# Patient Record
Sex: MALE | Race: WHITE
[De-identification: names, ages, dates, MRNs, and addresses within clinical notes are randomized per-mention and may not be internally consistent; named-entity substitution may affect disease eponyms.]

---

## 2017-04-11 ENCOUNTER — HOSPITAL ENCOUNTER (OUTPATIENT)
Dept: HOSPITAL 58 - LAB | Age: 52
Discharge: HOME | End: 2017-04-11
Attending: GENERAL PRACTICE

## 2017-04-11 DIAGNOSIS — R04.2: Primary | ICD-10-CM

## 2017-04-11 DIAGNOSIS — R05: ICD-10-CM

## 2017-04-11 LAB
ADD URINE MICROSCOPIC: NO
ALBUMIN SERPL-MCNC: 3.8 G/DL (ref 3.4–5)
ALBUMIN/GLOB SERPL: 1.03 {RATIO}
ALP SERPL-CCNC: 81 U/L (ref 50–136)
ALT SERPL-CCNC: 27 U/L (ref 12–78)
ANION GAP SERPL CALC-SCNC: 12.8 MMOL/L
AST SERPL-CCNC: 25 U/L (ref 15–37)
BASOPHILS # BLD AUTO: 0 K/UL (ref 0–0.2)
BASOPHILS NFR BLD AUTO: 0.1 % (ref 0–3)
BILIRUB SERPL-MCNC: 0.53 MG/DL (ref 0–1.2)
BUN SERPL-MCNC: 19 MG/DL (ref 7–18)
BUN/CREAT SERPL: 15.2
CALCIUM SERPL-MCNC: 9.5 MG/DL (ref 8.2–10.2)
CHLORIDE SERPL-SCNC: 105 MMOL/L (ref 98–107)
CHOLEST SERPL-MCNC: 165 MG/DL (ref 0–200)
CHOLEST/HDLC SERPL: 4.2 {RATIO} (ref 4.5–6.4)
CO2 BLD-SCNC: 25 MMOL/L (ref 21–32)
CREAT SERPL-MCNC: 1.25 MG/DL (ref 0.6–1.1)
EOSINOPHIL # BLD AUTO: 0.1 K/UL (ref 0–0.7)
EOSINOPHIL NFR BLD AUTO: 1.1 % (ref 0–7)
GFR SERPLBLD BASED ON 1.73 SQ M-ARVRAT: 60 ML/MIN
GLOBULIN SER CALC-MCNC: 3.7 G/L
GLUCOSE SERPL-MCNC: 90 MG/DL (ref 70–100)
HCT VFR BLD AUTO: 42.4 % (ref 42–52)
HDLC SERPL-MCNC: 39 MG/DL (ref 35–60)
HGB BLD-MCNC: 15.5 G/DL (ref 14–18)
IMM GRANULOCYTES NFR BLD AUTO: 0.4 % (ref 0–5)
LYMPHOCYTES # SPEC AUTO: 2.2 K/UL (ref 0.6–3.4)
LYMPHOCYTES NFR BLD AUTO: 22.6 % (ref 10–50)
MCH RBC QN: 31.5 PG (ref 27–31)
MCHC RBC AUTO-ENTMCNC: 36.6 G/DL (ref 31.8–35.4)
MCV RBC: 86.2 FL (ref 80–94)
MONOCYTES # BLD AUTO: 0.7 K/UL (ref 0.4–2)
MONOCYTES NFR BLD AUTO: 7.5 % (ref 0–10)
NEUTROPHILS # BLD AUTO: 6.7 K/UL (ref 2–6.9)
NEUTROPHILS NFR BLD AUTO: 68.3 %
PH UR: 5.5 [PH] (ref 5–9)
PLATELET # BLD AUTO: 207 10^3/UL (ref 140–440)
POTASSIUM SERPL-SCNC: 3.8 MMOL/L (ref 3.5–5.1)
PROT SERPL-MCNC: 7.5 G/DL (ref 6.4–8.2)
RBC # BLD AUTO: 4.92 10^6/UL (ref 4.7–6.1)
SODIUM SERPL-SCNC: 139 MMOL/L (ref 136–145)
SP GR UR: 1.02 (ref 1–1.03)
TRIGL SERPL-MCNC: 199 MG/DL (ref 30–150)
VLDLC SERPL CALC-MCNC: 40 MG/DL (ref 2–30)
WBC # BLD AUTO: 9.86 K/UL (ref 4.2–10.2)

## 2017-04-11 PROCEDURE — 85025 COMPLETE CBC W/AUTO DIFF WBC: CPT

## 2017-04-11 PROCEDURE — 80061 LIPID PANEL: CPT

## 2017-04-11 PROCEDURE — 81001 URINALYSIS AUTO W/SCOPE: CPT

## 2017-04-11 PROCEDURE — 36415 COLL VENOUS BLD VENIPUNCTURE: CPT

## 2017-04-11 PROCEDURE — 80053 COMPREHEN METABOLIC PANEL: CPT

## 2017-04-12 ENCOUNTER — HOSPITAL ENCOUNTER (OUTPATIENT)
Dept: HOSPITAL 58 - RAD | Age: 52
Discharge: HOME | End: 2017-04-12
Attending: GENERAL PRACTICE

## 2017-04-12 DIAGNOSIS — R04.2: Primary | ICD-10-CM

## 2017-04-12 NOTE — CT
EXAM:  CT THORAX 

  

HISTORY:  Hemoptysis. 

  

TECHNIQUE:  CT thorax with intravenous contrast.  5-mm axial sections. Coronal and sagittal re-forma
tions.  75 ml Omnipaque 

COMPARISON:  None 

  

FINDINGS: 

  

Heart size is upper limit normal. Calcifications of the heart.  There is mild aortic atherosclerotic
 disease.  A few nonspecific mediastinal and hilar lymph nodes are seen. 

  

Lungs are hyperinflated.  There is at least one calcified granuloma left lower lobe.  No suspicious 
pulmonary nodules or masses.  No acute infiltrates or vascular congestion.  There is no pneumothorax
 or pleural fluid. 

  

No acute bony abnormality. 

  

  

IMPRESSION: 

  

1.  Hyperinflation which can be consistent with a component chronic obstructive pulmonary disease, c
orrelate clinically.  No acute infiltrates or suspicious pulmonary nodules. 

2.  A few mildly prominent mediastinal lymph nodes may be reactive or postinflammatory in nature.  T
hese are nonspecific. 

3.  Given patient history, consider pulmonary consultation for further management planning.

## 2017-06-19 ENCOUNTER — RESULTS ENCOUNTER (OUTPATIENT)
Dept: UROLOGY | Facility: CLINIC | Age: 52
End: 2017-06-19

## 2017-06-19 DIAGNOSIS — R97.20 ELEVATED PSA: ICD-10-CM

## 2017-06-24 LAB
PSA FREE MFR SERPL: 16.1 %
PSA FREE SERPL-MCNC: 1.22 NG/ML
PSA SERPL-MCNC: 7.6 NG/ML (ref 0–4)

## 2017-06-26 ENCOUNTER — OFFICE VISIT (OUTPATIENT)
Dept: UROLOGY | Facility: CLINIC | Age: 52
End: 2017-06-26

## 2017-06-26 VITALS
WEIGHT: 248.8 LBS | BODY MASS INDEX: 29.38 KG/M2 | SYSTOLIC BLOOD PRESSURE: 130 MMHG | HEIGHT: 77 IN | TEMPERATURE: 98.1 F | DIASTOLIC BLOOD PRESSURE: 72 MMHG

## 2017-06-26 DIAGNOSIS — N40.1 BPH (BENIGN PROSTATIC HYPERTROPHY) WITH URINARY OBSTRUCTION: ICD-10-CM

## 2017-06-26 DIAGNOSIS — N52.9 IMPOTENCE OF ORGANIC ORIGIN: ICD-10-CM

## 2017-06-26 DIAGNOSIS — R97.20 ELEVATED PSA: Primary | ICD-10-CM

## 2017-06-26 DIAGNOSIS — N13.8 BPH (BENIGN PROSTATIC HYPERTROPHY) WITH URINARY OBSTRUCTION: ICD-10-CM

## 2017-06-26 LAB
BILIRUB BLD-MCNC: NEGATIVE MG/DL
CLARITY, POC: CLEAR
COLOR UR: YELLOW
GLUCOSE UR STRIP-MCNC: NEGATIVE MG/DL
KETONES UR QL: NEGATIVE
LEUKOCYTE EST, POC: NEGATIVE
NITRITE UR-MCNC: NEGATIVE MG/ML
PH UR: 5 [PH] (ref 5–8)
PROT UR STRIP-MCNC: NEGATIVE MG/DL
RBC # UR STRIP: NEGATIVE /UL
SP GR UR: 1.02 (ref 1–1.03)
UROBILINOGEN UR QL: NORMAL

## 2017-06-26 PROCEDURE — 81003 URINALYSIS AUTO W/O SCOPE: CPT | Performed by: UROLOGY

## 2017-06-26 PROCEDURE — 99213 OFFICE O/P EST LOW 20 MIN: CPT | Performed by: UROLOGY

## 2017-06-26 RX ORDER — ATORVASTATIN CALCIUM 20 MG/1
TABLET, FILM COATED ORAL
Refills: 3 | Status: ON HOLD | COMMUNITY
Start: 2017-05-06 | End: 2021-08-13

## 2017-06-26 NOTE — PROGRESS NOTES
Subjective    Mr. Piedra is 52 y.o. male    Chief Complaint: Elevated PSA    History of Present Illness     Benign Prostatic Hypertrophy  Patient complains of lower urinary tract symptoms. He reports frequency, incomplete emptying, intermittency, nocturia three times a night, urgency and weak stream. He denies straining. Patient states symptoms are of mild severity. Onset of symptoms was 12 months ago and was gradual in onset. His AUA Symptom Score is, 11/35.He reports a history of no complicating symptoms. He denies flank pain, gross hematuria, kidney stones and recurrent UTI. Patient has tried Alpha blockers with improvement. Last PSA was pending .           Elevated PSA  Patient is here with an elevated PSA. The PSA was drawn 12 month(s). He does have a family history of prostate cancer. His AUA Symptom Score is 11 /35. Voiding symptoms include Incomplete emptying, Frequency, Intermittency, Urgency, Weakened stream and Nocturia. Denies Straining, Dysuria and Hematuria. Voiding symptoms began several years . These have been gradual in onset. negative--7/16 PSA at time 6.3  Previous PSA values are :   No results found for: PSA        The following portions of the patient's history were reviewed and updated as appropriate: allergies, current medications, past family history, past medical history, past social history, past surgical history and problem list.    Review of Systems   Constitutional: Negative for chills and fever.   Gastrointestinal: Negative for abdominal pain, anal bleeding and blood in stool.   Genitourinary: Negative for flank pain and hematuria.         Current Outpatient Prescriptions:   •  atorvastatin (LIPITOR) 20 MG tablet, TAKE 1 TABLET BY MOUTH EVERY DAY, Disp: , Rfl: 3  •  Coenzyme Q10 (CO Q 10) 100 MG capsule, Take 300 mg by mouth., Disp: , Rfl:   •  metoprolol tartrate (LOPRESSOR) 25 MG tablet, TAKE 1 TABLET BY MOUTH TWICE A DAY, Disp: , Rfl: 3  •  tamsulosin (FLOMAX) 0.4 MG capsule 24 hr  "capsule, Take 1 capsule by mouth Daily., Disp: 90 capsule, Rfl: 3    Past Medical History:   Diagnosis Date   • Benign hypertension    • Cardiac dysrhythmia    • Chest pain    • Generalized anxiety disorder    • GERD (gastroesophageal reflux disease)    • Hyperlipidemia        Past Surgical History:   Procedure Laterality Date   • APPENDECTOMY         Social History     Social History   • Marital status:      Spouse name: N/A   • Number of children: N/A   • Years of education: N/A     Social History Main Topics   • Smoking status: Never Smoker   • Smokeless tobacco: None   • Alcohol use Yes      Comment: Occasional   • Drug use: None   • Sexual activity: Not Asked     Other Topics Concern   • None     Social History Narrative       Family History   Problem Relation Age of Onset   • Prostate cancer Father    • Coronary artery disease Mother    • Coronary artery disease Brother        Objective    /72  Temp 98.1 °F (36.7 °C)  Ht 77\" (195.6 cm)  Wt 248 lb 12.8 oz (113 kg)  BMI 29.5 kg/m2    Physical Exam   Constitutional: He is oriented to person, place, and time. He appears well-developed and well-nourished.   Pulmonary/Chest: Effort normal.   Abdominal: Soft. He exhibits no distension and no mass. There is no tenderness. There is no rebound and no guarding. No hernia.   Genitourinary: Penis normal. Rectal exam shows no mass, no tenderness and anal tone normal. Enlarged: for the age of the patient. Right testis shows no mass, no swelling and no tenderness. Left testis shows no mass, no swelling and no tenderness. No hypospadias. No discharge found.   Genitourinary Comments:  The urethral meatus normal in position without evidence of stricture. Epididymis without mass or tenderness. Vas Deferens is palpably normal.Anus and perineum without mass or tenderness. The prostate is approximately 50 ml. It is Symmetric, with a Soft consistency. There are no nodules present. . The seminal vesicles are Not " palpable due to the size of the prostate.     Neurological: He is alert and oriented to person, place, and time.           Results for orders placed or performed in visit on 06/26/17   POC Urinalysis Dipstick, Automated   Result Value Ref Range    Color Yellow Yellow, Straw, Dark Yellow, Lisa    Clarity, UA Clear Clear    Glucose, UA Negative Negative, 1000 mg/dL (3+) mg/dL    Bilirubin Negative Negative    Ketones, UA Negative Negative    Specific Gravity  1.025 1.005 - 1.030    Blood, UA Negative Negative    pH, Urine 5.0 5.0 - 8.0    Protein, POC Negative Negative mg/dL    Urobilinogen, UA Normal Normal    Leukocytes Negative Negative    Nitrite, UA Negative Negative     Assessment and Plan    Robert was seen today for elevated psa.    Diagnoses and all orders for this visit:    Elevated PSA  -     POC Urinalysis Dipstick, Automated  -     PSA, Total & Free; Future    BPH (benign prostatic hypertrophy) with urinary obstruction  -     PSA, Total & Free; Future    Impotence of organic origin          Patient underwent a biopsy in July 2016 for a PSA of 6.3.  He had a negative result.  There was no atypical glands or inflammation. GELY unchanged.  Follow up in 6 months. If PSA continues to rise will proceed with 4K score.

## 2017-07-10 ENCOUNTER — OFFICE VISIT (OUTPATIENT)
Dept: CARDIOLOGY | Facility: CLINIC | Age: 52
End: 2017-07-10

## 2017-07-10 VITALS
RESPIRATION RATE: 18 BRPM | HEART RATE: 76 BPM | HEIGHT: 77 IN | DIASTOLIC BLOOD PRESSURE: 80 MMHG | SYSTOLIC BLOOD PRESSURE: 130 MMHG | WEIGHT: 244 LBS | BODY MASS INDEX: 28.81 KG/M2

## 2017-07-10 DIAGNOSIS — E78.00 HYPERCHOLESTEROLEMIA: ICD-10-CM

## 2017-07-10 DIAGNOSIS — I49.3 PVC (PREMATURE VENTRICULAR CONTRACTION): Primary | ICD-10-CM

## 2017-07-10 DIAGNOSIS — R00.2 PALPITATIONS: ICD-10-CM

## 2017-07-10 DIAGNOSIS — I10 ESSENTIAL HYPERTENSION: ICD-10-CM

## 2017-07-10 PROCEDURE — 99214 OFFICE O/P EST MOD 30 MIN: CPT | Performed by: NURSE PRACTITIONER

## 2017-07-10 PROCEDURE — 93000 ELECTROCARDIOGRAM COMPLETE: CPT | Performed by: NURSE PRACTITIONER

## 2017-07-10 NOTE — PROGRESS NOTES
Subjective:     Encounter Date:07/10/2017      Patient ID: Robert Piedra is a 52 y.o. male.    Chief Complaint:  History of Present Illness  Patient is here for routine follow up. Has a history of palpitations found to be caused by benign PVC. Initially presented with chest pain that was found to be GERD and PVC. Patient has worn holter monitor and multiple stress test in past. Patient has occasional palpitations, brought on by stress and palpitations. Patient denies chest pain, shortness of breath or swelling. Patient is followed by Dr. Gupta. Blood pressure good control. Cholesterol good control.   The following portions of the patient's history were reviewed and updated as appropriate: allergies, current medications, past family history, past medical history, past social history, past surgical history and problem list.   Prior to Admission medications    Medication Sig Start Date End Date Taking? Authorizing Provider   atorvastatin (LIPITOR) 20 MG tablet TAKE 1 TABLET BY MOUTH EVERY DAY 5/6/17  Yes Historical Provider, MD   Coenzyme Q10 (CO Q 10) 100 MG capsule Take 300 mg by mouth.   Yes Historical Provider, MD   metoprolol tartrate (LOPRESSOR) 25 MG tablet TAKE 1 TABLET BY MOUTH TWICE A DAY 5/6/17  Yes Historical Provider, MD   tamsulosin (FLOMAX) 0.4 MG capsule 24 hr capsule Take 1 capsule by mouth Daily. 10/18/16  Yes Michele Cota MD     Past Medical History:   Diagnosis Date   • Benign hypertension    • Cardiac dysrhythmia    • Generalized anxiety disorder    • GERD (gastroesophageal reflux disease)    • Hyperlipidemia      Past Surgical History:   Procedure Laterality Date   • APPENDECTOMY         Review of Systems   Constitution: Negative for chills, decreased appetite, fever, malaise/fatigue, weight gain and weight loss.   HENT: Negative for headaches and nosebleeds.    Eyes: Negative for visual disturbance.   Cardiovascular: Positive for palpitations. Negative for chest pain, dyspnea on  "exertion, leg swelling, near-syncope, orthopnea (occasional), paroxysmal nocturnal dyspnea and syncope.   Respiratory: Negative for cough, hemoptysis, shortness of breath and snoring.    Endocrine: Negative for cold intolerance and heat intolerance.   Hematologic/Lymphatic: Negative for bleeding problem. Does not bruise/bleed easily.   Skin: Negative for rash.   Musculoskeletal: Negative for back pain and falls.   Gastrointestinal: Negative for abdominal pain, constipation, diarrhea, heartburn, melena, nausea and vomiting.   Genitourinary: Negative for hematuria.   Neurological: Negative for dizziness and light-headedness.   Psychiatric/Behavioral: Negative for altered mental status.   Allergic/Immunologic: Negative for persistent infections.         ECG 12 Lead  Date/Time: 7/10/2017 8:28 AM  Performed by: GANESH TRENT  Authorized by: GANESH TRENT   Comparison: compared with previous ECG from 8/31/2015  Similar to previous ECG  Rhythm: sinus rhythm  Ectopy: PVCs               Objective:     Physical Exam   Constitutional: He is oriented to person, place, and time. He appears well-developed and well-nourished.   HENT:   Head: Normocephalic and atraumatic.   Eyes: Pupils are equal, round, and reactive to light.   Neck: Normal range of motion. Neck supple. No JVD present. Carotid bruit is not present.   Cardiovascular: Normal rate, regular rhythm, normal heart sounds and intact distal pulses.    Pulmonary/Chest: Effort normal and breath sounds normal.   Abdominal: Soft. Bowel sounds are normal.   Musculoskeletal: Normal range of motion.   Neurological: He is alert and oriented to person, place, and time. He has normal reflexes.   Skin: Skin is warm and dry.   Psychiatric: He has a normal mood and affect. His behavior is normal. Judgment and thought content normal.     Blood pressure 130/80, pulse 76, resp. rate 18, height 77\" (195.6 cm), weight 244 lb (111 kg).      Lab Review:       Assessment:          " Diagnosis Plan   1. PVC (premature ventricular contraction)     2. Essential hypertension     3. Hypercholesterolemia     4. Palpitations            Plan:       1. Benign PVCs and palpitations. Controlled on beta blocker. No significant arrhythmia identified. No problems with operating as .   2. Blood Pressure controlled  3. Followed by PCP for cholesterol  4. Avoid triggers (caffeine, stress)

## 2017-10-29 DIAGNOSIS — N13.8 BENIGN PROSTATIC HYPERPLASIA WITH URINARY OBSTRUCTION: ICD-10-CM

## 2017-10-29 DIAGNOSIS — N40.1 BENIGN PROSTATIC HYPERPLASIA WITH URINARY OBSTRUCTION: ICD-10-CM

## 2017-10-30 RX ORDER — TAMSULOSIN HYDROCHLORIDE 0.4 MG/1
CAPSULE ORAL
Qty: 90 CAPSULE | Refills: 3 | Status: SHIPPED | OUTPATIENT
Start: 2017-10-30 | End: 2017-11-02 | Stop reason: SDUPTHER

## 2017-11-02 DIAGNOSIS — N13.8 BENIGN PROSTATIC HYPERPLASIA WITH URINARY OBSTRUCTION: ICD-10-CM

## 2017-11-02 DIAGNOSIS — N40.1 BENIGN PROSTATIC HYPERPLASIA WITH URINARY OBSTRUCTION: ICD-10-CM

## 2017-11-02 RX ORDER — TAMSULOSIN HYDROCHLORIDE 0.4 MG/1
CAPSULE ORAL
Qty: 90 CAPSULE | Refills: 0 | Status: SHIPPED | OUTPATIENT
Start: 2017-11-02 | End: 2018-06-13 | Stop reason: SDUPTHER

## 2017-12-07 DIAGNOSIS — N13.8 BENIGN PROSTATIC HYPERPLASIA WITH URINARY OBSTRUCTION: ICD-10-CM

## 2017-12-07 DIAGNOSIS — R97.20 ELEVATED PSA: ICD-10-CM

## 2017-12-07 DIAGNOSIS — N40.1 BENIGN PROSTATIC HYPERPLASIA WITH URINARY OBSTRUCTION: ICD-10-CM

## 2017-12-12 LAB
PSA FREE MFR SERPL: 21.1 %
PSA FREE SERPL-MCNC: 1.88 NG/ML
PSA SERPL-MCNC: 8.9 NG/ML (ref 0–4)

## 2017-12-13 ENCOUNTER — OFFICE VISIT (OUTPATIENT)
Dept: UROLOGY | Facility: CLINIC | Age: 52
End: 2017-12-13

## 2017-12-13 VITALS
TEMPERATURE: 97.5 F | DIASTOLIC BLOOD PRESSURE: 68 MMHG | WEIGHT: 245 LBS | HEIGHT: 77 IN | BODY MASS INDEX: 28.93 KG/M2 | SYSTOLIC BLOOD PRESSURE: 136 MMHG

## 2017-12-13 DIAGNOSIS — N40.1 BPH WITH URINARY OBSTRUCTION: ICD-10-CM

## 2017-12-13 DIAGNOSIS — N13.8 BPH WITH URINARY OBSTRUCTION: ICD-10-CM

## 2017-12-13 DIAGNOSIS — R97.20 ELEVATED PSA: Primary | ICD-10-CM

## 2017-12-13 PROCEDURE — 99213 OFFICE O/P EST LOW 20 MIN: CPT | Performed by: UROLOGY

## 2017-12-13 PROCEDURE — 81003 URINALYSIS AUTO W/O SCOPE: CPT | Performed by: UROLOGY

## 2017-12-13 NOTE — PROGRESS NOTES
Subjective    Mr. Piedra is 52 y.o. male    Chief Complaint: BPH    History of Present Illness     Benign Prostatic Hypertrophy  Patient complains of lower urinary tract symptoms. He reports frequency, incomplete emptying, intermittency, nocturia three times a night, urgency and weak stream. He denies straining. Patient states symptoms are of mild severity. Onset of symptoms was 24 months ago and was gradual in onset. His AUA Symptom Score is, 11/35.He reports a history of no complicating symptoms. He denies flank pain, gross hematuria, kidney stones and recurrent UTI. Patient has tried Alpha blockers with improvement. Last PSA was pending .              Elevated PSA  Patient is here with an elevated PSA. The PSA was drawn 12 month(s). He does have a family history of prostate cancer. His AUA Symptom Score is 11 /35. Voiding symptoms include Incomplete emptying, Frequency, Intermittency, Urgency, Weakened stream and Nocturia. Denies Straining, Dysuria and Hematuria. Voiding symptoms began several years . These have been gradual in onset. negative--7/16 PSA at time 6.3  Previous PSA values are : 8.9 % free 21.1  No results found for: PSA    The following portions of the patient's history were reviewed and updated as appropriate: allergies, current medications, past family history, past medical history, past social history, past surgical history and problem list.    Review of Systems   Constitutional: Negative for chills and fever.   Gastrointestinal: Negative for abdominal pain, anal bleeding and blood in stool.   Genitourinary: Negative for flank pain and hematuria.         Current Outpatient Prescriptions:   •  atorvastatin (LIPITOR) 20 MG tablet, TAKE 1 TABLET BY MOUTH EVERY DAY, Disp: , Rfl: 3  •  Coenzyme Q10 (CO Q 10) 100 MG capsule, Take 300 mg by mouth., Disp: , Rfl:   •  metoprolol tartrate (LOPRESSOR) 25 MG tablet, TAKE 1 TABLET BY MOUTH TWICE A DAY, Disp: , Rfl: 3  •  tamsulosin (FLOMAX) 0.4 MG capsule 24 hr  "capsule, TAKE 1 CAPSULE BY MOUTH DAILY, Disp: 90 capsule, Rfl: 0    Past Medical History:   Diagnosis Date   • Benign hypertension    • Cardiac dysrhythmia    • Generalized anxiety disorder    • GERD (gastroesophageal reflux disease)    • Hyperlipidemia        Past Surgical History:   Procedure Laterality Date   • APPENDECTOMY         Social History     Social History   • Marital status:      Spouse name: N/A   • Number of children: N/A   • Years of education: N/A     Social History Main Topics   • Smoking status: Former Smoker     Years: 20.00     Types: Cigarettes     Quit date: 2007   • Smokeless tobacco: Former User     Types: Chew   • Alcohol use Yes      Comment: Occasional   • Drug use: No   • Sexual activity: Defer     Other Topics Concern   • None     Social History Narrative       Family History   Problem Relation Age of Onset   • Prostate cancer Father    • Coronary artery disease Mother    • Coronary artery disease Brother    • No Known Problems Sister    • No Known Problems Sister    • No Known Problems Sister        Objective    /68  Temp 97.5 °F (36.4 °C)  Ht 195.6 cm (77\")  Wt 111 kg (245 lb)  BMI 29.05 kg/m2    Physical Exam   Constitutional: He is oriented to person, place, and time. He appears well-developed and well-nourished.   Pulmonary/Chest: Effort normal.   Abdominal: Soft. He exhibits no distension and no mass. There is no tenderness. There is no rebound and no guarding. No hernia.   Genitourinary: Penis normal. Rectal exam shows no mass, no tenderness and anal tone normal. Enlarged: for the age of the patient. Right testis shows no mass, no swelling and no tenderness. Left testis shows no mass, no swelling and no tenderness. No hypospadias. No discharge found.   Genitourinary Comments:  The urethral meatus normal in position without evidence of stricture. Epididymis without mass or tenderness. Vas Deferens is palpably normal.Anus and perineum without mass or tenderness. " The prostate is approximately 50 ml. It is Symmetric, with a Soft consistency. There are no nodules present. . The seminal vesicles are Not palpable due to the size of the prostate.     Neurological: He is alert and oriented to person, place, and time.           Results for orders placed or performed in visit on 12/13/17   POC Urinalysis Dipstick, Automated   Result Value Ref Range    Color Yellow Yellow, Straw, Dark Yellow, Lisa    Clarity, UA Clear Clear    Glucose, UA Negative Negative, 1000 mg/dL (3+) mg/dL    Bilirubin Negative Negative    Ketones, UA Negative Negative    Specific Gravity  1.025 1.005 - 1.030    Blood, UA Negative Negative    pH, Urine 5.0 5.0 - 8.0    Protein, POC Negative Negative mg/dL    Urobilinogen, UA Normal Normal    Leukocytes Negative Negative    Nitrite, UA Negative Negative     Assessment and Plan    Diagnoses and all orders for this visit:    Elevated PSA  -     POC Urinalysis Dipstick, Automated  -     PSA, Total & Free; Future    BPH with urinary obstruction          Patient had a negative biopsy for a PSA of 6.3.  His PSA now is 8.9 the percent free is 21%.  This Is an approximately a 10% chance of having prostate cancer on biopsy.  Continue follow up with 6 months.  If PSA > 10 will proceed with fusion MRI bx.

## 2017-12-18 ENCOUNTER — HOSPITAL ENCOUNTER (OUTPATIENT)
Dept: HOSPITAL 58 - LAB | Age: 52
Discharge: HOME | End: 2017-12-18
Attending: GENERAL PRACTICE

## 2017-12-18 DIAGNOSIS — E78.5: Primary | ICD-10-CM

## 2017-12-18 DIAGNOSIS — Z79.899: ICD-10-CM

## 2017-12-18 DIAGNOSIS — I10: ICD-10-CM

## 2017-12-18 LAB
ALBUMIN SERPL-MCNC: 3.6 G/DL (ref 3.4–5)
ALBUMIN/GLOB SERPL: 1.09 {RATIO}
ALP SERPL-CCNC: 69 U/L (ref 50–136)
ALT SERPL-CCNC: 24 U/L (ref 12–78)
ANION GAP SERPL CALC-SCNC: 14.9 MMOL/L
AST SERPL-CCNC: 30 U/L (ref 15–37)
BASOPHILS # BLD AUTO: 0 K/UL (ref 0–0.2)
BASOPHILS NFR BLD AUTO: 0.2 % (ref 0–3)
BILIRUB SERPL-MCNC: 0.8 MG/DL (ref 0–1.2)
BUN SERPL-MCNC: 15 MG/DL (ref 7–18)
BUN/CREAT SERPL: 17.64
CALCIUM SERPL-MCNC: 9.3 MG/DL (ref 8.2–10.2)
CHLORIDE SERPL-SCNC: 106 MMOL/L (ref 98–107)
CHOLEST SERPL-MCNC: 172 MG/DL (ref 0–200)
CHOLEST/HDLC SERPL: 3.3 {RATIO} (ref 4.5–6.4)
CO2 BLD-SCNC: 24 MMOL/L (ref 21–32)
CREAT SERPL-MCNC: 0.85 MG/DL (ref 0.6–1.1)
EOSINOPHIL # BLD AUTO: 0.2 K/UL (ref 0–0.7)
EOSINOPHIL NFR BLD AUTO: 3.5 % (ref 0–7)
GFR SERPLBLD BASED ON 1.73 SQ M-ARVRAT: 94 ML/MIN
GLOBULIN SER CALC-MCNC: 3.3 G/L
GLUCOSE SERPL-MCNC: 88 MG/DL (ref 70–100)
HCT VFR BLD AUTO: 42.7 % (ref 42–52)
HDLC SERPL-MCNC: 52 MG/DL (ref 35–60)
HGB BLD-MCNC: 14.7 G/DL (ref 14–18)
IMM GRANULOCYTES NFR BLD AUTO: 0.3 % (ref 0–5)
LYMPHOCYTES # SPEC AUTO: 2.2 K/UL (ref 0.6–3.4)
LYMPHOCYTES NFR BLD AUTO: 33.9 % (ref 10–50)
MCH RBC QN: 31.4 PG (ref 27–31)
MCHC RBC AUTO-ENTMCNC: 34.4 G/DL (ref 31.8–35.4)
MCV RBC: 91.2 FL (ref 80–94)
MONOCYTES # BLD AUTO: 0.6 K/UL (ref 0.4–2)
MONOCYTES NFR BLD AUTO: 9.6 % (ref 0–10)
NEUTROPHILS # BLD AUTO: 3.4 K/UL (ref 2–6.9)
NEUTROPHILS NFR BLD AUTO: 52.5 %
PLATELET # BLD AUTO: 177 10^3/UL (ref 140–440)
POTASSIUM SERPL-SCNC: 3.9 MMOL/L (ref 3.5–5.1)
PROT SERPL-MCNC: 6.9 G/DL (ref 6.4–8.2)
RBC # BLD AUTO: 4.68 10^6/UL (ref 4.7–6.1)
SODIUM SERPL-SCNC: 141 MMOL/L (ref 136–145)
TRIGL SERPL-MCNC: 56 MG/DL (ref 30–150)
VLDLC SERPL CALC-MCNC: 11 MG/DL (ref 2–30)
WBC # BLD AUTO: 6.55 K/UL (ref 4.2–10.2)

## 2017-12-18 PROCEDURE — 80053 COMPREHEN METABOLIC PANEL: CPT

## 2017-12-18 PROCEDURE — 85025 COMPLETE CBC W/AUTO DIFF WBC: CPT

## 2017-12-18 PROCEDURE — 36415 COLL VENOUS BLD VENIPUNCTURE: CPT

## 2017-12-18 PROCEDURE — 80061 LIPID PANEL: CPT

## 2018-06-04 DIAGNOSIS — R97.20 ELEVATED PSA: Primary | ICD-10-CM

## 2018-06-04 LAB — PSA SERPL-MCNC: 8.46 NG/ML (ref 0–4)

## 2018-06-11 ENCOUNTER — RESULTS ENCOUNTER (OUTPATIENT)
Dept: UROLOGY | Facility: CLINIC | Age: 53
End: 2018-06-11

## 2018-06-11 DIAGNOSIS — R97.20 ELEVATED PSA: ICD-10-CM

## 2018-06-12 NOTE — PROGRESS NOTES
Subjective    Mr. Piedra is 53 y.o. male    Chief Complaint: BPH    History of Present Illness    Benign Prostatic Hypertrophy  Patient complains of lower urinary tract symptoms. He reports frequency, incomplete emptying, intermittency, nocturia three times a night, urgency and weak stream. He denies straining. Patient states symptoms are of mild severity. Onset of symptoms was  several years ago and was gradual in onset. His AUA Symptom Score is, 18/35.He reports a history of no complicating symptoms. He denies flank pain, gross hematuria, kidney stones and recurrent UTI. Patient has tried Alpha blockers with improvement. Last PSA was pending .          Elevated PSA  Patient is here with an elevated PSA. The PSA was drawn 12 month(s). He does have a family history of prostate cancer. His AUA Symptom Score is 18 /35. Voiding symptoms include Incomplete emptying, Frequency, Intermittency, Urgency, Weakened stream and Nocturia. Denies Straining, Dysuria and Hematuria. Voiding symptoms began several years . These have been gradual in onset. negative--7/16 PSA at time 6.3  Previous PSA values are : 8.9 % free 21.1  Most recent PSA on 06/04/2018 is 8.460.    The following portions of the patient's history were reviewed and updated as appropriate: allergies, current medications, past family history, past medical history, past social history, past surgical history and problem list.    Review of Systems   Constitutional: Negative for chills and fever.   Gastrointestinal: Negative for abdominal pain, anal bleeding and blood in stool.   Genitourinary: Negative for flank pain, frequency, hematuria and urgency.         Current Outpatient Prescriptions:   •  atorvastatin (LIPITOR) 20 MG tablet, TAKE 1 TABLET BY MOUTH EVERY DAY, Disp: , Rfl: 3  •  Coenzyme Q10 (CO Q 10) 100 MG capsule, Take 300 mg by mouth., Disp: , Rfl:   •  metoprolol tartrate (LOPRESSOR) 25 MG tablet, TAKE 1 TABLET BY MOUTH TWICE A DAY, Disp: , Rfl: 3  •   "tamsulosin (FLOMAX) 0.4 MG capsule 24 hr capsule, TAKE 1 CAPSULE BY MOUTH DAILY, Disp: 90 capsule, Rfl: 3    Past Medical History:   Diagnosis Date   • Benign hypertension    • Cardiac dysrhythmia    • Generalized anxiety disorder    • GERD (gastroesophageal reflux disease)    • Hyperlipidemia        Past Surgical History:   Procedure Laterality Date   • APPENDECTOMY         Social History     Social History   • Marital status:      Social History Main Topics   • Smoking status: Former Smoker     Years: 20.00     Types: Cigarettes     Quit date: 2007   • Smokeless tobacco: Former User     Types: Chew   • Alcohol use Yes      Comment: Occasional   • Drug use: No   • Sexual activity: Defer     Other Topics Concern   • Not on file       Family History   Problem Relation Age of Onset   • Prostate cancer Father    • Coronary artery disease Mother    • Coronary artery disease Brother    • No Known Problems Sister    • No Known Problems Sister    • No Known Problems Sister        Objective    /80   Temp 98.5 °F (36.9 °C)   Ht 195.6 cm (77\")   Wt 115 kg (254 lb 6.4 oz)   BMI 30.17 kg/m²     Physical Exam   Constitutional: He is oriented to person, place, and time. He appears well-developed and well-nourished.   Pulmonary/Chest: Effort normal.   Abdominal: Soft. He exhibits no distension and no mass. There is no tenderness. There is no rebound and no guarding. No hernia.   Genitourinary: Penis normal. Rectal exam shows no mass, no tenderness and anal tone normal. Enlarged: for the age of the patient. Right testis shows no mass, no swelling and no tenderness. Left testis shows no mass, no swelling and no tenderness. No hypospadias. No discharge found.   Genitourinary Comments:  The urethral meatus normal in position without evidence of stricture. Epididymis without mass or tenderness. Vas Deferens is palpably normal.Anus and perineum without mass or tenderness. The prostate is approximately 50 ml. It is " Symmetric, with a Soft consistency. There are no nodules present. . The seminal vesicles are Not palpable due to the size of the prostate.     Neurological: He is alert and oriented to person, place, and time.           Results for orders placed or performed in visit on 06/13/18   POC Urinalysis Dipstick, Multipro   Result Value Ref Range    Color Yellow Yellow, Straw, Dark Yellow, Lisa    Clarity, UA Clear Clear    Glucose, UA Negative Negative, 1000 mg/dL (3+) mg/dL    Bilirubin Negative Negative    Ketones, UA Negative Negative    Specific Gravity  1.025 1.005 - 1.030    Blood, UA Negative Negative    pH, Urine 5.5 5.0 - 8.0    Protein, POC Negative Negative mg/dL    Urobilinogen, UA Normal Normal    Nitrite, UA Negative Negative    Leukocytes Trace (A) Negative     Assessment and Plan    Robert was seen today for elevated psa.    Diagnoses and all orders for this visit:    Elevated PSA  -     POC Urinalysis Dipstick, Multipro  -     PSA, Total & Free; Future    Benign prostatic hyperplasia with urinary obstruction  -     tamsulosin (FLOMAX) 0.4 MG capsule 24 hr capsule; TAKE 1 CAPSULE BY MOUTH DAILY          Patient had a negative biopsy for a PSA of 6.3.  His PSA now is 8.4, Which is decreased from 8.9 the percent free is 21%.   Unfortunately a percent free was not drawn. Continue follow up in 6 months with PSA and % free.

## 2018-06-13 ENCOUNTER — OFFICE VISIT (OUTPATIENT)
Dept: UROLOGY | Facility: CLINIC | Age: 53
End: 2018-06-13

## 2018-06-13 VITALS
DIASTOLIC BLOOD PRESSURE: 80 MMHG | HEIGHT: 77 IN | SYSTOLIC BLOOD PRESSURE: 152 MMHG | BODY MASS INDEX: 30.04 KG/M2 | TEMPERATURE: 98.5 F | WEIGHT: 254.4 LBS

## 2018-06-13 DIAGNOSIS — N40.1 BENIGN PROSTATIC HYPERPLASIA WITH URINARY OBSTRUCTION: ICD-10-CM

## 2018-06-13 DIAGNOSIS — N13.8 BENIGN PROSTATIC HYPERPLASIA WITH URINARY OBSTRUCTION: ICD-10-CM

## 2018-06-13 DIAGNOSIS — R97.20 ELEVATED PSA: Primary | ICD-10-CM

## 2018-06-13 LAB
BILIRUB BLD-MCNC: NEGATIVE MG/DL
CLARITY, POC: CLEAR
COLOR UR: YELLOW
GLUCOSE UR STRIP-MCNC: NEGATIVE MG/DL
KETONES UR QL: NEGATIVE
LEUKOCYTE EST, POC: ABNORMAL
NITRITE UR-MCNC: NEGATIVE MG/ML
PH UR: 5.5 [PH] (ref 5–8)
PROT UR STRIP-MCNC: NEGATIVE MG/DL
RBC # UR STRIP: NEGATIVE /UL
SP GR UR: 1.02 (ref 1–1.03)
UROBILINOGEN UR QL: NORMAL

## 2018-06-13 PROCEDURE — 99213 OFFICE O/P EST LOW 20 MIN: CPT | Performed by: UROLOGY

## 2018-06-13 PROCEDURE — 81001 URINALYSIS AUTO W/SCOPE: CPT | Performed by: UROLOGY

## 2018-06-13 RX ORDER — TAMSULOSIN HYDROCHLORIDE 0.4 MG/1
CAPSULE ORAL
Qty: 90 CAPSULE | Refills: 3 | Status: SHIPPED | OUTPATIENT
Start: 2018-06-13 | End: 2019-09-11 | Stop reason: SDUPTHER

## 2018-06-13 NOTE — PATIENT INSTRUCTIONS

## 2018-10-22 ENCOUNTER — HOSPITAL ENCOUNTER (OUTPATIENT)
Dept: HOSPITAL 58 - LAB | Age: 53
Discharge: HOME | End: 2018-10-22
Attending: GENERAL PRACTICE

## 2018-10-22 DIAGNOSIS — R97.20: Primary | ICD-10-CM

## 2018-10-22 DIAGNOSIS — R73.03: ICD-10-CM

## 2018-10-22 PROCEDURE — 85025 COMPLETE CBC W/AUTO DIFF WBC: CPT

## 2018-10-22 PROCEDURE — 81001 URINALYSIS AUTO W/SCOPE: CPT

## 2018-10-22 PROCEDURE — 80061 LIPID PANEL: CPT

## 2018-10-22 PROCEDURE — 80053 COMPREHEN METABOLIC PANEL: CPT

## 2018-10-22 PROCEDURE — 83036 HEMOGLOBIN GLYCOSYLATED A1C: CPT

## 2018-10-22 PROCEDURE — 36415 COLL VENOUS BLD VENIPUNCTURE: CPT

## 2018-12-06 ENCOUNTER — TELEPHONE (OUTPATIENT)
Dept: UROLOGY | Facility: CLINIC | Age: 53
End: 2018-12-06

## 2018-12-06 NOTE — TELEPHONE ENCOUNTER
Called and left a message for patient to call back and reschedule his missed lab appointment. Will mail patient a letter for them to reschedule.

## 2018-12-08 LAB
PSA FREE MFR SERPL: 26.1 %
PSA FREE SERPL-MCNC: 1.15 NG/ML
PSA SERPL-MCNC: 4.4 NG/ML (ref 0–4)

## 2018-12-10 ENCOUNTER — RESULTS ENCOUNTER (OUTPATIENT)
Dept: UROLOGY | Facility: CLINIC | Age: 53
End: 2018-12-10

## 2018-12-10 DIAGNOSIS — R97.20 ELEVATED PSA: ICD-10-CM

## 2018-12-11 NOTE — PROGRESS NOTES
Subjective    Mr. Piedra is 53 y.o. male    Chief Complaint: BPH    History of Present Illness     Benign Prostatic Hypertrophy  Patient complains of lower urinary tract symptoms. He reports frequency, incomplete emptying, intermittency, nocturia three times a night, urgency and weak stream. He denies straining. Patient states symptoms are of mild severity. Onset of symptoms was  several years ago and was gradual in onset. His AUA Symptom Score is, 18/35.He reports a history of no complicating symptoms. He denies flank pain, gross hematuria, kidney stones and recurrent UTI. Patient has tried Alpha blockers with improvement. Last PSA was pending .          Elevated PSA  Patient is here with an elevated PSA. The PSA was drawn 1 week(s). He does have a family history of prostate cancer. His AUA Symptom Score is 18 /35. Voiding symptoms include Incomplete emptying, Frequency, Intermittency, Urgency, Weakened stream and Nocturia. Denies Straining, Dysuria and Hematuria. Voiding symptoms began several years . These have been gradual in onset. Negative    Lab Results   Component Value Date    PSA 4.4 (H) 12/07/2018    PSA 8.460 (H) 06/04/2018    PSA 8.9 (H) 12/11/2017         The following portions of the patient's history were reviewed and updated as appropriate: allergies, current medications, past family history, past medical history, past social history, past surgical history and problem list.    Review of Systems   Constitutional: Negative for chills and fever.   Gastrointestinal: Negative for abdominal pain, anal bleeding and blood in stool.   Genitourinary: Positive for difficulty urinating. Negative for decreased urine volume, discharge, dysuria, enuresis, flank pain, frequency, genital sores, hematuria, penile pain, penile swelling, scrotal swelling, testicular pain and urgency.         Current Outpatient Medications:   •  atorvastatin (LIPITOR) 20 MG tablet, TAKE 1 TABLET BY MOUTH EVERY DAY, Disp: , Rfl: 3  •   "Coenzyme Q10 (CO Q 10) 100 MG capsule, Take 300 mg by mouth., Disp: , Rfl:   •  metoprolol tartrate (LOPRESSOR) 25 MG tablet, TAKE 1 TABLET BY MOUTH TWICE A DAY, Disp: , Rfl: 3  •  tamsulosin (FLOMAX) 0.4 MG capsule 24 hr capsule, TAKE 1 CAPSULE BY MOUTH DAILY, Disp: 90 capsule, Rfl: 3    Past Medical History:   Diagnosis Date   • Benign hypertension    • Cardiac dysrhythmia    • Generalized anxiety disorder    • GERD (gastroesophageal reflux disease)    • Hyperlipidemia        Past Surgical History:   Procedure Laterality Date   • APPENDECTOMY         Social History     Socioeconomic History   • Marital status:      Spouse name: Not on file   • Number of children: Not on file   • Years of education: Not on file   • Highest education level: Not on file   Tobacco Use   • Smoking status: Former Smoker     Years: 20.00     Types: Cigarettes     Last attempt to quit:      Years since quittin.9   • Smokeless tobacco: Former User     Types: Chew   Substance and Sexual Activity   • Alcohol use: Yes     Comment: Occasional   • Drug use: No   • Sexual activity: Defer       Family History   Problem Relation Age of Onset   • Prostate cancer Father    • Coronary artery disease Mother    • Coronary artery disease Brother    • No Known Problems Sister    • No Known Problems Sister    • No Known Problems Sister        Objective    Temp 98.5 °F (36.9 °C)   Ht 195.6 cm (77\")   Wt 115 kg (253 lb 9.6 oz)   BMI 30.07 kg/m²     Physical Exam   Constitutional: He is oriented to person, place, and time. He appears well-developed and well-nourished.   Pulmonary/Chest: Effort normal.   Abdominal: Soft. He exhibits no distension and no mass. There is no tenderness. There is no rebound and no guarding. No hernia.   Genitourinary: Penis normal. Rectal exam shows no mass, no tenderness and anal tone normal. Enlarged: for the age of the patient. Right testis shows no mass, no swelling and no tenderness. Left testis shows no " mass, no swelling and no tenderness. No hypospadias. No discharge found.   Genitourinary Comments:  The urethral meatus normal in position without evidence of stricture. Epididymis without mass or tenderness. Vas Deferens is palpably normal.Anus and perineum without mass or tenderness. The prostate is approximately 50 ml. It is Symmetric, with a Soft consistency. There are no nodules present. . The seminal vesicles are Not palpable due to the size of the prostate.     Neurological: He is alert and oriented to person, place, and time.           Results for orders placed or performed in visit on 12/13/18   POC Urinalysis Dipstick, Multipro   Result Value Ref Range    Color Yellow Yellow, Straw, Dark Yellow, Lisa    Clarity, UA Clear Clear    Glucose, UA Negative Negative, 1000 mg/dL (3+) mg/dL    Bilirubin Negative Negative    Ketones, UA Negative Negative    Specific Gravity  1.025 1.005 - 1.030    Blood, UA Negative Negative    pH, Urine 6.0 5.0 - 8.0    Protein, POC Negative Negative mg/dL    Urobilinogen, UA Normal Normal    Nitrite, UA Negative Negative    Leukocytes Negative Negative   Patient's Body mass index is 30.07 kg/m². BMI is above normal parameters. Recommendations include: educational material.    Assessment and Plan    Diagnoses and all orders for this visit:    Elevated PSA  -     POC Urinalysis Dipstick, Multipro  -     PSA, Total & Free; Future    BPH with urinary obstruction    Impotence of organic origin    Patient had a negative biopsy for a PSA of 6.3 7/2016.    PSA today is 4.4 with a percent free of 26%.    He is maintained on Flomax for his voiding symptoms.  He has an AUA symptom score of 16/35 with a bother score 3.    Continue follow up every six months with repeat PSA testing.

## 2018-12-13 ENCOUNTER — OFFICE VISIT (OUTPATIENT)
Dept: UROLOGY | Facility: CLINIC | Age: 53
End: 2018-12-13

## 2018-12-13 VITALS — BODY MASS INDEX: 29.94 KG/M2 | TEMPERATURE: 98.5 F | HEIGHT: 77 IN | WEIGHT: 253.6 LBS

## 2018-12-13 DIAGNOSIS — N52.9 IMPOTENCE OF ORGANIC ORIGIN: ICD-10-CM

## 2018-12-13 DIAGNOSIS — R97.20 ELEVATED PSA: Primary | ICD-10-CM

## 2018-12-13 DIAGNOSIS — N13.8 BPH WITH URINARY OBSTRUCTION: ICD-10-CM

## 2018-12-13 DIAGNOSIS — N40.1 BPH WITH URINARY OBSTRUCTION: ICD-10-CM

## 2018-12-13 LAB
BILIRUB BLD-MCNC: NEGATIVE MG/DL
CLARITY, POC: CLEAR
COLOR UR: YELLOW
GLUCOSE UR STRIP-MCNC: NEGATIVE MG/DL
KETONES UR QL: NEGATIVE
LEUKOCYTE EST, POC: NEGATIVE
NITRITE UR-MCNC: NEGATIVE MG/ML
PH UR: 6 [PH] (ref 5–8)
PROT UR STRIP-MCNC: NEGATIVE MG/DL
RBC # UR STRIP: NEGATIVE /UL
SP GR UR: 1.02 (ref 1–1.03)
UROBILINOGEN UR QL: NORMAL

## 2018-12-13 PROCEDURE — 99213 OFFICE O/P EST LOW 20 MIN: CPT | Performed by: UROLOGY

## 2018-12-13 PROCEDURE — 81001 URINALYSIS AUTO W/SCOPE: CPT | Performed by: UROLOGY

## 2018-12-13 NOTE — PATIENT INSTRUCTIONS

## 2019-03-11 ENCOUNTER — HOSPITAL ENCOUNTER (EMERGENCY)
Dept: HOSPITAL 58 - ED | Age: 54
Discharge: HOME | End: 2019-03-11

## 2019-03-11 VITALS — BODY MASS INDEX: 30.5 KG/M2

## 2019-03-11 VITALS — SYSTOLIC BLOOD PRESSURE: 148 MMHG | TEMPERATURE: 97.6 F | DIASTOLIC BLOOD PRESSURE: 100 MMHG

## 2019-03-11 DIAGNOSIS — R05: ICD-10-CM

## 2019-03-11 DIAGNOSIS — T78.40XA: Primary | ICD-10-CM

## 2019-03-11 DIAGNOSIS — R21: ICD-10-CM

## 2019-03-11 DIAGNOSIS — R06.02: ICD-10-CM

## 2019-03-11 PROCEDURE — 99282 EMERGENCY DEPT VISIT SF MDM: CPT

## 2019-03-11 NOTE — ED.PDOC
General


ED Provider: 


Dr. LAN QUINTANILLA





Chief Complaint: Allergic Reaction


Stated Complaint: developed a rash after being treated with the antibiotics he 

has been having a cough, no c/p no shortness of breath able to do his routine 

without gettting s.o.b.


Time Seen by Physician: 08:10


Mode of Arrival: Walk-In


Information Source: Patient


Exam Limitations: No limitations


[facility] 


[facility]





Nursing and Triage Documentation Reviewed and Agree: Yes


Does patient meet sepsis criteria?: No


System Inflammatory Response Syndrome: Not Applicable


Sepsis Protocol: 


For patient's 13 years and over:





Temp is 96.8 and below  and greater


Pulse >90 BPM


Resp >20/minute


Acutely Altered Mental Status





Are patient's symptoms suggestive of a new infection, such as:


   -Pneumonia


   -Skin, Soft Tissue


   -Endocarditis


   -UTI


   -Bone, Joint Infection


   -Implantable Device


   -Acute Abdominal Infection


   -Wound Infection


   -Meningitis


   -Blood Stream Catheter Infection


   -Unknown








Respiratory Complaint Exam





- Respiratory Complaint/Exam


Symptoms Are: Still present


Timing: Intermittent


Initial Severity: Mild


Current Severity: Mild


Location: Nose, Throat, Chest


Character: Reports: Non-productive cough, Dry cough


Aggravating: Reports: None


Alleviating: Reports: Spontaneous resolution


Associated Signs and Symptoms: Denies: Rapid breathing, Dyspnea, Fever, Chills, 

Chest pain, Pleuritic chest pain, Wheezing, Hemoptysis, Dizziness, Calf pain, 

Calf swelling, Edema, URI, Nasal congestion, Hoarseness, Sinus discomfort, 

Vomiting, Sore throat, Weight loss, Decreased oral intake, Increased thirst, 

Increased appetite, Increased urination


History of Healthcare-Acquired Pneumonia: No


Related Surgical History: Reports: None


Pulmonary Embolism Risk Factors: None


Cardiac Risk Factors: Reports: None


Pseudomonas Risk Factors: Reports: None


Tuberculosis Risk Factors: Reports: None


Status Asthmaticus Risk Factors: Reports: None


Home Oxygen Use: No


Recent Stress Test: No


Recent Echo/LV Function: No


Current Antibiotic Use: No


Current Asthma Medication Use: No


Respiratory Distress: None


Inadequate Respiratory Effort: No


Dysphagia Present: No


Stridor Present: No


JVD Present: No


Accessory Muscle Use: No


Retractions: Not Present


Diminished Breath Sounds: No


Sinus Tenderness: None


Grunting Respirations: No


Kussmaul Respirations: No


Differential Diagnoses: Pneumonia, Bronchitis





Review of Systems





- Review Of Systems


Constitutional: Reports: No symptoms


Eyes: Reports: No symptoms


Ears, Nose, Mouth, Throat: Reports: No symptoms


Respiratory: Reports: Cough


Cardiac: Reports: No symptoms


GI: Reports: No symptoms


: Reports: No symptoms


Musculoskeletal: Reports: No symptoms


Skin: Reports: Rash (CHEST ABDOMEN WHICH IS PURITIC)


Neurological: Reports: No symptoms


Endocrine: Reports: No symptoms


Hematologic/Lymphatic: Reports: No symptoms


All Other Systems: Reviewed and Negative





Past Medical History





- Past Medical History


Previously Healthy: Yes


Endocrine: Reports: None


Cardiovascular: Reports: None


Respiratory: Reports: None


Hematological: Reports: None


Gastrointestinal: Reports: None


Genitourinary: Reports: None


Neuro/Psych: Reports: None


Musculoskeletal: Reports: None


Cancer: Reports: None





- Surgical History


General Surgical History: Reports: None





- Family History


Family History: Reports: None





- Social History


Smoking Status: Former smoker


Hx Substance Use: No


Alcohol Screening: None





Physical Exam





- Physical Exam


Appearance: Well-appearing, No pain distress, Well-nourished


Eyes: MARS, EOMI, Conjunctiva clear


ENT: Ears normal, Nose normal, Oropharynx normal


Neck: Supple (NO AIR WAY ISSUE NOTED)


Respiratory: Airway patent, Breath sounds clear, Breath sounds equal, 

Respirations nonlabored


Cardiovascular: RRR, Pulses normal, No rub, No murmur


GI/: Soft, Nontender, No masses, Bowel sounds normal, No Organomegaly


Musculoskeletal: Normal strength, ROM intact, No edema, No calf tenderness


Skin: Warm, Dry (A MACULAR RASH NOTED ON THE CHEST ABDOMEN), Normal color


Neurological: Sensation intact, Motor intact, Reflexes intact, Cranial nerves 

intact, Alert, Oriented


Psychiatric: Affect appropriate, Mood appropriate





Critical Care Note





- Critical Care Note


Total Time (mins): 0





Course





- Course


Orders, Labs, Meds: 





Orders











 Category Date Time Status


 


 CT CHEST W/O CONTRAST Stat RADS  03/11/19 09:01 Taken











Vital Signs: 





 











  Temp Pulse Resp BP Pulse Ox


 


 03/11/19 08:03  97.6 F  74  20  148/100 H  95














Departure





- Departure


Time of Disposition: 10:30


Disposition: HOME SELF-CARE


Discharge Problem: 


 Cough, Rash





Allergic reaction


Qualifiers:


 Encounter type: initial encounter Qualified Code(s): T78.40XA - Allergy, 

unspecified, initial encounter





Instructions:  Antibiotic Medication Allergy (ED), General Allergic Reaction (ED

)


Condition: Good


Pt referred to PMD for follow-up: Yes


IPMP verified?: No


Additional Instructions: 


Please call your Family Physician as soon as possible to schedule a follow-up 

appointment.STOP  ZITHROMAX(Z.PACK) . SEE YOUR  M.D. FOR  THE FOLLOW  UP.   IF 

YOU HAVE ANY ISSUES LIKE,  SHORTNESS OF BREATH OR CHEST PAIN OR BOTH RETURN TO 

E.R..


Allergies/Adverse Reactions: 


Allergies





Penicillins Allergy (Severe, Verified 03/11/19 08:09)


 Rash


 From age 16 








Home Medications: 


Ambulatory Orders





Ubidecarenone [Co Q-10] 300 mg PO DAILY 10/13/15 


Tamsulosin HCl [Flomax] 0.4 mg PO DAILY  tab-cap 12/09/16 


Albuterol Sulfate [Proair Hfa] 2 puff IH Q4H PRN 03/11/19 


Azithromycin 250 mg PO DAILY 03/11/19 


Loratadine [Claritin] 10 mg PO DAILY 03/11/19 


Methylprednisolone [Medrol Dosepak] 4 mg PO AS DIRECTED 03/11/19

## 2019-03-11 NOTE — CT
EXAM:  CT of the chest without contrast 

  

History:  Cough. 

  

Comparison:  Chest CT 04/12/2017 

  

Technique:  Multiplanar CT images through the thorax were obtained without the administration of IV c
ontrast 

  

Findings:  Heart is enlarged.  Coronary calcifications and mitral valve calcifications.  No pericardi
al effusion.  No thoracic aortic aneurysm.  Small scattered mediastinal lymph nodes.  Evaluation for 
hilar lymph nodes is limited due to lack of IV contrast.  There is interlobular septal thickening whi
ch is more prominent in the right lung.  Patchy ground-glass infiltrates are seen within the right ole
ng.  No suspicious lung masses or lung nodules.  Calcified granulomas are seen within the lungs.  No 
pleural fluid and no pneumothorax. 

  

Within the visualized upper abdomen, no acute findings.  No acute osseous abnormalities. 

  

Impression: 

1.  Interlobular septal thickening which is more prominent in the right lung with patchy ground-glass
 right lung infiltrates could be due to asymmetric pulmonary edema or interstitial pneumonitis. 

2.  Cardiomegaly. 

3.  Coronary artery disease and mitral valve calcifications

## 2019-06-11 ENCOUNTER — RESULTS ENCOUNTER (OUTPATIENT)
Dept: UROLOGY | Facility: CLINIC | Age: 54
End: 2019-06-11

## 2019-06-11 DIAGNOSIS — R97.20 ELEVATED PSA: ICD-10-CM

## 2019-06-14 LAB
PSA FREE MFR SERPL: 22.1 %
PSA FREE SERPL-MCNC: 1.26 NG/ML
PSA SERPL-MCNC: 5.7 NG/ML (ref 0–4)

## 2019-09-06 ENCOUNTER — OFFICE VISIT (OUTPATIENT)
Dept: UROLOGY | Facility: CLINIC | Age: 54
End: 2019-09-06

## 2019-09-06 VITALS — BODY MASS INDEX: 29.87 KG/M2 | HEART RATE: 83 BPM | OXYGEN SATURATION: 97 % | WEIGHT: 253 LBS | HEIGHT: 77 IN

## 2019-09-06 DIAGNOSIS — R97.20 ELEVATED PSA: Primary | ICD-10-CM

## 2019-09-06 DIAGNOSIS — N52.9 IMPOTENCE OF ORGANIC ORIGIN: ICD-10-CM

## 2019-09-06 DIAGNOSIS — N13.8 BPH WITH URINARY OBSTRUCTION: ICD-10-CM

## 2019-09-06 DIAGNOSIS — N40.1 BPH WITH URINARY OBSTRUCTION: ICD-10-CM

## 2019-09-06 LAB
BILIRUB BLD-MCNC: NEGATIVE MG/DL
CLARITY, POC: CLEAR
COLOR UR: YELLOW
GLUCOSE UR STRIP-MCNC: NEGATIVE MG/DL
KETONES UR QL: ABNORMAL
LEUKOCYTE EST, POC: NEGATIVE
NITRITE UR-MCNC: NEGATIVE MG/ML
PH UR: 5 [PH] (ref 5–8)
PROT UR STRIP-MCNC: NEGATIVE MG/DL
RBC # UR STRIP: NEGATIVE /UL
SP GR UR: 1.02 (ref 1–1.03)
UROBILINOGEN UR QL: NORMAL

## 2019-09-06 PROCEDURE — 99213 OFFICE O/P EST LOW 20 MIN: CPT | Performed by: UROLOGY

## 2019-09-06 PROCEDURE — 81003 URINALYSIS AUTO W/O SCOPE: CPT | Performed by: UROLOGY

## 2019-09-06 RX ORDER — APIXABAN 5 MG/1
5 TABLET, FILM COATED ORAL 2 TIMES DAILY
Refills: 4 | COMMUNITY
Start: 2019-08-20 | End: 2020-09-10

## 2019-09-06 NOTE — PATIENT INSTRUCTIONS

## 2019-09-11 DIAGNOSIS — N40.1 BENIGN PROSTATIC HYPERPLASIA WITH URINARY OBSTRUCTION: ICD-10-CM

## 2019-09-11 DIAGNOSIS — N13.8 BENIGN PROSTATIC HYPERPLASIA WITH URINARY OBSTRUCTION: ICD-10-CM

## 2019-09-11 RX ORDER — TAMSULOSIN HYDROCHLORIDE 0.4 MG/1
CAPSULE ORAL
Qty: 90 CAPSULE | Refills: 3 | Status: SHIPPED | OUTPATIENT
Start: 2019-09-11 | End: 2020-08-31

## 2020-08-29 DIAGNOSIS — N13.8 BENIGN PROSTATIC HYPERPLASIA WITH URINARY OBSTRUCTION: ICD-10-CM

## 2020-08-29 DIAGNOSIS — N40.1 BENIGN PROSTATIC HYPERPLASIA WITH URINARY OBSTRUCTION: ICD-10-CM

## 2020-08-31 RX ORDER — TAMSULOSIN HYDROCHLORIDE 0.4 MG/1
CAPSULE ORAL
Qty: 90 CAPSULE | Refills: 3 | Status: ON HOLD | OUTPATIENT
Start: 2020-08-31 | End: 2021-08-13

## 2020-09-04 ENCOUNTER — LAB (OUTPATIENT)
Dept: LAB | Facility: HOSPITAL | Age: 55
End: 2020-09-04

## 2020-09-04 DIAGNOSIS — R97.20 ELEVATED PSA: Primary | ICD-10-CM

## 2020-09-04 PROCEDURE — 84153 ASSAY OF PSA TOTAL: CPT | Performed by: UROLOGY

## 2020-09-04 PROCEDURE — 36415 COLL VENOUS BLD VENIPUNCTURE: CPT

## 2020-09-04 PROCEDURE — 84154 ASSAY OF PSA FREE: CPT | Performed by: UROLOGY

## 2020-09-05 LAB
PSA FREE MFR SERPL: 14.8 %
PSA FREE SERPL-MCNC: 1.21 NG/ML
PSA SERPL-MCNC: 8.2 NG/ML (ref 0–4)

## 2020-09-08 NOTE — PROGRESS NOTES
Subjective    Mr. Piedra is 55 y.o. male    Chief Complaint: Elevated PSA/BPH.    History of Present Illness  Benign Prostatic Hypertrophy  Patient complains of lower urinary tract symptoms. He reports frequency, incomplete emptying, intermittency, nocturia three times a night, urgency and weak stream. He denies straining. Patient states symptoms are of mild severity. Onset of symptoms was  several years ago and was gradual in onset. His AUA Symptom Score is, 12/35.He reports a history of no complicating symptoms. He denies flank pain, gross hematuria, kidney stones and recurrent UTI. Patient has tried Alpha blockers with improvement. Last PSA was pending .        Lab Results   Component Value Date    PSA 8.2 (H) 09/04/2020    PSA 5.7 (H) 06/13/2019    PSA 4.4 (H) 12/07/2018         The following portions of the patient's history were reviewed and updated as appropriate: allergies, current medications, past family history, past medical history, past social history, past surgical history and problem list.    Review of Systems   Constitutional: Negative for chills and fever.   Gastrointestinal: Negative for abdominal pain, anal bleeding and blood in stool.   Genitourinary: Positive for frequency and urgency. Negative for dysuria and hematuria.         Current Outpatient Medications:   •  atorvastatin (LIPITOR) 20 MG tablet, TAKE 1 TABLET BY MOUTH EVERY DAY, Disp: , Rfl: 3  •  Coenzyme Q10 (CO Q 10) 100 MG capsule, Take 300 mg by mouth., Disp: , Rfl:   •  furosemide (LASIX) 40 MG tablet, , Disp: , Rfl:   •  metoprolol tartrate (LOPRESSOR) 25 MG tablet, TAKE 1 TABLET BY MOUTH TWICE A DAY, Disp: , Rfl: 3  •  mupirocin (BACTROBAN) 2 % ointment, APPLY PEA SIZED AMOUNT TO EACH NOSTRIL TWICE DAILY STARTING ON 8/17 FOLLOWING C/COVID 19 TEST., Disp: , Rfl:   •  potassium chloride (K-DUR,KLOR-CON) 20 MEQ CR tablet, Take 20 mEq by mouth Daily., Disp: , Rfl:   •  SM ASPIRIN ADULT LOW STRENGTH 81 MG EC tablet, , Disp: , Rfl:   •   "tamsulosin (FLOMAX) 0.4 MG capsule 24 hr capsule, TAKE 1 CAPSULE BY MOUTH EVERY DAY, Disp: 90 capsule, Rfl: 3    Past Medical History:   Diagnosis Date   • Benign hypertension    • Cardiac dysrhythmia    • Generalized anxiety disorder    • GERD (gastroesophageal reflux disease)    • Hyperlipidemia        Past Surgical History:   Procedure Laterality Date   • APPENDECTOMY     • CARDIAC ABLATION     • CARDIOVERSION  2019    [place]       Social History     Socioeconomic History   • Marital status:      Spouse name: Not on file   • Number of children: Not on file   • Years of education: Not on file   • Highest education level: Not on file   Tobacco Use   • Smoking status: Former Smoker     Years: 20.00     Types: Cigarettes     Last attempt to quit:      Years since quittin.7   • Smokeless tobacco: Former User     Types: Chew   Substance and Sexual Activity   • Alcohol use: Yes     Comment: Occasional   • Drug use: No   • Sexual activity: Defer       Family History   Problem Relation Age of Onset   • Prostate cancer Father    • Coronary artery disease Mother    • Coronary artery disease Brother    • No Known Problems Sister    • No Known Problems Sister    • No Known Problems Sister        Objective    Temp 98.5 °F (36.9 °C) (Temporal)   Ht 195.6 cm (77\")   Wt 109 kg (240 lb 9.6 oz)   BMI 28.53 kg/m²     Physical Exam   Constitutional: He is oriented to person, place, and time. He appears well-developed and well-nourished.   Pulmonary/Chest: Effort normal.   Abdominal: Soft. He exhibits no distension and no mass. There is no tenderness. There is no rebound and no guarding. No hernia.   Genitourinary: Rectal exam shows no mass, no tenderness and anal tone normal. Enlarged: for the age of the patient. Right testis shows no mass, no swelling and no tenderness. Left testis shows no mass, no swelling and no tenderness. No hypospadias. No discharge found.   Genitourinary Comments:  The urethral " meatus normal in position without evidence of stricture. Epididymis without mass or tenderness. Vas Deferens is palpably normal.Anus and perineum without mass or tenderness. The prostate is approximately 60 ml. It is Symmetric, with a Soft consistency. There are no nodules present. . The seminal vesicles are Not palpable due to the size of the prostate.     Neurological: He is alert and oriented to person, place, and time.   Vitals reviewed.          Results for orders placed or performed in visit on 09/10/20   POC Urinalysis Dipstick, Multipro   Result Value Ref Range    Color Yellow Yellow, Straw, Dark Yellow, Lisa    Clarity, UA Clear Clear    Glucose, UA Negative Negative, 1000 mg/dL (3+) mg/dL    Bilirubin Negative Negative    Ketones, UA Negative Negative    Specific Gravity  1.020 1.005 - 1.030    Blood, UA Negative Negative    pH, Urine 5.0 5.0 - 8.0    Protein, POC Negative Negative mg/dL    Urobilinogen, UA Normal Normal    Nitrite, UA Negative Negative    Leukocytes Negative Negative     Assessment and Plan    Diagnoses and all orders for this visit:    Elevated PSA  -     POC Urinalysis Dipstick, Multipro    BPH with urinary obstruction    Impotence of organic origin    Patient had a negative biopsy for a PSA of 6.3 7/2016.     PSA today is 8.2.  This is an increase from his previous level.  He recently was in hospital and had a catheter in which could account for PSA elevation.  If his PSA continues to elevate we will proceed with MRI.     He is maintained on Flomax for his voiding symptoms.  He has an AUA symptom score of 12/35.  He is happy with how he is voiding.     Follow up in 6 months with PSA.

## 2020-09-10 ENCOUNTER — OFFICE VISIT (OUTPATIENT)
Dept: UROLOGY | Facility: CLINIC | Age: 55
End: 2020-09-10

## 2020-09-10 VITALS — TEMPERATURE: 98.5 F | BODY MASS INDEX: 28.41 KG/M2 | HEIGHT: 77 IN | WEIGHT: 240.6 LBS

## 2020-09-10 DIAGNOSIS — N40.1 BPH WITH URINARY OBSTRUCTION: ICD-10-CM

## 2020-09-10 DIAGNOSIS — R97.20 ELEVATED PSA: Primary | ICD-10-CM

## 2020-09-10 DIAGNOSIS — N52.9 IMPOTENCE OF ORGANIC ORIGIN: ICD-10-CM

## 2020-09-10 DIAGNOSIS — N13.8 BPH WITH URINARY OBSTRUCTION: ICD-10-CM

## 2020-09-10 PROCEDURE — 81001 URINALYSIS AUTO W/SCOPE: CPT | Performed by: UROLOGY

## 2020-09-10 PROCEDURE — 99214 OFFICE O/P EST MOD 30 MIN: CPT | Performed by: UROLOGY

## 2020-09-10 RX ORDER — POTASSIUM CHLORIDE 20 MEQ/1
20 TABLET, EXTENDED RELEASE ORAL DAILY
COMMUNITY
Start: 2020-08-24 | End: 2020-09-24

## 2020-09-10 RX ORDER — FUROSEMIDE 40 MG/1
20 TABLET ORAL DAILY
COMMUNITY
Start: 2020-08-24 | End: 2021-08-20 | Stop reason: HOSPADM

## 2020-09-10 RX ORDER — ASPIRIN 81 MG/1
TABLET, DELAYED RELEASE ORAL
COMMUNITY
Start: 2020-08-24 | End: 2021-03-24

## 2020-10-11 ENCOUNTER — OFFICE VISIT (OUTPATIENT)
Age: 55
End: 2020-10-11

## 2020-10-11 VITALS — HEART RATE: 74 BPM | OXYGEN SATURATION: 96 % | TEMPERATURE: 97.7 F

## 2020-10-22 ENCOUNTER — OFFICE VISIT (OUTPATIENT)
Dept: CARDIOLOGY | Age: 55
End: 2020-10-22
Payer: COMMERCIAL

## 2020-10-22 VITALS
DIASTOLIC BLOOD PRESSURE: 74 MMHG | HEIGHT: 77 IN | SYSTOLIC BLOOD PRESSURE: 156 MMHG | HEART RATE: 87 BPM | BODY MASS INDEX: 27.87 KG/M2 | WEIGHT: 236 LBS

## 2020-10-22 PROCEDURE — 99203 OFFICE O/P NEW LOW 30 MIN: CPT | Performed by: CLINICAL NURSE SPECIALIST

## 2020-10-22 PROCEDURE — 93000 ELECTROCARDIOGRAM COMPLETE: CPT | Performed by: CLINICAL NURSE SPECIALIST

## 2020-10-22 RX ORDER — LOSARTAN POTASSIUM 25 MG/1
25 TABLET ORAL 2 TIMES DAILY
COMMUNITY
Start: 2020-10-14 | End: 2020-11-13 | Stop reason: SDUPTHER

## 2020-10-22 RX ORDER — FUROSEMIDE 40 MG/1
40 TABLET ORAL DAILY
COMMUNITY
End: 2020-11-13 | Stop reason: SDUPTHER

## 2020-10-22 RX ORDER — ATORVASTATIN CALCIUM 20 MG/1
20 TABLET, FILM COATED ORAL DAILY
COMMUNITY
Start: 2020-05-13 | End: 2022-01-04

## 2020-10-22 RX ORDER — ASPIRIN 81 MG/1
1 TABLET ORAL DAILY
COMMUNITY
Start: 2020-08-24 | End: 2020-10-22

## 2020-10-22 RX ORDER — TAMSULOSIN HYDROCHLORIDE 0.4 MG/1
1 CAPSULE ORAL DAILY
COMMUNITY
Start: 2020-08-31

## 2020-10-22 RX ORDER — POTASSIUM CHLORIDE 1500 MG/1
1 TABLET, FILM COATED, EXTENDED RELEASE ORAL DAILY
COMMUNITY
Start: 2020-09-21 | End: 2020-11-13 | Stop reason: SDUPTHER

## 2020-10-22 NOTE — PROGRESS NOTES
[facility], [address], [address] [address]  Phone: [phone]  Fax: [phone]    OFFICE VISIT:  10/22/2020    Guillermo Chaudhry - : 1965    Dear Mariajose Yang, and Dr Xu Salazar,    I appreciate the opportunity of participating in the care of Guillermo Chaudhry. He is a very pleasant 63 y.o. male who I had the opportunity of seeing in my office today, 10/22/20. Records from your office have been obtained and reviewed. Reason For Visit:  Ana Roca is a 63 y.o. male who is here for New Patient (Patient c/o some shortness of  breath.)  History of severe MR, hypertension, hyperlipidemia, GERD atrial fibrillation and BPH. He underwent previous ablation then had recurrent atrial fibrillation. He had congestive heart failure symptoms while in atrial fibrillation. On 2020, the patient underwent a Mini MVr, Ligation L Atrial Appendage, and MAZE with Dr. Brayan Carter. The intraoperatiave AYANNA reveals: LVEF 40-45% with mild RV dysfunction    Patient was released from CT surgery 2020  Is following DALI Velasquez at [facility]. He currently has a loop recorder in  Here to establish care with local cardiologist-recently followed with Dr. Moris Taylor at [facility]    He was scheduled to do cardiac rehab but has been doing some increased physical activity at home. His wife just recently had surgery and so he is having to help care for her. 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 He states he is slowly increasing activity with no recurrent symptoms. Heart rate and blood pressure been well controlled. Does not notice any recurrent arrhythmia or any fluid retention      Subjective  Ana Roca has no exertional chest pain, pressure, burning or squeezing. He is able to lie flat without evidence of orthopnea or paroxysmal nocturnal dyspnea. No symptomatic tachy- or alcon-arrhythmia.   No numbness or weakness to suggest cerebrovascular accident or transient ischemic attack. Reports no  edema. Drake Tapia has the following history as recorded in EpicCare:  Patient Active Problem List    Diagnosis Date Noted    Mitral regurgitation     Hypertension     Atrial fibrillation [facility])      Past Medical History:   Diagnosis Date    Atrial fibrillation ([address]     Hyperlipidemia     Hypertension     LV dysfunction     Mitral regurgitation      Past Surgical History:   Procedure Laterality Date    APPENDECTOMY      ATRIAL ABLATION SURGERY      CARDIOVERSION      MITRAL VALVE REPAIR  08/19/2020    Mini MVR, ligation of left atrial appendage and maze procedure-[place]     Family History   Problem Relation Age of Onset    Heart Attack Mother     Heart Surgery Mother     Heart Surgery Brother      Social History     Tobacco Use    Smoking status: Former Smoker     Last attempt to quit: 10/22/2005     Years since quitting: 15.0    Smokeless tobacco: Former User   Substance Use Topics    Alcohol use: Yes     Comment: socially        Allergies: Penicillins    Current Outpatient Medications   Medication Sig Dispense Refill    apixaban (ELIQUIS) 5 MG TABS tablet Take 1 tablet by mouth 2 times daily      atorvastatin (LIPITOR) 20 MG tablet Take 20 mg by mouth daily      losartan (COZAAR) 25 MG tablet Take 25 mg by mouth 2 times daily       potassium chloride (KLOR-CON M) 20 MEQ TBCR extended release tablet Take 1 tablet by mouth daily      tamsulosin (FLOMAX) 0.4 MG capsule Take 1 capsule by mouth daily      furosemide (LASIX) 40 MG tablet Take 40 mg by mouth daily      metoprolol tartrate (LOPRESSOR) 25 MG tablet Take 25 mg by mouth 2 times daily      Coenzyme Q10 300 MG CAPS Take 300 mg by mouth daily      aspirin (SM ASPIRIN ADULT LOW STRENGTH) 81 MG EC tablet Take 1 tablet by mouth daily       No current facility-administered medications for this visit.         Review of Systems  Constitutional - no significant activity change, appetite change, or unexpected weight change. No fever, chills or diaphoresis. No fatigue. HEENT - no significant rhinorrhea or epistaxis. No tinnitus or significant hearing loss. Eyes - no sudden vision change or amaurosis. Respiratory - no significant wheezing, stridor, apnea or cough. No dyspnea on exertion or shortness of breath. Cardiovascular - no exertional chest pain, orthopnea or PND. No sensation of arrhythmia or slow heart rate. No claudication or leg edema. Gastrointestinal - no abdominal swelling or pain. No blood in stool. No severe constipation, diarrhea, nausea, or vomiting. Genitourinary - no difficulty urinating, dysuria, frequency, or urgency. No flank pain or hematuria. No  previous radiation or chemotherapy  Musculoskeletal - no back pain, gait disturbance, or myalgia. Skin - no color change or rash. No pallor. No new surgical incision. Neurologic - no speech difficulty, facial asymmetry or lateralizing weakness. No seizures, presyncope, syncope, or significant dizziness. Hematologic - no easy bruising or excessive bleeding. Psychiatric - no severe anxiety or insomnia. No confusion. All other review of systems are negative. Objective  Vital Signs - BP (!) 156/74   Pulse 87   Ht 6' 5\" (1.956 m)   Wt 236 lb (107 kg)   BMI 27.99 kg/m²   General - Ml Snyder is alert, cooperative, and pleasant. Well groomed. No acute distress. Body habitus is normal.  HEENT - The head is normocephalic. No circumoral cyanosis. Dentition is normal.   Ears and nose externally normal. No abnormal scars or lesions noted  EYES -  No Xanthelasma, no arcus senilis, no conjunctival hemorrhages or discharge. Neck - Supple, without increased jugular venous pressures. No carotid bruits. No mass. Respiratory - Lungs are clear bilaterally. No wheezes or rales. Normal effort without use of accessory muscles. No tactile fremitus on palpation  Cardiovascular - Heart has irregular rhythm and rate.   No murmurs, rubs or gallops. + pedal pulses and no varicosities. Abdominal -  Soft, nontender, nondistended. Bowel sounds are intact. Extremities - No clubbing, cyanosis, or  edema. Musculoskeletal - No musculoskeletal symptoms. No clubbing . No Osler's nodes. Gait normal .  No kyphosis or scoliosis. Skin -  no statis ulcers or dermatitis. Neurological - No focal signs are identified. Oriented to person, place and time. Psychiatric -  Appropriate affect and mood. Assessment:          Diagnosis Orders   1. Palpitations  EKG 12 lead   2. Nonrheumatic mitral valve regurgitation     3. Essential hypertension     4. Paroxysmal atrial fibrillation (HCC)     5. S/P MVR (mitral valve repair)     EKG today shows sinus rhythm with a rate of 87  Nonspecific QRS widening with frequent multifocal PVCs    Blood pressure and heart rate controlled. Medical management includes  Beta-blocker, ARB, statin and remains anticoagulated on Eliquis    Progressing well from his surgery. Being followed closely by Dr. João Allen with his loop recorder for recurrent A. fib. Did have ligation of his appendage as well is on anticoagulation. We discussed signs and symptoms of recurrent arrhythmia and any fluid retention indicate worsening congestive heart failure. We will follow him up in 6 months or as needed should he develop any symptoms. Plan  Let us know if you have any signs of recurrent afib  Follow up in 6 mos  Call with any questions or concerns  Follow up with PCP for non cardiac problems  Report any new problems  Cardiovascular Fitness-Exercise as tolerated. Strive for 30 minutes of exercise most days of the week. Cardiac / Healthy Diet  Continue current medications as directed  Continue plan of treatment        I appreciate the opportunity of participating in the care and treatment of this patient.      BRICE García    EMR dragon/transcription disclaimer: Much of this encounter note is electronic transcription/translation of spoken language to printed tach. Electronic translation of spoken language may be erroneous, or at times, nonsensical words or phrases may be inadvertently transcribed.  Although, I have reviewed the note for such errors, some may still exist.      Cc:  BRICE Cassidy - NP

## 2020-10-22 NOTE — PATIENT INSTRUCTIONS
Let us know if you have any signs of recurrent afib  Follow up in 6 mos- can establish with Afib   Call with any questions or concerns  Follow up with PCP for non cardiac problems  Report any new problems  Cardiovascular Fitness-Exercise as tolerated. Strive for 30 minutes of exercise most days of the week.     Cardiac / Healthy Diet  Continue current medications as directed  Continue plan of treatment

## 2020-11-13 RX ORDER — POTASSIUM CHLORIDE 1500 MG/1
20 TABLET, FILM COATED, EXTENDED RELEASE ORAL DAILY
Qty: 30 TABLET | Refills: 5 | Status: SHIPPED | OUTPATIENT
Start: 2020-11-13 | End: 2022-01-04

## 2020-11-13 RX ORDER — LOSARTAN POTASSIUM 25 MG/1
25 TABLET ORAL 2 TIMES DAILY
Qty: 60 TABLET | Refills: 5 | Status: SHIPPED | OUTPATIENT
Start: 2020-11-13 | End: 2021-06-29 | Stop reason: DRUGHIGH

## 2020-11-13 RX ORDER — FUROSEMIDE 40 MG/1
40 TABLET ORAL DAILY
Qty: 30 TABLET | Refills: 5 | Status: SHIPPED | OUTPATIENT
Start: 2020-11-13

## 2020-11-19 ENCOUNTER — TELEPHONE (OUTPATIENT)
Dept: CARDIOLOGY | Age: 55
End: 2020-11-19

## 2020-11-19 NOTE — TELEPHONE ENCOUNTER
[employer] sent Disability benefits insurance forms to [facility]. Per Gamaliel Hickman has to fill these out due to pt having all his procedures done there. I called pt and left message explaining this to pt. I scanned a copy of paperwork into media.

## 2021-03-03 DIAGNOSIS — R97.20 ELEVATED PSA: ICD-10-CM

## 2021-03-04 LAB — PSA SERPL-MCNC: 8.03 NG/ML (ref 0–4)

## 2021-03-04 NOTE — PROGRESS NOTES
Subjective    Mr. Piedra is 55 y.o. male    Chief Complaint: Elevated PSA.     History of Present Illness  Benign Prostatic Hypertrophy  Patient complains of lower urinary tract symptoms. He reports frequency, incomplete emptying, intermittency, nocturia three times a night, urgency and weak stream. He denies straining. Patient states symptoms are of mild severity. Onset of symptoms was  several years ago and was gradual in onset. His AUA Symptom Score is, 23/35.He reports a history of no complicating symptoms. He denies flank pain, gross hematuria, kidney stones and recurrent UTI. Patient has tried Alpha blockers with improvement. Last PSA was pending .      Lab Results   Component Value Date    PSA 8.030 (H) 03/03/2021    PSA 8.2 (H) 09/04/2020    PSA 5.7 (H) 06/13/2019         The following portions of the patient's history were reviewed and updated as appropriate: allergies, current medications, past family history, past medical history, past social history, past surgical history and problem list.    Review of Systems   Constitutional: Negative for chills and fever.   Gastrointestinal: Negative for abdominal pain, anal bleeding and blood in stool.   Genitourinary: Positive for frequency and urgency. Negative for hematuria.         Current Outpatient Medications:   •  apixaban (ELIQUIS) 5 MG tablet tablet, Take 5 mg by mouth., Disp: , Rfl:   •  atorvastatin (LIPITOR) 20 MG tablet, TAKE 1 TABLET BY MOUTH EVERY DAY, Disp: , Rfl: 3  •  Coenzyme Q10 (CO Q 10) 100 MG capsule, Take 300 mg by mouth., Disp: , Rfl:   •  furosemide (LASIX) 40 MG tablet, , Disp: , Rfl:   •  losartan (COZAAR) 25 MG tablet, Take 25 mg by mouth 2 (Two) Times a Day., Disp: , Rfl:   •  metoprolol succinate XL (TOPROL-XL) 50 MG 24 hr tablet, Take 50 mg by mouth Daily., Disp: , Rfl:   •  mupirocin (BACTROBAN) 2 % ointment, APPLY PEA SIZED AMOUNT TO EACH NOSTRIL TWICE DAILY STARTING ON 8/17 FOLLOWING [facility]/COVID 19 TEST., Disp: , Rfl:   •  potassium  "chloride (K-DUR,KLOR-CON) 20 MEQ CR tablet, Take 20 mEq by mouth Daily., Disp: , Rfl:   •  tamsulosin (FLOMAX) 0.4 MG capsule 24 hr capsule, TAKE 1 CAPSULE BY MOUTH EVERY DAY, Disp: 90 capsule, Rfl: 3  •  SM ASPIRIN ADULT LOW STRENGTH 81 MG EC tablet, , Disp: , Rfl:     Past Medical History:   Diagnosis Date   • Benign hypertension    • Cardiac dysrhythmia    • Generalized anxiety disorder    • GERD (gastroesophageal reflux disease)    • Hyperlipidemia        Past Surgical History:   Procedure Laterality Date   • APPENDECTOMY     • CARDIAC ABLATION     • CARDIAC VALVE REPLACEMENT     • CARDIOVERSION  2019    [place]       Social History     Socioeconomic History   • Marital status:      Spouse name: Not on file   • Number of children: Not on file   • Years of education: Not on file   • Highest education level: Not on file   Tobacco Use   • Smoking status: Former Smoker     Years: 20.00     Types: Cigarettes     Quit date:      Years since quittin.1   • Smokeless tobacco: Former User     Types: Chew   Vaping Use   • Vaping Use: Never used   Substance and Sexual Activity   • Alcohol use: Yes     Comment: Occasional   • Drug use: No   • Sexual activity: Defer       Family History   Problem Relation Age of Onset   • Prostate cancer Father    • Coronary artery disease Mother    • Coronary artery disease Brother    • No Known Problems Sister    • No Known Problems Sister    • No Known Problems Sister        Objective    Temp 97.4 °F (36.3 °C) (Temporal)   Ht 195.6 cm (77\")   Wt 106 kg (234 lb)   BMI 27.75 kg/m²     Physical Exam        Results for orders placed or performed in visit on 03/10/21   POC Urinalysis Dipstick, Multipro    Specimen: Urine   Result Value Ref Range    Color Yellow Yellow, Straw, Dark Yellow, Lisa    Clarity, UA Clear Clear    Glucose, UA Negative Negative, 1000 mg/dL (3+) mg/dL    Bilirubin Negative Negative    Ketones, UA Negative Negative    Specific Gravity  1.025 " 1.005 - 1.030    Blood, UA Negative Negative    pH, Urine 5.5 5.0 - 8.0    Protein, POC Negative Negative mg/dL    Urobilinogen, UA Normal Normal    Nitrite, UA Negative Negative    Leukocytes Negative Negative     Assessment and Plan    Diagnoses and all orders for this visit:    1. Elevated PSA (Primary)  -     POC Urinalysis Dipstick, Multipro  -     MRI Pelvis With & Without Contrast; Future    2. BPH with urinary obstruction    3. Impotence of organic origin       Patient had a negative biopsy for a PSA of 6.3 7/2016.     PSA today is 8.03. Patient has been as high as 8.9.       He is maintained on Flomax for his voiding symptoms.  He has an AUA symptom score of 23/35.  This is mainly related to his Lasix.    Proceed with MRI of his prostate since his PSA remains elevated after recheck.  Follow-up with results of MRI.

## 2021-03-10 ENCOUNTER — TELEPHONE (OUTPATIENT)
Dept: UROLOGY | Facility: CLINIC | Age: 56
End: 2021-03-10

## 2021-03-10 ENCOUNTER — OFFICE VISIT (OUTPATIENT)
Dept: UROLOGY | Facility: CLINIC | Age: 56
End: 2021-03-10

## 2021-03-10 VITALS — WEIGHT: 234 LBS | HEIGHT: 77 IN | BODY MASS INDEX: 27.63 KG/M2 | TEMPERATURE: 97.4 F

## 2021-03-10 DIAGNOSIS — N40.1 BPH WITH URINARY OBSTRUCTION: ICD-10-CM

## 2021-03-10 DIAGNOSIS — R97.20 ELEVATED PSA: Primary | ICD-10-CM

## 2021-03-10 DIAGNOSIS — N52.9 IMPOTENCE OF ORGANIC ORIGIN: ICD-10-CM

## 2021-03-10 DIAGNOSIS — N13.8 BPH WITH URINARY OBSTRUCTION: ICD-10-CM

## 2021-03-10 LAB
BILIRUB BLD-MCNC: NEGATIVE MG/DL
CLARITY, POC: CLEAR
COLOR UR: YELLOW
GLUCOSE UR STRIP-MCNC: NEGATIVE MG/DL
KETONES UR QL: NEGATIVE
LEUKOCYTE EST, POC: NEGATIVE
NITRITE UR-MCNC: NEGATIVE MG/ML
PH UR: 5.5 [PH] (ref 5–8)
PROT UR STRIP-MCNC: NEGATIVE MG/DL
RBC # UR STRIP: NEGATIVE /UL
SP GR UR: 1.02 (ref 1–1.03)
UROBILINOGEN UR QL: NORMAL

## 2021-03-10 PROCEDURE — 99214 OFFICE O/P EST MOD 30 MIN: CPT | Performed by: UROLOGY

## 2021-03-10 PROCEDURE — 81003 URINALYSIS AUTO W/O SCOPE: CPT | Performed by: UROLOGY

## 2021-03-10 RX ORDER — LOSARTAN POTASSIUM 25 MG/1
25 TABLET ORAL 2 TIMES DAILY
COMMUNITY
Start: 2021-01-09 | End: 2021-07-29

## 2021-03-10 RX ORDER — METOPROLOL SUCCINATE 50 MG/1
50 TABLET, EXTENDED RELEASE ORAL 2 TIMES DAILY
COMMUNITY
Start: 2020-12-16 | End: 2021-07-29

## 2021-03-10 RX ORDER — LEVOFLOXACIN 500 MG/1
TABLET, FILM COATED ORAL
COMMUNITY
Start: 2021-03-04 | End: 2021-03-10

## 2021-03-10 RX ORDER — POTASSIUM CHLORIDE 20 MEQ/1
20 TABLET, EXTENDED RELEASE ORAL DAILY
COMMUNITY
Start: 2020-11-13 | End: 2021-08-20 | Stop reason: HOSPADM

## 2021-03-24 ENCOUNTER — HOSPITAL ENCOUNTER (OUTPATIENT)
Dept: MRI IMAGING | Facility: HOSPITAL | Age: 56
Discharge: HOME | End: 2021-03-24
Admitting: UROLOGY

## 2021-03-24 ENCOUNTER — OFFICE VISIT (OUTPATIENT)
Dept: FAMILY MEDICINE CLINIC | Facility: CLINIC | Age: 56
End: 2021-03-24

## 2021-03-24 VITALS
WEIGHT: 235 LBS | DIASTOLIC BLOOD PRESSURE: 84 MMHG | RESPIRATION RATE: 16 BRPM | HEIGHT: 77 IN | OXYGEN SATURATION: 98 % | SYSTOLIC BLOOD PRESSURE: 120 MMHG | BODY MASS INDEX: 27.75 KG/M2 | HEART RATE: 74 BPM

## 2021-03-24 DIAGNOSIS — R10.9 FLANK PAIN: ICD-10-CM

## 2021-03-24 DIAGNOSIS — E78.5 HYPERLIPIDEMIA, UNSPECIFIED HYPERLIPIDEMIA TYPE: ICD-10-CM

## 2021-03-24 DIAGNOSIS — R00.2 PALPITATIONS: ICD-10-CM

## 2021-03-24 DIAGNOSIS — R97.20 ELEVATED PSA: ICD-10-CM

## 2021-03-24 DIAGNOSIS — I49.3 PVC (PREMATURE VENTRICULAR CONTRACTION): Primary | ICD-10-CM

## 2021-03-24 DIAGNOSIS — I10 ESSENTIAL HYPERTENSION: ICD-10-CM

## 2021-03-24 LAB — CREAT BLDA-MCNC: 0.9 MG/DL (ref 0.6–1.3)

## 2021-03-24 PROCEDURE — A9577 INJ MULTIHANCE: HCPCS | Performed by: UROLOGY

## 2021-03-24 PROCEDURE — 99203 OFFICE O/P NEW LOW 30 MIN: CPT | Performed by: FAMILY MEDICINE

## 2021-03-24 PROCEDURE — 82565 ASSAY OF CREATININE: CPT

## 2021-03-24 PROCEDURE — 0 GADOBENATE DIMEGLUMINE 529 MG/ML SOLUTION: Performed by: UROLOGY

## 2021-03-24 PROCEDURE — 72197 MRI PELVIS W/O & W/DYE: CPT

## 2021-03-24 RX ADMIN — GADOBENATE DIMEGLUMINE 20 ML: 529 INJECTION, SOLUTION INTRAVENOUS at 10:11

## 2021-03-24 NOTE — PROGRESS NOTES
Subjective   Robert Piedra is a 55 y.o. male.     Chief Complaint   Patient presents with   • Establish Care     new patient   • Flank Pain     pt states that he has right side flank pain off an on for approx 1 month        History of Present Illness     Initial visit for this nice man---hx of htn and afib...he is seeing dr de la garza for elevated psa and had mri of pelvis today  He noes right sided flnak pain for 3 weeks    Current Outpatient Medications:   •  apixaban (ELIQUIS) 5 MG tablet tablet, Take 5 mg by mouth., Disp: , Rfl:   •  atorvastatin (LIPITOR) 20 MG tablet, TAKE 1 TABLET BY MOUTH EVERY DAY, Disp: , Rfl: 3  •  Coenzyme Q10 (CO Q 10) 100 MG capsule, Take 300 mg by mouth., Disp: , Rfl:   •  furosemide (LASIX) 40 MG tablet, , Disp: , Rfl:   •  losartan (COZAAR) 25 MG tablet, Take 25 mg by mouth 2 (Two) Times a Day., Disp: , Rfl:   •  metoprolol succinate XL (TOPROL-XL) 50 MG 24 hr tablet, Take 50 mg by mouth Daily., Disp: , Rfl:   •  potassium chloride (K-DUR,KLOR-CON) 20 MEQ CR tablet, Take 20 mEq by mouth Daily., Disp: , Rfl:   •  tamsulosin (FLOMAX) 0.4 MG capsule 24 hr capsule, TAKE 1 CAPSULE BY MOUTH EVERY DAY, Disp: 90 capsule, Rfl: 3  No current facility-administered medications for this visit.  Allergies   Allergen Reactions   • Azithromycin Unknown - Low Severity   • Penicillins        Past Medical History:   Diagnosis Date   • Benign hypertension    • Cardiac dysrhythmia    • Generalized anxiety disorder    • GERD (gastroesophageal reflux disease)    • Hyperlipidemia      Past Surgical History:   Procedure Laterality Date   • APPENDECTOMY     • CARDIAC ABLATION     • CARDIAC VALVE REPLACEMENT     • CARDIOVERSION  09/2019    [place]       Review of Systems   Constitutional: Negative.    HENT: Negative.    Eyes: Negative.    Respiratory: Negative.    Cardiovascular: Positive for palpitations.   Gastrointestinal: Negative.    Endocrine: Negative.    Genitourinary: Negative.    Musculoskeletal:  "Negative.    Skin: Negative.    Allergic/Immunologic: Negative.    Neurological: Negative.    Hematological: Negative.    Psychiatric/Behavioral: Negative.        Objective  /84   Pulse 74   Resp 16   Ht 195.6 cm (77\")   Wt 107 kg (235 lb)   SpO2 98%   BMI 27.87 kg/m²   Physical Exam  Vitals and nursing note reviewed.   Constitutional:       Appearance: Normal appearance.   HENT:      Head: Normocephalic and atraumatic.      Nose: Nose normal.      Mouth/Throat:      Mouth: Mucous membranes are moist.      Pharynx: Oropharynx is clear.   Eyes:      Extraocular Movements: Extraocular movements intact.      Conjunctiva/sclera: Conjunctivae normal.      Pupils: Pupils are equal, round, and reactive to light.   Cardiovascular:      Rate and Rhythm: Normal rate.      Pulses: Normal pulses.      Heart sounds: Normal heart sounds.   Pulmonary:      Effort: Pulmonary effort is normal.      Breath sounds: Normal breath sounds.   Abdominal:      General: Abdomen is flat. Bowel sounds are normal.      Palpations: Abdomen is soft.   Musculoskeletal:         General: Normal range of motion.      Cervical back: Normal range of motion and neck supple.   Skin:     General: Skin is warm and dry.      Capillary Refill: Capillary refill takes less than 2 seconds.   Neurological:      General: No focal deficit present.      Mental Status: He is alert and oriented to person, place, and time. Mental status is at baseline.   Psychiatric:         Mood and Affect: Mood normal.         Behavior: Behavior normal.         Thought Content: Thought content normal.         Judgment: Judgment normal.         Assessment/Plan   Diagnoses and all orders for this visit:    1. PVC (premature ventricular contraction) (Primary)    2. Essential hypertension    3. Palpitations    4. Hyperlipidemia, unspecified hyperlipidemia type    5. Elevated PSA    6. Flank pain  -     CT Abdomen Pelvis Stone Protocol  -     Urinalysis without microscopic " (no culture) - Urine, Clean Catch      He will follow up with dr de la garza next week           Orders Placed This Encounter   Procedures   • CT Abdomen Pelvis Stone Protocol   • Urinalysis without microscopic (no culture) - Urine, Clean Catch       Follow up: 6 week(s)

## 2021-03-25 LAB
APPEARANCE UR: CLEAR
BILIRUB UR QL STRIP: NEGATIVE
COLOR UR: ABNORMAL
GLUCOSE UR QL: NEGATIVE
HGB UR QL STRIP: NEGATIVE
KETONES UR QL STRIP: ABNORMAL
LEUKOCYTE ESTERASE UR QL STRIP: ABNORMAL
NITRITE UR QL STRIP: NEGATIVE
PH UR STRIP: 5.5 [PH] (ref 5–8)
PROT UR QL STRIP: NEGATIVE
SP GR UR: ABNORMAL (ref 1–1.03)
UROBILINOGEN UR STRIP-MCNC: ABNORMAL MG/DL

## 2021-03-29 NOTE — PROGRESS NOTES
Subjective    Mr. Piedra is 55 y.o. male    Chief Complaint: Elevated PSA.     History of Present Illness  Benign Prostatic Hypertrophy  Patient complains of lower urinary tract symptoms. He reports frequency, incomplete emptying, intermittency, nocturia three times a night, urgency and weak stream. He denies straining. Patient states symptoms are of mild severity. Onset of symptoms was  several years ago and was gradual in onset. His AUA Symptom Score is, 23/35.He reports a history of no complicating symptoms. He denies flank pain, gross hematuria, kidney stones and recurrent UTI. Patient has tried Alpha blockers with improvement. Last PSA was pending .    Lab Results   Component Value Date    PSA 8.030 (H) 03/03/2021    PSA 8.2 (H) 09/04/2020    PSA 5.7 (H) 06/13/2019         The following portions of the patient's history were reviewed and updated as appropriate: allergies, current medications, past family history, past medical history, past social history, past surgical history and problem list.    Review of Systems   Constitutional: Negative for chills and fever.   Gastrointestinal: Negative for abdominal pain, anal bleeding and blood in stool.   Genitourinary: Positive for flank pain (right sided). Negative for dysuria, frequency, hematuria and urgency.         Current Outpatient Medications:   •  apixaban (ELIQUIS) 5 MG tablet tablet, Take 5 mg by mouth., Disp: , Rfl:   •  atorvastatin (LIPITOR) 20 MG tablet, TAKE 1 TABLET BY MOUTH EVERY DAY, Disp: , Rfl: 3  •  Coenzyme Q10 (CO Q 10) 100 MG capsule, Take 300 mg by mouth., Disp: , Rfl:   •  furosemide (LASIX) 40 MG tablet, , Disp: , Rfl:   •  losartan (COZAAR) 25 MG tablet, Take 25 mg by mouth 2 (Two) Times a Day., Disp: , Rfl:   •  metoprolol succinate XL (TOPROL-XL) 50 MG 24 hr tablet, Take 50 mg by mouth Daily., Disp: , Rfl:   •  potassium chloride (K-DUR,KLOR-CON) 20 MEQ CR tablet, Take 20 mEq by mouth Daily., Disp: , Rfl:   •  tamsulosin (FLOMAX) 0.4 MG  "capsule 24 hr capsule, TAKE 1 CAPSULE BY MOUTH EVERY DAY, Disp: 90 capsule, Rfl: 3    Past Medical History:   Diagnosis Date   • Benign hypertension    • Cardiac dysrhythmia    • Generalized anxiety disorder    • GERD (gastroesophageal reflux disease)    • Hyperlipidemia        Past Surgical History:   Procedure Laterality Date   • APPENDECTOMY     • CARDIAC ABLATION     • CARDIAC VALVE REPLACEMENT     • CARDIOVERSION  2019    [place]       Social History     Socioeconomic History   • Marital status:      Spouse name: Not on file   • Number of children: Not on file   • Years of education: Not on file   • Highest education level: Not on file   Tobacco Use   • Smoking status: Former Smoker     Years: 20.00     Types: Cigarettes     Quit date:      Years since quittin.2   • Smokeless tobacco: Former User     Types: Chew   Vaping Use   • Vaping Use: Never used   Substance and Sexual Activity   • Alcohol use: Yes     Comment: Occasional   • Drug use: No   • Sexual activity: Defer       Family History   Problem Relation Age of Onset   • Prostate cancer Father    • Coronary artery disease Mother    • Coronary artery disease Brother    • No Known Problems Sister    • No Known Problems Sister    • No Known Problems Sister        Objective    Temp 97 °F (36.1 °C) (Temporal)   Ht 195.6 cm (77\")   Wt 107 kg (235 lb)   BMI 27.87 kg/m²     Physical Exam        Results for orders placed or performed in visit on 21   POC Urinalysis Dipstick, Multipro    Specimen: Urine   Result Value Ref Range    Color Yellow Yellow, Straw, Dark Yellow, Lisa    Clarity, UA Clear Clear    Glucose, UA Negative Negative, 1000 mg/dL (3+) mg/dL    Bilirubin Negative Negative    Ketones, UA Negative Negative    Specific Gravity  1.025 1.005 - 1.030    Blood, UA Negative Negative    pH, Urine 5.5 5.0 - 8.0    Protein, POC 1+ (A) Negative mg/dL    Urobilinogen, UA Normal Normal    Nitrite, UA Negative Negative    Leukocytes " Negative Negative     Assessment and Plan    Diagnoses and all orders for this visit:    1. Elevated PSA (Primary)  -     POC Urinalysis Dipstick, Multipro  -     Case Request; Standing  -     sodium chloride 0.9 % infusion  -     clindamycin (CLEOCIN) 900 mg in dextrose (D5W) 5 % 100 mL IVPB  -     gentamicin (GARAMYCIN) 80 mg in sodium chloride 0.9 % 100 mL IVPB  -     Case Request    2. BPH with urinary obstruction    3. Impotence of organic origin    Other orders  -     Follow Anesthesia Guidelines / Standing Orders; Future  -     Obtain informed consent  -     Provide NPO Instructions to Patient; Future  -     Chlorhexidine Skin Prep; Future  -     Follow Anesthesia Guidelines / Standing Orders; Standing  -     Verify NPO Status; Standing  -     SCD (sequential compression device)- to be placed on patient in Pre-op; Standing  -     Verify / Perform Chlorhexidine Skin Prep; Standing  -     Verify / Perform Chlorhexidine Skin Prep if Indicated (If Not Already Completed); Standing       Patient had a negative biopsy for a PSA of 6.3 7/2016.     PSA today is 8.03. Patient has been as high as 8.9.       He is maintained on Flomax for his voiding symptoms.  He has an AUA symptom score of 23/35.  This is mainly related to his Lasix.     I personally reviewed his MRI.  This shows a volume of 103 cc.  He has 3 PI-RADS 3 lesions in the apex all on the left.  He is in the anterior transition zone.    Regarding his PSA and MRI,  I have recommended a transrectal ultrasound guided prostate biopsy with MRI FUSION.  We discussed risks including hematuria,  hematochezia, hematospermia.  I discussed the 4% risk of infection requiring hospitalization.  Also discussed 1% risk of urinary retention.  He will receive IV abx the day of the procedure. The patient voiced understanding of this and wishes to proceed.    He will have to come off his Eliquis prior.

## 2021-03-31 ENCOUNTER — OFFICE VISIT (OUTPATIENT)
Dept: UROLOGY | Facility: CLINIC | Age: 56
End: 2021-03-31

## 2021-03-31 VITALS — BODY MASS INDEX: 27.75 KG/M2 | WEIGHT: 235 LBS | HEIGHT: 77 IN | TEMPERATURE: 97 F

## 2021-03-31 DIAGNOSIS — N13.8 BPH WITH URINARY OBSTRUCTION: ICD-10-CM

## 2021-03-31 DIAGNOSIS — N40.1 BPH WITH URINARY OBSTRUCTION: ICD-10-CM

## 2021-03-31 DIAGNOSIS — R97.20 ELEVATED PSA: Primary | ICD-10-CM

## 2021-03-31 DIAGNOSIS — N52.9 IMPOTENCE OF ORGANIC ORIGIN: ICD-10-CM

## 2021-03-31 LAB
BILIRUB BLD-MCNC: NEGATIVE MG/DL
CLARITY, POC: CLEAR
COLOR UR: YELLOW
GLUCOSE UR STRIP-MCNC: NEGATIVE MG/DL
KETONES UR QL: NEGATIVE
LEUKOCYTE EST, POC: NEGATIVE
NITRITE UR-MCNC: NEGATIVE MG/ML
PH UR: 5.5 [PH] (ref 5–8)
PROT UR STRIP-MCNC: ABNORMAL MG/DL
RBC # UR STRIP: NEGATIVE /UL
SP GR UR: 1.02 (ref 1–1.03)
UROBILINOGEN UR QL: NORMAL

## 2021-03-31 PROCEDURE — 99214 OFFICE O/P EST MOD 30 MIN: CPT | Performed by: UROLOGY

## 2021-03-31 PROCEDURE — 81003 URINALYSIS AUTO W/O SCOPE: CPT | Performed by: UROLOGY

## 2021-03-31 RX ORDER — SODIUM CHLORIDE 9 MG/ML
100 INJECTION, SOLUTION INTRAVENOUS CONTINUOUS
Status: CANCELLED | OUTPATIENT
Start: 2021-03-31

## 2021-04-06 ENCOUNTER — HOSPITAL ENCOUNTER (OUTPATIENT)
Dept: CT IMAGING | Facility: HOSPITAL | Age: 56
Discharge: HOME | End: 2021-04-06
Admitting: FAMILY MEDICINE

## 2021-04-06 PROCEDURE — 74176 CT ABD & PELVIS W/O CONTRAST: CPT

## 2021-04-07 DIAGNOSIS — N28.89 RENAL MASS: Primary | ICD-10-CM

## 2021-04-12 ENCOUNTER — HOSPITAL ENCOUNTER (OUTPATIENT)
Dept: ULTRASOUND IMAGING | Facility: HOSPITAL | Age: 56
Discharge: HOME | End: 2021-04-12
Admitting: FAMILY MEDICINE

## 2021-04-12 PROCEDURE — 76775 US EXAM ABDO BACK WALL LIM: CPT

## 2021-04-23 ENCOUNTER — OFFICE VISIT (OUTPATIENT)
Dept: CARDIOLOGY CLINIC | Age: 56
End: 2021-04-23
Payer: COMMERCIAL

## 2021-04-23 VITALS
BODY MASS INDEX: 27.16 KG/M2 | SYSTOLIC BLOOD PRESSURE: 147 MMHG | HEIGHT: 77 IN | HEART RATE: 91 BPM | WEIGHT: 230 LBS | DIASTOLIC BLOOD PRESSURE: 99 MMHG

## 2021-04-23 DIAGNOSIS — R07.9 CHEST PAIN, UNSPECIFIED TYPE: Primary | ICD-10-CM

## 2021-04-23 DIAGNOSIS — R06.02 SOB (SHORTNESS OF BREATH): ICD-10-CM

## 2021-04-23 PROCEDURE — 99204 OFFICE O/P NEW MOD 45 MIN: CPT | Performed by: INTERNAL MEDICINE

## 2021-04-23 NOTE — PROGRESS NOTES
[facility], [address], [address] [facility]  Phone: [phone]  Fax: [phone]    OFFICE VISIT:  2021    Kirk Harden - : 1965    I appreciate the opportunity of participating in the care of Kirk Harden. He is a very pleasant 61 y.o. male who I had the opportunity of seeing in my office today, 21. Records from your office have been obtained and reviewed. Reason For Visit:  Yoon Desir is a 61 y.o. male who is here for 6 Month Follow-Up (some shortness of breath and chest pain while chasing a cow)      Yoon Desir is known to have a history of severe MR, hypertension, hyperlipidemia, GERD atrial fibrillation and BPH. He underwent previous ablation then had recurrent atrial fibrillation. He had congestive heart failure symptoms while in atrial fibrillation. On 2020, the patient underwent a Mini MVr, Ligation L Atrial Appendage, and MAZE with Dr. Rodolfo Ann. The intraoperatiave YAANNA reveals: LVEF 40-45% with mild RV dysfunction  He was placed on Eliquis 5 mg twice daily because his appendage closure was not complete. Patient was released from CT surgery 2020  Is following DALI Geiger at [facility].   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, he also felt very short of breath    He denies any palpitation or dizziness or syncope        Kirk Harden has the following history as recorded in EpicCare:  Patient Active Problem List    Diagnosis Date Noted    Mitral regurgitation      Priority: Low    Hypertension      Priority: Low    Atrial fibrillation ([facility] [address]      Priority: Low     Past Medical History:   Diagnosis Date    Atrial fibrillation ([address]     Hyperlipidemia     Hypertension     LV dysfunction     Mitral regurgitation      Past Surgical History:   Procedure Laterality Date    APPENDECTOMY      ATRIAL ABLATION SURGERY  2019    CARDIOVERSION      MITRAL VALVE REPAIR  08/19/2020    Mini MVR, ligation of left atrial appendage and maze procedure-[place]     Family History   Problem Relation Age of Onset    Heart Attack Mother     Heart Surgery Mother     Heart Surgery Brother      Social History     Tobacco Use    Smoking status: Former Smoker     Quit date: 10/22/2005     Years since quitting: 15.5    Smokeless tobacco: Former User   Substance Use Topics    Alcohol use: Yes     Comment: socially        Allergies: Penicillins    Current Outpatient Medications   Medication Sig Dispense Refill    furosemide (LASIX) 40 MG tablet Take 1 tablet by mouth daily 30 tablet 5    losartan (COZAAR) 25 MG tablet Take 1 tablet by mouth 2 times daily 60 tablet 5    metoprolol tartrate (LOPRESSOR) 25 MG tablet Take 1 tablet by mouth 2 times daily 60 tablet 5    potassium chloride (KLOR-CON M) 20 MEQ TBCR extended release tablet Take 1 tablet by mouth daily 30 tablet 5    apixaban (ELIQUIS) 5 MG TABS tablet Take 1 tablet by mouth 2 times daily 60 tablet 5    atorvastatin (LIPITOR) 20 MG tablet Take 20 mg by mouth daily      tamsulosin (FLOMAX) 0.4 MG capsule Take 1 capsule by mouth daily      Coenzyme Q10 300 MG CAPS Take 300 mg by mouth daily       No current facility-administered medications for this visit. Review of Systems  Constitutional  no significant activity change, appetite change, or unexpected weight change. No fever, chills or diaphoresis. No fatigue. HEENT  no significant rhinorrhea or epistaxis. No tinnitus or significant hearing loss. Eyes  no sudden vision change or amaurosis. Respiratory  no significant wheezing, stridor, apnea or cough. No dyspnea on exertion or shortness of breath. Cardiovascular  no exertional chest pain, orthopnea or PND.   No sensation of arrhythmia or slow heart rate. No claudication or leg edema. Gastrointestinal  no abdominal swelling or pain. No blood in stool. No severe constipation, diarrhea, nausea, or vomiting. Genitourinary  no difficulty urinating, dysuria, frequency, or urgency. No flank pain or hematuria. No  previous radiation or chemotherapy  Musculoskeletal  no back pain, gait disturbance, or myalgia. Skin  no color change or rash. No pallor. No new surgical incision. Neurologic  no speech difficulty, facial asymmetry or lateralizing weakness. No seizures, presyncope, syncope, or significant dizziness. Hematologic  no easy bruising or excessive bleeding. Psychiatric  no severe anxiety or insomnia. No confusion. All other review of systems are negative. Objective  Vital Signs - BP (!) 147/99   Pulse 91   Ht 6' 5\" (1.956 m)   Wt 230 lb (104.3 kg)   BMI 27.27 kg/m²   General - Royal Max is alert, cooperative, and pleasant. Well groomed. No acute distress. Body habitus is normal.  HEENT  The head is normocephalic. No circumoral cyanosis. Dentition is normal.   Ears and nose externally normal. No abnormal scars or lesions noted  EYES -  No Xanthelasma, no arcus senilis, no conjunctival hemorrhages or discharge. Neck - Supple, without increased jugular venous pressures. No carotid bruits. No mass. Respiratory - Lungs are clear bilaterally. No wheezes or rales. Normal effort without use of accessory muscles. No tactile fremitus on palpation  Cardiovascular  Heart has irregular rhythm and rate. No murmurs, rubs or gallops. + pedal pulses and no varicosities. Abdominal -  Soft, nontender, nondistended. Bowel sounds are intact. Extremities - No clubbing, cyanosis, or  edema. Musculoskeletal - No musculoskeletal symptoms. No clubbing . No Osler's nodes. Gait normal .  No kyphosis or scoliosis. Skin -  no statis ulcers or dermatitis.   Neurological - No focal signs are

## 2021-05-03 ENCOUNTER — APPOINTMENT (OUTPATIENT)
Dept: NUCLEAR MEDICINE | Age: 56
End: 2021-05-03
Payer: COMMERCIAL

## 2021-05-03 ENCOUNTER — APPOINTMENT (OUTPATIENT)
Dept: NON INVASIVE DIAGNOSTICS | Age: 56
End: 2021-05-03
Payer: COMMERCIAL

## 2021-05-07 ENCOUNTER — TRANSCRIBE ORDERS (OUTPATIENT)
Dept: ADMINISTRATIVE | Facility: HOSPITAL | Age: 56
End: 2021-05-07

## 2021-05-07 ENCOUNTER — TELEPHONE (OUTPATIENT)
Dept: UROLOGY | Facility: CLINIC | Age: 56
End: 2021-05-07

## 2021-05-07 ENCOUNTER — APPOINTMENT (OUTPATIENT)
Dept: PREADMISSION TESTING | Facility: HOSPITAL | Age: 56
End: 2021-05-07

## 2021-05-07 ENCOUNTER — OFFICE VISIT (OUTPATIENT)
Dept: FAMILY MEDICINE CLINIC | Facility: CLINIC | Age: 56
End: 2021-05-07

## 2021-05-07 VITALS
SYSTOLIC BLOOD PRESSURE: 144 MMHG | HEIGHT: 77 IN | RESPIRATION RATE: 16 BRPM | HEART RATE: 73 BPM | WEIGHT: 230 LBS | OXYGEN SATURATION: 98 % | BODY MASS INDEX: 27.16 KG/M2 | DIASTOLIC BLOOD PRESSURE: 88 MMHG

## 2021-05-07 DIAGNOSIS — I10 ESSENTIAL HYPERTENSION: Primary | ICD-10-CM

## 2021-05-07 DIAGNOSIS — Z11.59 SCREENING FOR VIRAL DISEASE: Primary | ICD-10-CM

## 2021-05-07 DIAGNOSIS — R97.20 ELEVATED PSA: ICD-10-CM

## 2021-05-07 DIAGNOSIS — E78.2 MIXED HYPERLIPIDEMIA: ICD-10-CM

## 2021-05-07 PROCEDURE — 99213 OFFICE O/P EST LOW 20 MIN: CPT | Performed by: FAMILY MEDICINE

## 2021-05-07 NOTE — PROGRESS NOTES
"Subjective   Robert Piedra is a 56 y.o. male.     Chief Complaint   Patient presents with   • Irregular Heart Beat     6 wk f/u   PVC   CC:\"im here about my visits\"    History of Present Illness     he is getting prostate biopsy from dr de la garza for next week---he insists bp is doing well at home --he is  toleiagn lipitor witout myalgis       Current Outpatient Medications:   •  apixaban (ELIQUIS) 5 MG tablet tablet, Take 5 mg by mouth., Disp: , Rfl:   •  atorvastatin (LIPITOR) 20 MG tablet, TAKE 1 TABLET BY MOUTH EVERY DAY, Disp: , Rfl: 3  •  Coenzyme Q10 (CO Q 10) 100 MG capsule, Take 300 mg by mouth., Disp: , Rfl:   •  furosemide (LASIX) 40 MG tablet, , Disp: , Rfl:   •  losartan (COZAAR) 25 MG tablet, Take 25 mg by mouth 2 (Two) Times a Day., Disp: , Rfl:   •  metoprolol succinate XL (TOPROL-XL) 50 MG 24 hr tablet, Take 50 mg by mouth Daily., Disp: , Rfl:   •  potassium chloride (K-DUR,KLOR-CON) 20 MEQ CR tablet, Take 20 mEq by mouth Daily., Disp: , Rfl:   •  tamsulosin (FLOMAX) 0.4 MG capsule 24 hr capsule, TAKE 1 CAPSULE BY MOUTH EVERY DAY, Disp: 90 capsule, Rfl: 3  Allergies   Allergen Reactions   • Azithromycin Unknown - Low Severity   • Penicillins        Past Medical History:   Diagnosis Date   • Benign hypertension    • Cardiac dysrhythmia    • Generalized anxiety disorder    • GERD (gastroesophageal reflux disease)    • Hyperlipidemia      Past Surgical History:   Procedure Laterality Date   • APPENDECTOMY     • CARDIAC ABLATION     • CARDIAC VALVE REPLACEMENT     • CARDIOVERSION  09/2019    [place]       Review of Systems   Constitutional: Negative.    HENT: Negative.    Eyes: Negative.    Respiratory: Negative.    Cardiovascular: Negative.    Gastrointestinal: Negative.    Endocrine: Negative.    Genitourinary: Positive for difficulty urinating.   Musculoskeletal: Negative.    Skin: Negative.    Allergic/Immunologic: Negative.    Neurological: Negative.    Hematological: Negative.  " "  Psychiatric/Behavioral: Negative.        Objective  /88   Pulse 73   Resp 16   Ht 195.6 cm (77\")   Wt 104 kg (230 lb)   SpO2 98%   BMI 27.27 kg/m²   Physical Exam  Vitals and nursing note reviewed.   Constitutional:       Appearance: Normal appearance. He is normal weight.   HENT:      Head: Normocephalic and atraumatic.      Nose: Nose normal.      Mouth/Throat:      Mouth: Mucous membranes are moist.   Eyes:      Extraocular Movements: Extraocular movements intact.      Conjunctiva/sclera: Conjunctivae normal.      Pupils: Pupils are equal, round, and reactive to light.   Cardiovascular:      Rate and Rhythm: Normal rate.      Pulses: Normal pulses.   Pulmonary:      Effort: Pulmonary effort is normal.   Abdominal:      General: Abdomen is flat.   Musculoskeletal:         General: Normal range of motion.      Cervical back: Normal range of motion and neck supple.   Skin:     General: Skin is warm.      Capillary Refill: Capillary refill takes less than 2 seconds.   Neurological:      General: No focal deficit present.      Mental Status: He is alert and oriented to person, place, and time. Mental status is at baseline.   Psychiatric:         Mood and Affect: Mood normal.         Behavior: Behavior normal.         Thought Content: Thought content normal.         Judgment: Judgment normal.         Assessment/Plan   Diagnoses and all orders for this visit:    1. Essential hypertension (Primary)    2. Mixed hyperlipidemia    3. Elevated PSA      He will discuss his renal mass with dr de la garza --he has prostate bx coming up    He will monitor bp and keep appwt with cardiology       No orders of the defined types were placed in this encounter.      Follow up: 4 month(s)  "

## 2021-05-07 NOTE — TELEPHONE ENCOUNTER
Called patient with surgery info scheduled 05-13-21. Arrive at 0700 on 05-13-21. NPO after MN. Stop Eliquis 2 days prior to surgery date. Fleets enema 2 hours prior to arrival day of surgery. Pre op 05-10-21 at 1115. All questions answered and he voiced understanding.

## 2021-05-10 ENCOUNTER — PRE-ADMISSION TESTING (OUTPATIENT)
Dept: PREADMISSION TESTING | Facility: HOSPITAL | Age: 56
End: 2021-05-10

## 2021-05-10 ENCOUNTER — LAB (OUTPATIENT)
Dept: LAB | Facility: HOSPITAL | Age: 56
End: 2021-05-10

## 2021-05-10 VITALS
OXYGEN SATURATION: 100 % | HEIGHT: 76 IN | RESPIRATION RATE: 18 BRPM | HEART RATE: 86 BPM | WEIGHT: 237.66 LBS | BODY MASS INDEX: 28.94 KG/M2 | SYSTOLIC BLOOD PRESSURE: 133 MMHG | DIASTOLIC BLOOD PRESSURE: 85 MMHG

## 2021-05-10 LAB
ANION GAP SERPL CALCULATED.3IONS-SCNC: 10 MMOL/L (ref 5–15)
BUN SERPL-MCNC: 12 MG/DL (ref 6–20)
BUN/CREAT SERPL: 15 (ref 7–25)
CALCIUM SPEC-SCNC: 9.7 MG/DL (ref 8.6–10.5)
CHLORIDE SERPL-SCNC: 101 MMOL/L (ref 98–107)
CO2 SERPL-SCNC: 28 MMOL/L (ref 22–29)
CREAT SERPL-MCNC: 0.8 MG/DL (ref 0.76–1.27)
DEPRECATED RDW RBC AUTO: 38.3 FL (ref 37–54)
ERYTHROCYTE [DISTWIDTH] IN BLOOD BY AUTOMATED COUNT: 11.9 % (ref 12.3–15.4)
GFR SERPL CREATININE-BSD FRML MDRD: 100 ML/MIN/1.73
GLUCOSE SERPL-MCNC: 118 MG/DL (ref 65–99)
HCT VFR BLD AUTO: 41 % (ref 37.5–51)
HGB BLD-MCNC: 14 G/DL (ref 13–17.7)
MCH RBC QN AUTO: 30 PG (ref 26.6–33)
MCHC RBC AUTO-ENTMCNC: 34.1 G/DL (ref 31.5–35.7)
MCV RBC AUTO: 88 FL (ref 79–97)
PLATELET # BLD AUTO: 313 10*3/MM3 (ref 140–450)
PMV BLD AUTO: 10.7 FL (ref 6–12)
POTASSIUM SERPL-SCNC: 4.3 MMOL/L (ref 3.5–5.2)
RBC # BLD AUTO: 4.66 10*6/MM3 (ref 4.14–5.8)
SARS-COV-2 ORF1AB RESP QL NAA+PROBE: NOT DETECTED
SODIUM SERPL-SCNC: 139 MMOL/L (ref 136–145)
WBC # BLD AUTO: 7.48 10*3/MM3 (ref 3.4–10.8)

## 2021-05-10 PROCEDURE — U0004 COV-19 TEST NON-CDC HGH THRU: HCPCS | Performed by: UROLOGY

## 2021-05-10 PROCEDURE — C9803 HOPD COVID-19 SPEC COLLECT: HCPCS | Performed by: UROLOGY

## 2021-05-10 PROCEDURE — 36415 COLL VENOUS BLD VENIPUNCTURE: CPT

## 2021-05-10 PROCEDURE — 93010 ELECTROCARDIOGRAM REPORT: CPT | Performed by: INTERNAL MEDICINE

## 2021-05-10 PROCEDURE — 85027 COMPLETE CBC AUTOMATED: CPT

## 2021-05-10 PROCEDURE — 93005 ELECTROCARDIOGRAM TRACING: CPT

## 2021-05-10 PROCEDURE — 80048 BASIC METABOLIC PNL TOTAL CA: CPT

## 2021-05-11 LAB
QT INTERVAL: 386 MS
QTC INTERVAL: 464 MS

## 2021-05-12 ENCOUNTER — HOSPITAL ENCOUNTER (OUTPATIENT)
Dept: NON INVASIVE DIAGNOSTICS | Age: 56
Discharge: HOME | End: 2021-05-12
Payer: COMMERCIAL

## 2021-05-12 ENCOUNTER — HOSPITAL ENCOUNTER (OUTPATIENT)
Dept: NUCLEAR MEDICINE | Age: 56
Discharge: HOME | End: 2021-05-14
Payer: COMMERCIAL

## 2021-05-12 ENCOUNTER — TELEPHONE (OUTPATIENT)
Dept: CARDIOLOGY CLINIC | Age: 56
End: 2021-05-12

## 2021-05-12 DIAGNOSIS — R06.02 SOB (SHORTNESS OF BREATH): ICD-10-CM

## 2021-05-12 DIAGNOSIS — R07.9 CHEST PAIN, UNSPECIFIED TYPE: ICD-10-CM

## 2021-05-12 LAB
LV EF: 33 %
LV EF: 38 %
LVEF MODALITY: NORMAL
LVEF MODALITY: NORMAL

## 2021-05-12 PROCEDURE — 93306 TTE W/DOPPLER COMPLETE: CPT

## 2021-05-12 PROCEDURE — 78452 HT MUSCLE IMAGE SPECT MULT: CPT

## 2021-05-12 PROCEDURE — 3430000000 HC RX DIAGNOSTIC RADIOPHARMACEUTICAL: Performed by: INTERNAL MEDICINE

## 2021-05-12 PROCEDURE — 6360000002 HC RX W HCPCS: Performed by: INTERNAL MEDICINE

## 2021-05-12 PROCEDURE — A9500 TC99M SESTAMIBI: HCPCS | Performed by: INTERNAL MEDICINE

## 2021-05-12 RX ADMIN — REGADENOSON 0.4 MG: 0.08 INJECTION, SOLUTION INTRAVENOUS at 08:03

## 2021-05-12 RX ADMIN — TETRAKIS(2-METHOXYISOBUTYLISOCYANIDE)COPPER(I) TETRAFLUOROBORATE 10 MILLICURIE: 1 INJECTION, POWDER, LYOPHILIZED, FOR SOLUTION INTRAVENOUS at 14:57

## 2021-05-12 RX ADMIN — TETRAKIS(2-METHOXYISOBUTYLISOCYANIDE)COPPER(I) TETRAFLUOROBORATE 30 MILLICURIE: 1 INJECTION, POWDER, LYOPHILIZED, FOR SOLUTION INTRAVENOUS at 14:57

## 2021-05-13 ENCOUNTER — ANESTHESIA (OUTPATIENT)
Dept: PERIOP | Facility: HOSPITAL | Age: 56
End: 2021-05-13

## 2021-05-13 ENCOUNTER — HOSPITAL ENCOUNTER (OUTPATIENT)
Facility: HOSPITAL | Age: 56
Setting detail: HOSPITAL OUTPATIENT SURGERY
Discharge: HOME | End: 2021-05-13
Attending: UROLOGY | Admitting: UROLOGY

## 2021-05-13 ENCOUNTER — ANESTHESIA EVENT (OUTPATIENT)
Dept: PERIOP | Facility: HOSPITAL | Age: 56
End: 2021-05-13

## 2021-05-13 VITALS
TEMPERATURE: 97.9 F | DIASTOLIC BLOOD PRESSURE: 69 MMHG | HEART RATE: 74 BPM | RESPIRATION RATE: 16 BRPM | OXYGEN SATURATION: 99 % | SYSTOLIC BLOOD PRESSURE: 122 MMHG

## 2021-05-13 DIAGNOSIS — R97.20 ELEVATED PSA: ICD-10-CM

## 2021-05-13 PROCEDURE — 76942 ECHO GUIDE FOR BIOPSY: CPT | Performed by: UROLOGY

## 2021-05-13 PROCEDURE — 25010000002 FENTANYL CITRATE (PF) 250 MCG/5ML SOLUTION: Performed by: NURSE ANESTHETIST, CERTIFIED REGISTERED

## 2021-05-13 PROCEDURE — 25010000002 PROPOFOL 10 MG/ML EMULSION: Performed by: NURSE ANESTHETIST, CERTIFIED REGISTERED

## 2021-05-13 PROCEDURE — 55700 PR PROSTATE NEEDLE BIOPSY ANY APPROACH: CPT | Performed by: UROLOGY

## 2021-05-13 PROCEDURE — G0416 PROSTATE BIOPSY, ANY MTHD: HCPCS | Performed by: UROLOGY

## 2021-05-13 PROCEDURE — 25010000002 MIDAZOLAM PER 1 MG: Performed by: ANESTHESIOLOGY

## 2021-05-13 PROCEDURE — 25010000002 GENTAMICIN PER 80 MG: Performed by: UROLOGY

## 2021-05-13 RX ORDER — MIDAZOLAM HYDROCHLORIDE 1 MG/ML
1 INJECTION INTRAMUSCULAR; INTRAVENOUS
Status: DISCONTINUED | OUTPATIENT
Start: 2021-05-13 | End: 2021-05-13 | Stop reason: HOSPADM

## 2021-05-13 RX ORDER — SODIUM CHLORIDE 0.9 % (FLUSH) 0.9 %
3-10 SYRINGE (ML) INJECTION AS NEEDED
Status: DISCONTINUED | OUTPATIENT
Start: 2021-05-13 | End: 2021-05-13 | Stop reason: HOSPADM

## 2021-05-13 RX ORDER — FENTANYL CITRATE 50 UG/ML
INJECTION, SOLUTION INTRAMUSCULAR; INTRAVENOUS AS NEEDED
Status: DISCONTINUED | OUTPATIENT
Start: 2021-05-13 | End: 2021-05-13 | Stop reason: SURG

## 2021-05-13 RX ORDER — FENTANYL CITRATE 50 UG/ML
25 INJECTION, SOLUTION INTRAMUSCULAR; INTRAVENOUS
Status: DISCONTINUED | OUTPATIENT
Start: 2021-05-13 | End: 2021-05-13 | Stop reason: HOSPADM

## 2021-05-13 RX ORDER — SULFAMETHOXAZOLE AND TRIMETHOPRIM 800; 160 MG/1; MG/1
1 TABLET ORAL 2 TIMES DAILY
Qty: 6 TABLET | Refills: 0 | Status: SHIPPED | OUTPATIENT
Start: 2021-05-13 | End: 2021-05-16

## 2021-05-13 RX ORDER — SODIUM CHLORIDE, SODIUM LACTATE, POTASSIUM CHLORIDE, CALCIUM CHLORIDE 600; 310; 30; 20 MG/100ML; MG/100ML; MG/100ML; MG/100ML
1000 INJECTION, SOLUTION INTRAVENOUS CONTINUOUS
Status: DISCONTINUED | OUTPATIENT
Start: 2021-05-13 | End: 2021-05-13 | Stop reason: HOSPADM

## 2021-05-13 RX ORDER — PROPOFOL 10 MG/ML
VIAL (ML) INTRAVENOUS AS NEEDED
Status: DISCONTINUED | OUTPATIENT
Start: 2021-05-13 | End: 2021-05-13 | Stop reason: SURG

## 2021-05-13 RX ORDER — CLINDAMYCIN PHOSPHATE 900 MG/50ML
900 INJECTION INTRAVENOUS ONCE
Status: COMPLETED | OUTPATIENT
Start: 2021-05-13 | End: 2021-05-13

## 2021-05-13 RX ORDER — SODIUM CHLORIDE 9 MG/ML
100 INJECTION, SOLUTION INTRAVENOUS CONTINUOUS
Status: DISCONTINUED | OUTPATIENT
Start: 2021-05-13 | End: 2021-05-13 | Stop reason: HOSPADM

## 2021-05-13 RX ORDER — OXYCODONE AND ACETAMINOPHEN 10; 325 MG/1; MG/1
1 TABLET ORAL ONCE AS NEEDED
Status: DISCONTINUED | OUTPATIENT
Start: 2021-05-13 | End: 2021-05-13 | Stop reason: HOSPADM

## 2021-05-13 RX ORDER — HYDROCODONE BITARTRATE AND ACETAMINOPHEN 7.5; 325 MG/1; MG/1
1 TABLET ORAL ONCE AS NEEDED
Status: DISCONTINUED | OUTPATIENT
Start: 2021-05-13 | End: 2021-05-13 | Stop reason: HOSPADM

## 2021-05-13 RX ORDER — LABETALOL HYDROCHLORIDE 5 MG/ML
5 INJECTION, SOLUTION INTRAVENOUS
Status: DISCONTINUED | OUTPATIENT
Start: 2021-05-13 | End: 2021-05-13 | Stop reason: HOSPADM

## 2021-05-13 RX ORDER — SODIUM CHLORIDE 0.9 % (FLUSH) 0.9 %
3 SYRINGE (ML) INJECTION AS NEEDED
Status: DISCONTINUED | OUTPATIENT
Start: 2021-05-13 | End: 2021-05-13 | Stop reason: HOSPADM

## 2021-05-13 RX ORDER — FLUMAZENIL 0.1 MG/ML
0.2 INJECTION INTRAVENOUS AS NEEDED
Status: DISCONTINUED | OUTPATIENT
Start: 2021-05-13 | End: 2021-05-13 | Stop reason: HOSPADM

## 2021-05-13 RX ORDER — NALOXONE HCL 0.4 MG/ML
0.4 VIAL (ML) INJECTION AS NEEDED
Status: DISCONTINUED | OUTPATIENT
Start: 2021-05-13 | End: 2021-05-13 | Stop reason: HOSPADM

## 2021-05-13 RX ORDER — LIDOCAINE HYDROCHLORIDE 10 MG/ML
0.5 INJECTION, SOLUTION EPIDURAL; INFILTRATION; INTRACAUDAL; PERINEURAL ONCE AS NEEDED
Status: DISCONTINUED | OUTPATIENT
Start: 2021-05-13 | End: 2021-05-13 | Stop reason: HOSPADM

## 2021-05-13 RX ORDER — ONDANSETRON 2 MG/ML
4 INJECTION INTRAMUSCULAR; INTRAVENOUS ONCE AS NEEDED
Status: DISCONTINUED | OUTPATIENT
Start: 2021-05-13 | End: 2021-05-13 | Stop reason: HOSPADM

## 2021-05-13 RX ORDER — GENTAMICIN SULFATE 80 MG/100ML
80 INJECTION, SOLUTION INTRAVENOUS ONCE
Status: COMPLETED | OUTPATIENT
Start: 2021-05-13 | End: 2021-05-13

## 2021-05-13 RX ORDER — ACETAMINOPHEN 500 MG
1000 TABLET ORAL ONCE
Status: COMPLETED | OUTPATIENT
Start: 2021-05-13 | End: 2021-05-13

## 2021-05-13 RX ORDER — SODIUM CHLORIDE 0.9 % (FLUSH) 0.9 %
3 SYRINGE (ML) INJECTION EVERY 12 HOURS SCHEDULED
Status: DISCONTINUED | OUTPATIENT
Start: 2021-05-13 | End: 2021-05-13 | Stop reason: HOSPADM

## 2021-05-13 RX ORDER — SODIUM CHLORIDE, SODIUM LACTATE, POTASSIUM CHLORIDE, CALCIUM CHLORIDE 600; 310; 30; 20 MG/100ML; MG/100ML; MG/100ML; MG/100ML
100 INJECTION, SOLUTION INTRAVENOUS CONTINUOUS
Status: DISCONTINUED | OUTPATIENT
Start: 2021-05-13 | End: 2021-05-13 | Stop reason: HOSPADM

## 2021-05-13 RX ORDER — OXYCODONE AND ACETAMINOPHEN 7.5; 325 MG/1; MG/1
2 TABLET ORAL EVERY 4 HOURS PRN
Status: DISCONTINUED | OUTPATIENT
Start: 2021-05-13 | End: 2021-05-13 | Stop reason: HOSPADM

## 2021-05-13 RX ORDER — LIDOCAINE HYDROCHLORIDE 20 MG/ML
INJECTION, SOLUTION EPIDURAL; INFILTRATION; INTRACAUDAL; PERINEURAL AS NEEDED
Status: DISCONTINUED | OUTPATIENT
Start: 2021-05-13 | End: 2021-05-13 | Stop reason: SURG

## 2021-05-13 RX ADMIN — FENTANYL CITRATE 250 MCG: 50 INJECTION, SOLUTION INTRAMUSCULAR; INTRAVENOUS at 09:48

## 2021-05-13 RX ADMIN — GENTAMICIN SULFATE 80 MG: 80 INJECTION, SOLUTION INTRAVENOUS at 08:44

## 2021-05-13 RX ADMIN — ACETAMINOPHEN 1000 MG: 500 TABLET, FILM COATED ORAL at 08:39

## 2021-05-13 RX ADMIN — MIDAZOLAM 1 MG: 1 INJECTION INTRAMUSCULAR; INTRAVENOUS at 08:40

## 2021-05-13 RX ADMIN — SODIUM CHLORIDE, POTASSIUM CHLORIDE, SODIUM LACTATE AND CALCIUM CHLORIDE 1000 ML: 600; 310; 30; 20 INJECTION, SOLUTION INTRAVENOUS at 07:44

## 2021-05-13 RX ADMIN — LIDOCAINE HYDROCHLORIDE 100 MG: 20 INJECTION, SOLUTION EPIDURAL; INFILTRATION; INTRACAUDAL; PERINEURAL at 09:48

## 2021-05-13 RX ADMIN — PROPOFOL 200 MG: 10 INJECTION, EMULSION INTRAVENOUS at 09:48

## 2021-05-13 RX ADMIN — CLINDAMYCIN PHOSPHATE 900 MG: 900 INJECTION, SOLUTION INTRAVENOUS at 08:39

## 2021-05-13 NOTE — ANESTHESIA PROCEDURE NOTES
Airway  Urgency: elective    Date/Time: 5/13/2021 9:48 AM  Airway not difficult    General Information and Staff    Patient location during procedure: OR  CRNA: Dieter Brunson CRNA    Indications and Patient Condition  Indications for airway management: airway protection    Preoxygenated: yes  MILS maintained throughout  Mask difficulty assessment: 1 - vent by mask    Final Airway Details  Final airway type: supraglottic airway      Successful airway: Size 4    Number of attempts at approach: 1  Assessment: lips, teeth, and gum same as pre-op and atraumatic intubation

## 2021-05-13 NOTE — ANESTHESIA PREPROCEDURE EVALUATION
Anesthesia Evaluation     no history of anesthetic complications:  NPO Solid Status: > 6 hours  NPO Liquid Status: > 6 hours           Airway   Mallampati: II  Dental      Pulmonary    Cardiovascular   Exercise tolerance: good (4-7 METS)    (+) hypertension, dysrhythmias, angina, CHF , hyperlipidemia,       Neuro/Psych  (+) psychiatric history,     (-) seizures, CVA  GI/Hepatic/Renal/Endo    (+)  GERD,  renal disease,     Musculoskeletal     Abdominal    Substance History      OB/GYN          Other                        Anesthesia Plan    ASA 3     general   (Extensive discussion regarding patient's cardiac symptomatology and workup. Excellent understanding expressed by patient and strong desire to proceed despite possibly elevated risk. Risk of MI and death explicitly discussed and described, and patient accepts risk and wants to proceed. Discussed with Dr. Cota, and we both agree to accept and proceed today. )  intravenous induction     Anesthetic plan, all risks, benefits, and alternatives have been provided, discussed and informed consent has been obtained with: patient.

## 2021-05-13 NOTE — ANESTHESIA POSTPROCEDURE EVALUATION
Patient: Robert Piedra    Procedure Summary     Date: 05/13/21 Room / Location:  [facility] OR 09 /  [facility] OR    Anesthesia Start: 0946 Anesthesia Stop: 1019    Procedure: PROSTATE ULTRASOUND BIOPSY MRI FUSION WITH URONAV (N/A ) Diagnosis:       Elevated PSA      (Elevated PSA [R97.20])    Surgeons: Michele Cota MD Provider: Dieter Brunson CRNA    Anesthesia Type: general ASA Status: 3          Anesthesia Type: general    Vitals  No vitals data found for the desired time range.          Post Anesthesia Care and Evaluation    Patient location during evaluation: PACU  Patient participation: complete - patient participated  Level of consciousness: awake and alert  Pain management: adequate  Airway patency: patent  Anesthetic complications: No anesthetic complications    Cardiovascular status: acceptable  Respiratory status: acceptable  Hydration status: acceptable    Comments: Blood pressure 122/72, pulse 79, temperature 97.5 °F (36.4 °C), temperature source Temporal, resp. rate 18, SpO2 97 %.    Pt discharged from PACU based on quentin score >8

## 2021-05-15 LAB
CYTO UR: NORMAL
LAB AP CASE REPORT: NORMAL
PATH REPORT.FINAL DX SPEC: NORMAL
PATH REPORT.GROSS SPEC: NORMAL

## 2021-05-17 DIAGNOSIS — R97.20 ELEVATED PSA: Primary | ICD-10-CM

## 2021-05-21 ENCOUNTER — TELEPHONE (OUTPATIENT)
Dept: CARDIOLOGY CLINIC | Age: 56
End: 2021-05-21

## 2021-05-21 NOTE — TELEPHONE ENCOUNTER
Pt called stating he got dizzy while outside working in his garden. Pt advised to rest and make sure he is drinking plenty of fluids. Pt advised if he starts having SOA or symptoms get worse to report to ER. Pt verbally understood.

## 2021-05-24 ENCOUNTER — OFFICE VISIT (OUTPATIENT)
Age: 56
End: 2021-05-24

## 2021-05-24 VITALS — TEMPERATURE: 97 F | HEART RATE: 84 BPM | OXYGEN SATURATION: 98 %

## 2021-05-24 DIAGNOSIS — Z11.59 SCREENING FOR VIRAL DISEASE: Primary | ICD-10-CM

## 2021-05-24 LAB — SARS-COV-2, PCR: NOT DETECTED

## 2021-05-24 PROCEDURE — 99999 PR OFFICE/OUTPT VISIT,PROCEDURE ONLY: CPT | Performed by: NURSE PRACTITIONER

## 2021-05-27 ENCOUNTER — HOSPITAL ENCOUNTER (OUTPATIENT)
Dept: CARDIAC CATH/INVASIVE PROCEDURES | Age: 56
Discharge: HOME | End: 2021-05-27
Attending: INTERNAL MEDICINE | Admitting: INTERNAL MEDICINE
Payer: COMMERCIAL

## 2021-05-27 VITALS
BODY MASS INDEX: 27.16 KG/M2 | WEIGHT: 230 LBS | SYSTOLIC BLOOD PRESSURE: 116 MMHG | TEMPERATURE: 97.2 F | RESPIRATION RATE: 18 BRPM | OXYGEN SATURATION: 98 % | HEIGHT: 77 IN | HEART RATE: 78 BPM | DIASTOLIC BLOOD PRESSURE: 45 MMHG

## 2021-05-27 PROBLEM — I25.110 CORONARY ARTERY DISEASE INVOLVING NATIVE CORONARY ARTERY OF NATIVE HEART WITH UNSTABLE ANGINA PECTORIS (HCC): Status: ACTIVE | Noted: 2021-05-27

## 2021-05-27 PROBLEM — I25.5 ISCHEMIC CARDIOMYOPATHY: Status: ACTIVE | Noted: 2021-05-27

## 2021-05-27 LAB
ANION GAP SERPL CALCULATED.3IONS-SCNC: 7 MMOL/L (ref 7–19)
BUN BLDV-MCNC: 15 MG/DL (ref 6–20)
CALCIUM SERPL-MCNC: 9.3 MG/DL (ref 8.6–10)
CHLORIDE BLD-SCNC: 107 MMOL/L (ref 98–111)
CO2: 28 MMOL/L (ref 22–29)
CREAT SERPL-MCNC: 0.9 MG/DL (ref 0.5–1.2)
GFR AFRICAN AMERICAN: >59
GFR NON-AFRICAN AMERICAN: >60
GLUCOSE BLD-MCNC: 93 MG/DL (ref 74–109)
HCT VFR BLD CALC: 40 % (ref 42–52)
HEMOGLOBIN: 13.7 G/DL (ref 14–18)
MCH RBC QN AUTO: 30.7 PG (ref 27–31)
MCHC RBC AUTO-ENTMCNC: 34.3 G/DL (ref 33–37)
MCV RBC AUTO: 89.7 FL (ref 80–94)
PDW BLD-RTO: 11.8 % (ref 11.5–14.5)
PLATELET # BLD: 236 K/UL (ref 130–400)
PMV BLD AUTO: 10.8 FL (ref 9.4–12.4)
POTASSIUM SERPL-SCNC: 4.5 MMOL/L (ref 3.5–5)
RBC # BLD: 4.46 M/UL (ref 4.7–6.1)
SODIUM BLD-SCNC: 142 MMOL/L (ref 136–145)
WBC # BLD: 5.6 K/UL (ref 4.8–10.8)

## 2021-05-27 PROCEDURE — C1894 INTRO/SHEATH, NON-LASER: HCPCS

## 2021-05-27 PROCEDURE — 80048 BASIC METABOLIC PNL TOTAL CA: CPT

## 2021-05-27 PROCEDURE — 92928 PRQ TCAT PLMT NTRAC ST 1 LES: CPT

## 2021-05-27 PROCEDURE — 93454 CORONARY ARTERY ANGIO S&I: CPT

## 2021-05-27 PROCEDURE — 6360000002 HC RX W HCPCS

## 2021-05-27 PROCEDURE — 2580000003 HC RX 258: Performed by: INTERNAL MEDICINE

## 2021-05-27 PROCEDURE — C1874 STENT, COATED/COV W/DEL SYS: HCPCS

## 2021-05-27 PROCEDURE — 6360000004 HC RX CONTRAST MEDICATION: Performed by: INTERNAL MEDICINE

## 2021-05-27 PROCEDURE — C1887 CATHETER, GUIDING: HCPCS

## 2021-05-27 PROCEDURE — 2709999900 HC NON-CHARGEABLE SUPPLY

## 2021-05-27 PROCEDURE — 99153 MOD SED SAME PHYS/QHP EA: CPT

## 2021-05-27 PROCEDURE — 2500000003 HC RX 250 WO HCPCS

## 2021-05-27 PROCEDURE — 93005 ELECTROCARDIOGRAM TRACING: CPT | Performed by: INTERNAL MEDICINE

## 2021-05-27 PROCEDURE — 6370000000 HC RX 637 (ALT 250 FOR IP): Performed by: INTERNAL MEDICINE

## 2021-05-27 PROCEDURE — 36415 COLL VENOUS BLD VENIPUNCTURE: CPT

## 2021-05-27 PROCEDURE — 93454 CORONARY ARTERY ANGIO S&I: CPT | Performed by: INTERNAL MEDICINE

## 2021-05-27 PROCEDURE — 99152 MOD SED SAME PHYS/QHP 5/>YRS: CPT

## 2021-05-27 PROCEDURE — 99152 MOD SED SAME PHYS/QHP 5/>YRS: CPT | Performed by: INTERNAL MEDICINE

## 2021-05-27 PROCEDURE — 92928 PRQ TCAT PLMT NTRAC ST 1 LES: CPT | Performed by: INTERNAL MEDICINE

## 2021-05-27 PROCEDURE — C1769 GUIDE WIRE: HCPCS

## 2021-05-27 PROCEDURE — 6370000000 HC RX 637 (ALT 250 FOR IP)

## 2021-05-27 PROCEDURE — 85027 COMPLETE CBC AUTOMATED: CPT

## 2021-05-27 RX ORDER — SODIUM CHLORIDE 9 MG/ML
INJECTION, SOLUTION INTRAVENOUS CONTINUOUS
Status: DISCONTINUED | OUTPATIENT
Start: 2021-05-27 | End: 2021-05-27 | Stop reason: HOSPADM

## 2021-05-27 RX ORDER — SODIUM CHLORIDE 0.9 % (FLUSH) 0.9 %
5-40 SYRINGE (ML) INJECTION PRN
Status: DISCONTINUED | OUTPATIENT
Start: 2021-05-27 | End: 2021-05-27 | Stop reason: HOSPADM

## 2021-05-27 RX ORDER — SODIUM CHLORIDE 9 MG/ML
25 INJECTION, SOLUTION INTRAVENOUS PRN
Status: DISCONTINUED | OUTPATIENT
Start: 2021-05-27 | End: 2021-05-27 | Stop reason: HOSPADM

## 2021-05-27 RX ORDER — ACETAMINOPHEN 325 MG/1
650 TABLET ORAL EVERY 4 HOURS PRN
Status: DISCONTINUED | OUTPATIENT
Start: 2021-05-27 | End: 2021-05-27 | Stop reason: HOSPADM

## 2021-05-27 RX ORDER — ASPIRIN 81 MG/1
81 TABLET, CHEWABLE ORAL ONCE
Status: COMPLETED | OUTPATIENT
Start: 2021-05-27 | End: 2021-05-27

## 2021-05-27 RX ORDER — ONDANSETRON 2 MG/ML
4 INJECTION INTRAMUSCULAR; INTRAVENOUS EVERY 6 HOURS PRN
Status: DISCONTINUED | OUTPATIENT
Start: 2021-05-27 | End: 2021-05-27 | Stop reason: HOSPADM

## 2021-05-27 RX ORDER — SODIUM CHLORIDE 0.9 % (FLUSH) 0.9 %
5-40 SYRINGE (ML) INJECTION EVERY 12 HOURS SCHEDULED
Status: DISCONTINUED | OUTPATIENT
Start: 2021-05-27 | End: 2021-05-27 | Stop reason: HOSPADM

## 2021-05-27 RX ORDER — CLOPIDOGREL BISULFATE 75 MG/1
75 TABLET ORAL DAILY
Qty: 90 TABLET | Refills: 1 | Status: SHIPPED | OUTPATIENT
Start: 2021-05-27 | End: 2021-11-24

## 2021-05-27 RX ORDER — NITROGLYCERIN 0.4 MG/1
0.4 TABLET SUBLINGUAL EVERY 5 MIN PRN
Status: DISCONTINUED | OUTPATIENT
Start: 2021-05-27 | End: 2021-05-27 | Stop reason: HOSPADM

## 2021-05-27 RX ADMIN — ASPIRIN 81 MG: 81 TABLET, CHEWABLE ORAL at 12:30

## 2021-05-27 RX ADMIN — IOPAMIDOL 190 ML: 612 INJECTION, SOLUTION INTRAVENOUS at 14:59

## 2021-05-27 RX ADMIN — SODIUM CHLORIDE: 9 INJECTION, SOLUTION INTRAVENOUS at 13:30

## 2021-05-27 NOTE — PROGRESS NOTES
Discharge instructions reviewed with patient and patient states understanding. Patient discharged from cath holding with all belongings and stent card.     Electronically signed by Florentino Sears RN on 5/27/2021 at 5:25 PM

## 2021-05-27 NOTE — H&P
[facility], [address], [address] [facility]  Phone: [phone]  Fax: [phone]    OFFICE VISIT:  2021    Nicanor Aguilar - : 1965    I appreciate the opportunity of participating in the care of Nicanor Aguilar. He is a very pleasant 61 y.o. male who I had the opportunity of seeing in my office today, 21. Records from your office have been obtained and reviewed. Reason For Visit:  Drew Inman is a 61 y.o. male who is here for No chief complaint on file. Drew Inman is known to have a history of severe MR, hypertension, hyperlipidemia, GERD atrial fibrillation and BPH. He underwent previous ablation then had recurrent atrial fibrillation. He had congestive heart failure symptoms while in atrial fibrillation. On 2020, the patient underwent a Mini MVr, Ligation L Atrial Appendage, and MAZE with Dr. Brisa Anan. The intraoperatiave AYANNA reveals: LVEF 40-45% with mild RV dysfunction  He was placed on Eliquis 5 mg twice daily because his appendage closure was not complete. Patient was released from CT surgery 2020  Is following DALI Whitt at [facility].   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)      Past Medical History:   Diagnosis Date    Atrial fibrillation ([facility] [address]     CAD (coronary artery disease)     Hyperlipidemia     Hypertension     LV dysfunction     Mitral regurgitation      Past Surgical History: Procedure Laterality Date    APPENDECTOMY      ATRIAL ABLATION SURGERY  11/2019    CARDIOVERSION  10/2019    MITRAL VALVE REPAIR  08/19/2020    Mini MVR, ligation of left atrial appendage and [facility]    PROSTATE BIOPSY  05/2021    NEGATIVE     Family History   Problem Relation Age of Onset    Heart Attack Mother     Heart Surgery Mother     Heart Surgery Brother      Social History     Tobacco Use    Smoking status: Former Smoker     Quit date: 10/22/2005     Years since quitting: 15.6    Smokeless tobacco: Former User   Substance Use Topics    Alcohol use: Yes     Comment: socially        Allergies: Penicillins    Current Facility-Administered Medications   Medication Dose Route Frequency Provider Last Rate Last Admin    0.9 % sodium chloride infusion   Intravenous Continuous Paresh Mcdermott MD        sodium chloride flush 0.9 % injection 5-40 mL  5-40 mL Intravenous 2 times per day Paresh Mcdermott MD        sodium chloride flush 0.9 % injection 5-40 mL  5-40 mL Intravenous PRN Paresh Mcdermott MD        0.9 % sodium chloride infusion  25 mL Intravenous PRN Paresh Mcdermott MD        ondansetron (ZOFRAN) injection 4 mg  4 mg Intravenous Q6H PRN Paresh Mcdermott MD        nitroGLYCERIN (NITROSTAT) SL tablet 0.4 mg  0.4 mg Sublingual Q5 Min PRN Paresh Mcdermott MD        aspirin chewable tablet 81 mg  81 mg Oral Once Paresh Mcdermott MD           Review of Systems  Constitutional - no significant activity change, appetite change, or unexpected weight change. No fever, chills or diaphoresis. No fatigue. HEENT - no significant rhinorrhea or epistaxis. No tinnitus or significant hearing loss. Eyes - no sudden vision change or amaurosis. Respiratory - no significant wheezing, stridor, apnea or cough. No dyspnea on exertion or shortness of breath. Cardiovascular - no exertional chest pain, orthopnea or PND.   No sensation of arrhythmia or slow heart rate.   No claudication or leg edema. Gastrointestinal - no abdominal swelling or pain. No blood in stool. No severe constipation, diarrhea, nausea, or vomiting. Genitourinary - no difficulty urinating, dysuria, frequency, or urgency. No flank pain or hematuria. No  previous radiation or chemotherapy  Musculoskeletal - no back pain, gait disturbance, or myalgia. Skin - no color change or rash. No pallor. No new surgical incision. Neurologic - no speech difficulty, facial asymmetry or lateralizing weakness. No seizures, presyncope, syncope, or significant dizziness. Hematologic - no easy bruising or excessive bleeding. Psychiatric - no severe anxiety or insomnia. No confusion. All other review of systems are negative. Objective  Vital Signs - /66   Pulse 80 Comment: NSR  Temp 97.2 °F (36.2 °C) (Temporal)   Resp 17   Ht 6' 5\" (1.956 m)   Wt 230 lb (104.3 kg)   SpO2 100%   BMI 27.27 kg/m²   General - Jelani Kramer is alert, cooperative, and pleasant. Well groomed. No acute distress. Body habitus is normal.  HEENT - The head is normocephalic. No circumoral cyanosis. Dentition is normal.   Ears and nose externally normal. No abnormal scars or lesions noted  EYES -  No Xanthelasma, no arcus senilis, no conjunctival hemorrhages or discharge. Neck - Supple, without increased jugular venous pressures. No carotid bruits. No mass. Respiratory - Lungs are clear bilaterally. No wheezes or rales. Normal effort without use of accessory muscles. No tactile fremitus on palpation  Cardiovascular - Heart has irregular rhythm and rate. No murmurs, rubs or gallops. + pedal pulses and no varicosities. Abdominal -  Soft, nontender, nondistended. Bowel sounds are intact. Extremities - No clubbing, cyanosis, or  edema. Musculoskeletal - No musculoskeletal symptoms. No clubbing . No Osler's nodes. Gait normal .  No kyphosis or scoliosis.    Skin -  no statis ulcers or dermatitis. Neurological - No focal signs are identified. Oriented to person, place and time. Psychiatric -  Appropriate affect and mood. Assessment:          Diagnosis Orders   1. Chest pain, unspecified type  NM MYOCARDIAL SPECT REST EXERCISE OR RX    Echo 2D w doppler w color complete   2. SOB (shortness of breath)  NM MYOCARDIAL SPECT REST EXERCISE OR RX    Echo 2D w doppler w color complete       Because of the new symptoms including chest pain and shortness of breath, I would like to evaluate this further given the fact he has history of coronary artery disease but no history of PCI from prior catheterization done in [place] with about 50% LAD lesion    We will get 2D echo and nuclear stress test, her follow-up with me after 3 weeks    Blood pressure and heart rate controlled. Medical management includes  Beta-blocker, ARB, statin and remains anticoagulated on Eliquis      I appreciate the opportunity of participating in the care and treatment of this patient.   ----------------------------    Risks, benefits, alternatives of Heart cath/PCI discussed with the patient   I explained the procedure to the patient in detail and fully informed consent obtained. Acceptable Mallampati score  Consent for moderate conscious sedation  ASA 3      Kenia Vogel MD, [facility] [place], [place] [address] Cardiologist, Endovascular Specialist   Medical Director, Structural Heart Program   [facility]    EMR dragon/transcription disclaimer: Much of this encounter note is electronic transcription/translation of spoken language to printed tach. Electronic translation of spoken language may be erroneous, or at times, nonsensical words or phrases may be inadvertently transcribed.  Although, I have reviewed the note for such errors, some may still exist.

## 2021-05-28 LAB
EKG P AXIS: 65 DEGREES
EKG P-R INTERVAL: 182 MS
EKG Q-T INTERVAL: 434 MS
EKG QRS DURATION: 136 MS
EKG QTC CALCULATION (BAZETT): 451 MS
EKG T AXIS: 54 DEGREES

## 2021-05-28 PROCEDURE — 93010 ELECTROCARDIOGRAM REPORT: CPT | Performed by: INTERNAL MEDICINE

## 2021-06-01 DIAGNOSIS — R07.9 CHEST PAIN, UNSPECIFIED TYPE: ICD-10-CM

## 2021-06-01 DIAGNOSIS — R07.9 CHEST PAIN, UNSPECIFIED TYPE: Primary | ICD-10-CM

## 2021-06-01 LAB
ANION GAP SERPL CALCULATED.3IONS-SCNC: 9 MMOL/L (ref 7–19)
BUN BLDV-MCNC: 18 MG/DL (ref 6–20)
CALCIUM SERPL-MCNC: 9.3 MG/DL (ref 8.6–10)
CHLORIDE BLD-SCNC: 105 MMOL/L (ref 98–111)
CO2: 25 MMOL/L (ref 22–29)
CREAT SERPL-MCNC: 0.9 MG/DL (ref 0.5–1.2)
GFR AFRICAN AMERICAN: >59
GFR NON-AFRICAN AMERICAN: >60
GLUCOSE BLD-MCNC: 98 MG/DL (ref 74–109)
POTASSIUM SERPL-SCNC: 4.8 MMOL/L (ref 3.5–5)
SODIUM BLD-SCNC: 139 MMOL/L (ref 136–145)

## 2021-06-04 NOTE — MANAGEMENT PLAN
Management plan:  --------------------  Patient status post PCI and stents to RCA  Patient has history of recurrent atrial fibrillation status post mini MVR atrial ligation and maze procedure and will start Eliquis 5 mg twice daily after the ablation procedure  We will add Plavix to his medication for the stents  No aspirin given the high risk of bleeding      Jose Carranza MD, [facility] [place], [address] [facility], Endovascular Specialist   Medical Director, Structural Heart Program   [facility]

## 2021-06-07 ENCOUNTER — TELEPHONE (OUTPATIENT)
Dept: CARDIOLOGY CLINIC | Age: 56
End: 2021-06-07

## 2021-06-07 NOTE — TELEPHONE ENCOUNTER
Patient returned call. Advised to decrease losartan to 25 mg in the morning and to monitor BP/symptoms and call back on Friday. He voiced understanding. Med list updated.

## 2021-06-07 NOTE — TELEPHONE ENCOUNTER
Patient called stating after he had his heart cath with Dr. Rama Whittaker he started feeling lethargic and slow. He has been monitoring BP. He states that when he started checking BP It was around 98/49. All medications on chart are correct and he is taking them. He denies any swelling. BP the past few days started around 130/75 and at the end of the day 100/48. Another reading was 114/58 and it dropped to 97/55. He is having dizziness when standing up and ambulating.

## 2021-06-07 NOTE — TELEPHONE ENCOUNTER
Chio Gaffney, APRN - CNP  You 7 minutes ago (9:07 AM)   SW  Lets discontinue night dose of losartan. 25 mg once daily. Continue BP log and call Friday with update on symptoms and blood pressure readings.

## 2021-06-10 ENCOUNTER — HOSPITAL ENCOUNTER (OUTPATIENT)
Dept: CARDIAC REHAB | Age: 56
Setting detail: THERAPIES SERIES
Discharge: HOME | End: 2021-06-10
Payer: COMMERCIAL

## 2021-06-11 NOTE — TELEPHONE ENCOUNTER
Patient called this morning with BP readings. His readings have improved to 111/58, 120/63, 115/60, 126/70. He states he is feeling better. He starts cardiac rehab soon. Advised to let us know if pressures drop or he feels worse.

## 2021-06-16 ENCOUNTER — HOSPITAL ENCOUNTER (OUTPATIENT)
Dept: CARDIAC REHAB | Age: 56
Setting detail: THERAPIES SERIES
Discharge: HOME | End: 2021-06-16
Payer: COMMERCIAL

## 2021-06-16 PROCEDURE — 93798 PHYS/QHP OP CAR RHAB W/ECG: CPT

## 2021-06-18 ENCOUNTER — HOSPITAL ENCOUNTER (OUTPATIENT)
Dept: CARDIAC REHAB | Age: 56
Setting detail: THERAPIES SERIES
Discharge: HOME | End: 2021-06-18
Payer: COMMERCIAL

## 2021-06-18 PROCEDURE — 93798 PHYS/QHP OP CAR RHAB W/ECG: CPT

## 2021-06-21 ENCOUNTER — HOSPITAL ENCOUNTER (OUTPATIENT)
Dept: CARDIAC REHAB | Age: 56
Setting detail: THERAPIES SERIES
Discharge: HOME | End: 2021-06-21
Payer: COMMERCIAL

## 2021-06-21 PROCEDURE — 93798 PHYS/QHP OP CAR RHAB W/ECG: CPT

## 2021-06-23 ENCOUNTER — HOSPITAL ENCOUNTER (OUTPATIENT)
Dept: CARDIAC REHAB | Age: 56
Setting detail: THERAPIES SERIES
Discharge: HOME | End: 2021-06-23
Payer: COMMERCIAL

## 2021-06-23 PROCEDURE — 93798 PHYS/QHP OP CAR RHAB W/ECG: CPT

## 2021-06-25 ENCOUNTER — HOSPITAL ENCOUNTER (OUTPATIENT)
Dept: CARDIAC REHAB | Age: 56
Setting detail: THERAPIES SERIES
Discharge: HOME | End: 2021-06-25
Payer: COMMERCIAL

## 2021-06-25 PROCEDURE — 93798 PHYS/QHP OP CAR RHAB W/ECG: CPT

## 2021-06-28 ENCOUNTER — HOSPITAL ENCOUNTER (OUTPATIENT)
Dept: CARDIAC REHAB | Age: 56
Setting detail: THERAPIES SERIES
Discharge: HOME | End: 2021-06-28
Payer: COMMERCIAL

## 2021-06-28 PROCEDURE — 93798 PHYS/QHP OP CAR RHAB W/ECG: CPT

## 2021-06-29 ENCOUNTER — OFFICE VISIT (OUTPATIENT)
Dept: CARDIOLOGY CLINIC | Age: 56
End: 2021-06-29
Payer: COMMERCIAL

## 2021-06-29 VITALS
HEIGHT: 77 IN | WEIGHT: 230 LBS | HEART RATE: 105 BPM | DIASTOLIC BLOOD PRESSURE: 68 MMHG | BODY MASS INDEX: 27.16 KG/M2 | SYSTOLIC BLOOD PRESSURE: 112 MMHG

## 2021-06-29 DIAGNOSIS — R06.02 SOB (SHORTNESS OF BREATH): Primary | ICD-10-CM

## 2021-06-29 PROCEDURE — 99214 OFFICE O/P EST MOD 30 MIN: CPT | Performed by: INTERNAL MEDICINE

## 2021-06-29 PROCEDURE — 1111F DSCHRG MED/CURRENT MED MERGE: CPT | Performed by: INTERNAL MEDICINE

## 2021-06-29 RX ORDER — LOSARTAN POTASSIUM 25 MG/1
25 TABLET ORAL DAILY
COMMUNITY
End: 2021-11-23

## 2021-07-02 ENCOUNTER — HOSPITAL ENCOUNTER (OUTPATIENT)
Dept: CARDIAC REHAB | Age: 56
Setting detail: THERAPIES SERIES
Discharge: HOME | End: 2021-07-02
Payer: COMMERCIAL

## 2021-07-02 PROCEDURE — 93798 PHYS/QHP OP CAR RHAB W/ECG: CPT

## 2021-07-05 ENCOUNTER — APPOINTMENT (OUTPATIENT)
Dept: CARDIAC REHAB | Age: 56
End: 2021-07-05
Payer: COMMERCIAL

## 2021-07-07 ENCOUNTER — HOSPITAL ENCOUNTER (OUTPATIENT)
Dept: CARDIAC REHAB | Age: 56
Setting detail: THERAPIES SERIES
Discharge: HOME | End: 2021-07-07
Payer: COMMERCIAL

## 2021-07-07 PROCEDURE — 93798 PHYS/QHP OP CAR RHAB W/ECG: CPT

## 2021-07-09 ENCOUNTER — HOSPITAL ENCOUNTER (OUTPATIENT)
Dept: CARDIAC REHAB | Age: 56
Setting detail: THERAPIES SERIES
Discharge: HOME | End: 2021-07-09
Payer: COMMERCIAL

## 2021-07-09 PROCEDURE — 93798 PHYS/QHP OP CAR RHAB W/ECG: CPT

## 2021-07-12 ENCOUNTER — HOSPITAL ENCOUNTER (OUTPATIENT)
Dept: CARDIAC REHAB | Age: 56
Setting detail: THERAPIES SERIES
Discharge: HOME | End: 2021-07-12
Payer: COMMERCIAL

## 2021-07-12 PROCEDURE — 93798 PHYS/QHP OP CAR RHAB W/ECG: CPT

## 2021-07-12 NOTE — PROGRESS NOTES
[facility], [address], [address] [facility]  Phone: [phone]  Fax: [phone]    OFFICE VISIT:  2021    Cary Low - : 1965    I appreciate the opportunity of participating in the care of Cary Low. He is a very pleasant 61 y.o. male who I had the opportunity of seeing in my office today, Records from your office have been obtained and reviewed. Reason For Visit:  Cande Greer is a 61 y.o. male who is here for Follow-Up from Hospital (feels pretty good except minor chest discomfort at times)      Cande Greer is known to have a history of severe MR, hypertension, hyperlipidemia, GERD atrial fibrillation and BPH. He underwent previous ablation then had recurrent atrial fibrillation. He had congestive heart failure symptoms while in atrial fibrillation. On 2020, the patient underwent a Mini MVr, Ligation L Atrial Appendage, and MAZE with Dr. Son Higgins. The intraoperatiave AYANNA reveals: LVEF 40-45% with mild RV dysfunction  He was placed on Eliquis 5 mg twice daily because his appendage closure was not complete. Patient was released from CT surgery 2020  Is following DALI Asher at [address] [place] [address].   He currently has a loop recorder in    He underwent recent cardiac evaluation in May 2021 after he presented with chest pain, he underwent 2D echo which showed LV systolic function of 05% and no significant mitral regurgitation across the annuloplasty ring, stress test which was abnormal, based on that he underwent heart catheterization with me on May 27, 2021, he underwent successful PCI with stenting of his RCA    He is here for follow-up visit after the procedure, he tells me he is doing well no severe chest pain no ER visits, he reports some none typical chest discomfort, he reports no worsening shortness of breath or leg swelling or palpitation or syncope        Cary Low has the following history as recorded in [facility]:  Patient Active Problem List    Diagnosis Date Noted    Mitral regurgitation     Hypertension     [facility]     Coronary artery disease involving native coronary artery of native heart with unstable angina pectoris ([facility] 05/27/2021    Ischemic cardiomyopathy 05/27/2021     Past Medical History:   Diagnosis Date    [facility] fibrillation [facility]     CAD (coronary artery disease)     Hyperlipidemia     Hypertension     LV dysfunction     Mitral regurgitation      Past Surgical History:   Procedure Laterality Date    APPENDECTOMY      ATRIAL ABLATION SURGERY  11/2019    CARDIOVERSION  10/2019    INSERTABLE CARDIAC MONITOR  2019    loop recorder    MITRAL VALVE REPAIR  08/19/2020    Mini MVR, ligation of left atrial appendage and maze procedure-[place]    PROSTATE BIOPSY  05/2021    NEGATIVE     Family History   Problem Relation Age of Onset    Heart Attack Mother     Heart Surgery Mother     Heart Surgery Brother      Social History     Tobacco Use    Smoking status: Former Smoker     Quit date: 10/22/2005     Years since quitting: 15.7    Smokeless tobacco: Former User   Substance Use Topics    Alcohol use: Yes     Comment: socially        Allergies: Penicillins    Current Outpatient Medications   Medication Sig Dispense Refill    losartan (COZAAR) 25 MG tablet Take 25 mg by mouth daily      clopidogrel (PLAVIX) 75 MG tablet Take 1 tablet by mouth daily 90 tablet 1    furosemide (LASIX) 40 MG tablet Take 1 tablet by mouth daily 30 tablet 5    metoprolol tartrate (LOPRESSOR) 25 MG tablet Take 1 tablet by mouth 2 times daily 60 tablet 5    potassium chloride (KLOR-CON M) 20 MEQ TBCR extended release tablet Take 1 tablet by mouth daily 30 tablet 5    apixaban (ELIQUIS) 5 MG TABS tablet Take 1 tablet by mouth 2 times daily 60 tablet 5    atorvastatin (LIPITOR) 20 MG tablet Take 20 mg by mouth daily      tamsulosin (FLOMAX) 0.4 MG capsule Take 1 capsule by mouth daily      Coenzyme Q10 300 MG CAPS Take 300 mg by mouth daily       No current facility-administered medications for this visit. Review of Systems  Constitutional  no significant activity change, appetite change, or unexpected weight change. No fever, chills or diaphoresis. No fatigue. HEENT  no significant rhinorrhea or epistaxis. No tinnitus or significant hearing loss. Eyes  no sudden vision change or amaurosis. Respiratory  no significant wheezing, stridor, apnea or cough. No dyspnea on exertion or shortness of breath. Cardiovascular  no exertional chest pain, orthopnea or PND. No sensation of arrhythmia or slow heart rate. No claudication or leg edema. Gastrointestinal  no abdominal swelling or pain. No blood in stool. No severe constipation, diarrhea, nausea, or vomiting. Genitourinary  no difficulty urinating, dysuria, frequency, or urgency. No flank pain or hematuria. No  previous radiation or chemotherapy  Musculoskeletal  no back pain, gait disturbance, or myalgia. Skin  no color change or rash. No pallor. No new surgical incision. Neurologic  no speech difficulty, facial asymmetry or lateralizing weakness. No seizures, presyncope, syncope, or significant dizziness. Hematologic  no easy bruising or excessive bleeding. Psychiatric  no severe anxiety or insomnia. No confusion. All other review of systems are negative. Objective  Vital Signs - /68   Pulse 105   Ht 6' 5\" (1.956 m)   Wt 230 lb (104.3 kg)   BMI 27.27 kg/m²   General - Mar Serna is alert, cooperative, and pleasant. Well groomed. No acute distress. Body habitus is normal.  HEENT  The head is normocephalic. No circumoral cyanosis. Dentition is normal.   Ears and nose externally normal. No abnormal scars or lesions noted  EYES -  No Xanthelasma, no arcus senilis, no conjunctival hemorrhages or discharge. Neck - Supple, without increased jugular venous pressures. No carotid bruits. No mass.    Respiratory - Lungs stress imaging. The patient was discharged in stable condition. Results: Patient had symptoms of dyspnea during infusion that resolved   in recovery. Baseline EKG showed normal sinus rhythm, CAD. During   stress there were no significant EKG changes or rhythm changes,   negative for ischemia. Baseline and peak blood pressures were 108/60,   and 108/60 respectively.  Baseline and peak heart rates were 82 and    93 respectively. The resting and stress perfusion images were compared. There is a   fixed moderate size and moderate intensity defect of the inferior   wall, very little reversibility, considered positive for scar with a   matching wall motion abnormality as noted below. Gated wall motion demonstrates inferior wall akinesis to severe   hypokinesis, although wall segments contract normally, ejection   fraction reduced at 33%. Conclusions   This is an abnormal pharmacologic nuclear stress test, positive for   inferior wall scar, basically no ischemia with inferior wall   hypokinesis to akinesis and severely reduced ejection fraction   overall. Signed by Dr Joe Arce on 5/12/2021 3:32 PM        Procedure-heart catheterization     Procedure Type      Diagnostic procedure:DCA, Coronary Angiogram      PCI procedure:Right Coronary, RCA, PTCA, GARRET      Conclusions      Successful PCI / Drug Eluting Stent of the proximal Right Coronary Artery. Successful PCI / Drug Eluting Stent of the distal Right Coronary Artery. Patient tolerated the procedure well. Recommendations      Medical management. Dual antiplatelet agents for one year. Aggressive risk factor management.       Signatures      ----------------------------------------------------------------   Electronically signed by Dariana Samayoa MD(Performing   Physician) on 06/03/2021 16:14   ----------------------------------------------------------------        Assessment:         Coronary artery disease -status post recent heart catheterization and PCI to the RCA with 3 stents -stable from cardiovascular standpoint with no worsening angina, no shortness of breath or leg swelling or palpitation or syncope  We will continue with current medical management    We will continue with Plavix and Eliquis, no aspirin to reduce his risk of bleeding      Blood pressure and heart rate controlled. Medical management includes  Beta-blocker, ARB, statin and remains anticoagulated on Eliquis    Follow-up in the office after 6 months with an echo to assess LV function    I appreciate the opportunity of participating in the care and treatment of this patient. Jordyn Ty MD, [facility], [facility]  Interventional Cardiologist, Endovascular Specialist   Medical Director, Structural Heart Program   [facility]    EMR dragon/transcription disclaimer: Much of this encounter note is electronic transcription/translation of spoken language to printed tach. Electronic translation of spoken language may be erroneous, or at times, nonsensical words or phrases may be inadvertently transcribed.  Although, I have reviewed the note for such errors, some may still exist.      Cc:  Charmel Boast, MD

## 2021-07-14 ENCOUNTER — HOSPITAL ENCOUNTER (OUTPATIENT)
Dept: CARDIAC REHAB | Age: 56
Setting detail: THERAPIES SERIES
Discharge: HOME | End: 2021-07-14
Payer: COMMERCIAL

## 2021-07-14 PROCEDURE — 93798 PHYS/QHP OP CAR RHAB W/ECG: CPT

## 2021-07-16 ENCOUNTER — HOSPITAL ENCOUNTER (OUTPATIENT)
Dept: CARDIAC REHAB | Age: 56
Setting detail: THERAPIES SERIES
Discharge: HOME | End: 2021-07-16
Payer: COMMERCIAL

## 2021-07-16 PROCEDURE — 93798 PHYS/QHP OP CAR RHAB W/ECG: CPT

## 2021-07-19 ENCOUNTER — HOSPITAL ENCOUNTER (OUTPATIENT)
Dept: CARDIAC REHAB | Age: 56
Setting detail: THERAPIES SERIES
Discharge: HOME | End: 2021-07-19
Payer: COMMERCIAL

## 2021-07-19 ENCOUNTER — TELEPHONE (OUTPATIENT)
Dept: CARDIOLOGY CLINIC | Age: 56
End: 2021-07-19

## 2021-07-19 PROCEDURE — 93798 PHYS/QHP OP CAR RHAB W/ECG: CPT

## 2021-07-19 NOTE — TELEPHONE ENCOUNTER
Pt called stating his BP is low   90/31  98/47  94/54  92/48  Can't function with bp this low, dizzy, no energy. Pls advise.      Med list is correct

## 2021-07-21 ENCOUNTER — HOSPITAL ENCOUNTER (OUTPATIENT)
Dept: CARDIAC REHAB | Age: 56
Setting detail: THERAPIES SERIES
Discharge: HOME | End: 2021-07-21
Payer: COMMERCIAL

## 2021-07-21 PROCEDURE — 93798 PHYS/QHP OP CAR RHAB W/ECG: CPT

## 2021-07-21 NOTE — TELEPHONE ENCOUNTER
Call patient and let him hold losartan for now, cut back to half on Lasix and metoprolol  Follow-up with us over the phone in the next few days with blood pressure and heart rate measurements to see if there is any improvement in his symptoms    -AA

## 2021-07-23 ENCOUNTER — HOSPITAL ENCOUNTER (OUTPATIENT)
Dept: CARDIAC REHAB | Age: 56
Setting detail: THERAPIES SERIES
Discharge: HOME | End: 2021-07-23
Payer: COMMERCIAL

## 2021-07-23 PROCEDURE — 93798 PHYS/QHP OP CAR RHAB W/ECG: CPT

## 2021-07-26 ENCOUNTER — HOSPITAL ENCOUNTER (OUTPATIENT)
Dept: CARDIAC REHAB | Age: 56
Setting detail: THERAPIES SERIES
Discharge: HOME | End: 2021-07-26
Payer: COMMERCIAL

## 2021-07-26 ENCOUNTER — TELEPHONE (OUTPATIENT)
Dept: CARDIOLOGY CLINIC | Age: 56
End: 2021-07-26

## 2021-07-26 PROCEDURE — 93798 PHYS/QHP OP CAR RHAB W/ECG: CPT

## 2021-07-26 NOTE — TELEPHONE ENCOUNTER
Brenton Samaniego came into the office saying he wasn't feeling well after his med change, I went back to talk to Aubrie Campbell. When I came back to tell him patient had left, called patient back and left a message to tell him we would be glad to make him an appointment.  If pt calls back please make appoinment will Chio Campbell

## 2021-07-27 ENCOUNTER — OFFICE VISIT (OUTPATIENT)
Dept: FAMILY MEDICINE CLINIC | Facility: CLINIC | Age: 56
End: 2021-07-27

## 2021-07-27 VITALS
WEIGHT: 235 LBS | BODY MASS INDEX: 28.62 KG/M2 | HEIGHT: 76 IN | DIASTOLIC BLOOD PRESSURE: 68 MMHG | HEART RATE: 70 BPM | SYSTOLIC BLOOD PRESSURE: 130 MMHG | RESPIRATION RATE: 16 BRPM | TEMPERATURE: 98.5 F

## 2021-07-27 DIAGNOSIS — R50.9 FEVER, UNSPECIFIED FEVER CAUSE: Primary | ICD-10-CM

## 2021-07-27 PROCEDURE — 99213 OFFICE O/P EST LOW 20 MIN: CPT | Performed by: NURSE PRACTITIONER
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
## 2021-07-28 ENCOUNTER — PATIENT MESSAGE (OUTPATIENT)
Dept: CARDIOLOGY CLINIC | Age: 56
End: 2021-07-28

## 2021-07-28 ENCOUNTER — HOSPITAL ENCOUNTER (OUTPATIENT)
Dept: CARDIAC REHAB | Age: 56
Setting detail: THERAPIES SERIES
Discharge: HOME | End: 2021-07-28
Payer: COMMERCIAL

## 2021-07-28 PROCEDURE — 93798 PHYS/QHP OP CAR RHAB W/ECG: CPT

## 2021-07-28 NOTE — TELEPHONE ENCOUNTER
From: Vikki Russian  To: Kirt Nogueira MD  Sent: 7/28/2021 11:58 AM CDT  Subject: Non-Urgent Medical Question    i'm sending my vitals as recorded after the last med change.

## 2021-07-29 ENCOUNTER — CLINICAL SUPPORT (OUTPATIENT)
Dept: FAMILY MEDICINE CLINIC | Facility: CLINIC | Age: 56
End: 2021-07-29

## 2021-07-29 VITALS — OXYGEN SATURATION: 98 % | HEART RATE: 102 BPM | TEMPERATURE: 99.1 F

## 2021-07-29 DIAGNOSIS — R50.9 FEVER, UNSPECIFIED FEVER CAUSE: Primary | ICD-10-CM

## 2021-07-29 PROCEDURE — 99211 OFF/OP EST MAY X REQ PHY/QHP: CPT | Performed by: NURSE PRACTITIONER

## 2021-07-29 NOTE — PROGRESS NOTES
Patient at drive up for Covid 19 swab, done per oropharynx as ordered, patient tolerated well, specimen secured and placed in freezer with order as directed.     Ordered an order, and stopped an order, thought was duplicate, or old order and put back in w/B Falk APRN provider

## 2021-07-30 LAB
LABCORP SARS-COV-2, NAA 2 DAY TAT: NORMAL
SARS-COV-2 RNA RESP QL NAA+PROBE: NOT DETECTED

## 2021-07-31 LAB
APPEARANCE UR: CLEAR
BACTERIA #/AREA URNS HPF: ABNORMAL /HPF
BACTERIA UR CULT: ABNORMAL
BACTERIA UR CULT: ABNORMAL
BILIRUB UR QL STRIP: NEGATIVE
CASTS URNS QL MICRO: ABNORMAL /LPF
COLOR UR: YELLOW
EPI CELLS #/AREA URNS HPF: ABNORMAL /HPF (ref 0–10)
GLUCOSE UR QL: NEGATIVE
HGB UR QL STRIP: ABNORMAL
KETONES UR QL STRIP: NEGATIVE
LEUKOCYTE ESTERASE UR QL STRIP: ABNORMAL
MICRO URNS: ABNORMAL
NITRITE UR QL STRIP: NEGATIVE
OTHER ANTIBIOTIC SUSC ISLT: ABNORMAL
PH UR STRIP: 6 [PH] (ref 5–7.5)
PROT UR QL STRIP: NEGATIVE
RBC #/AREA URNS HPF: ABNORMAL /HPF (ref 0–2)
SP GR UR: 1.01 (ref 1–1.03)
URINALYSIS REFLEX: ABNORMAL
UROBILINOGEN UR STRIP-MCNC: 0.2 MG/DL (ref 0.2–1)
WBC #/AREA URNS HPF: >30 /HPF (ref 0–5)

## 2021-08-02 ENCOUNTER — TELEPHONE (OUTPATIENT)
Dept: FAMILY MEDICINE CLINIC | Facility: CLINIC | Age: 56
End: 2021-08-02

## 2021-08-02 DIAGNOSIS — I10 ESSENTIAL HYPERTENSION: ICD-10-CM

## 2021-08-02 DIAGNOSIS — I10 ESSENTIAL HYPERTENSION: Primary | ICD-10-CM

## 2021-08-02 LAB
ANION GAP SERPL CALCULATED.3IONS-SCNC: 14 MMOL/L (ref 7–19)
BUN BLDV-MCNC: 13 MG/DL (ref 6–20)
CALCIUM SERPL-MCNC: 9.5 MG/DL (ref 8.6–10)
CHLORIDE BLD-SCNC: 96 MMOL/L (ref 98–111)
CO2: 26 MMOL/L (ref 22–29)
CREAT SERPL-MCNC: 1 MG/DL (ref 0.5–1.2)
GFR AFRICAN AMERICAN: >59
GFR NON-AFRICAN AMERICAN: >60
GLUCOSE BLD-MCNC: 126 MG/DL (ref 74–109)
POTASSIUM SERPL-SCNC: 4.4 MMOL/L (ref 3.5–5)
SODIUM BLD-SCNC: 136 MMOL/L (ref 136–145)

## 2021-08-02 RX ORDER — NITROFURANTOIN 25; 75 MG/1; MG/1
100 CAPSULE ORAL 2 TIMES DAILY
Qty: 14 CAPSULE | Refills: 0 | Status: SHIPPED | OUTPATIENT
Start: 2021-08-02 | End: 2021-08-02 | Stop reason: SDUPTHER

## 2021-08-02 RX ORDER — NITROFURANTOIN 25; 75 MG/1; MG/1
100 CAPSULE ORAL 2 TIMES DAILY
Qty: 14 CAPSULE | Refills: 0 | Status: SHIPPED | OUTPATIENT
Start: 2021-08-02 | End: 2021-08-09

## 2021-08-02 NOTE — TELEPHONE ENCOUNTER
Patient is currently awaiting covid test results from PCP. He has had fever since last Monday. States his only symptoms are slight headache and he does have muscle strains throughout his body that at random. Muscle strains started at right calf and then radiated to lower back for a few days, some to his left thigh that feel like he has pulled a hamstring. He says that muscle strains started around the time his medications were changed. He is tensing body with fever/chill symptoms and having sweats at night. His lasix has been reduced to 20 mg QAM and metoprolol 25 mg QAM. BP this AM before taking any medications was 91/47 109 and he checked again while we were on the phone and it was 104/60 103 HR. He wants to know if he needs to reduce potassium dose as well. He currently takes 20 meq daily. He states he does have fluctuations with potassium on labs, do you want to check BMP? Did assess muscle cramps and statin use but he has been taking atorvastatin for years and takes COQ10 with it and does not have cramps. I told him to hold the lasix and metoprolol this AM until I get further direction from you since BP is low this morning.

## 2021-08-02 NOTE — TELEPHONE ENCOUNTER
Patient notified to hold lasix this AM and re-check BP in 1 hour and if systolic is over 422 ok to take metoprolol. He also got his covid results back and they were negative. What would you like him to do with potassium? He currently takes 20 meq QD.

## 2021-08-02 NOTE — TELEPHONE ENCOUNTER
Caller: Robert Piedra    Relationship: Self    Best call back number:[phone]    Caller requesting test results: PATIENT    What test was performed: COVID TEST    When was the test performed: LAST WEEK

## 2021-08-03 ENCOUNTER — TELEPHONE (OUTPATIENT)
Dept: FAMILY MEDICINE CLINIC | Facility: CLINIC | Age: 56
End: 2021-08-03

## 2021-08-03 DIAGNOSIS — R07.89 ATYPICAL CHEST PAIN: Primary | ICD-10-CM

## 2021-08-03 NOTE — TELEPHONE ENCOUNTER
Patient called and stated that he would like to speak to nurse regarding UTI Medication. Please call patient.    Callback: [phone]   stretcher

## 2021-08-03 NOTE — TELEPHONE ENCOUNTER
Pt said that he is having some sharp pains in his left side on/off he did start on the anbtx last night he wants to know if this is expected with uti but he still has fever and has had it for 9 days now

## 2021-08-03 NOTE — TELEPHONE ENCOUNTER
Chest xray ordered for atypical chest pain left under ribs.  Patient reports no cough, no SOA, covid is neg.  On macrobid for UTI.    Electronically signed by LUDWIG Cahndra, 08/03/21, 3:52 PM CDT.

## 2021-08-05 ENCOUNTER — TELEPHONE (OUTPATIENT)
Dept: FAMILY MEDICINE CLINIC | Facility: CLINIC | Age: 56
End: 2021-08-05

## 2021-08-05 DIAGNOSIS — R07.89 ATYPICAL CHEST PAIN: ICD-10-CM

## 2021-08-05 RX ORDER — LEVOFLOXACIN 500 MG/1
500 TABLET, FILM COATED ORAL DAILY
Qty: 10 TABLET | Refills: 0 | Status: SHIPPED | OUTPATIENT
Start: 2021-08-05 | End: 2021-08-05

## 2021-08-05 RX ORDER — LEVOFLOXACIN 500 MG/1
500 TABLET, FILM COATED ORAL DAILY
Qty: 10 TABLET | Refills: 0 | Status: ON HOLD | OUTPATIENT
Start: 2021-08-05 | End: 2021-08-13

## 2021-08-05 NOTE — TELEPHONE ENCOUNTER
Caller: Robert Piedra    Relationship: Self    Best call back number: [phone] (H)    Caller requesting test results: CHEST X RAY RESULTS     What test was performed: X RAY     When was the test performed: 08/04/21    Where was the test performed: [facility]     Additional notes: REQUESTING CALL BACK FOR CHEST X RAY AND COVID RESULTS

## 2021-08-05 NOTE — TELEPHONE ENCOUNTER
Levaquin 500 mg PO daily - should cover any residual respiratory infection.  If worse over weekend - ED or can f/u on Monday his choice depending on how he feels, he is to come in follow-up.    Electronically signed by LUDWIG Chandra, 08/05/21, 9:54 AM CDT.

## 2021-08-05 NOTE — PROGRESS NOTES
Please call the patient - chest xray clear    Electronically signed by LUDWIG Chandra, 08/05/21, 9:11 AM CDT.

## 2021-08-05 NOTE — TELEPHONE ENCOUNTER
Pt stated that he still can not get a full breath and hurts to breath and he is still running fevers

## 2021-08-06 ENCOUNTER — HOSPITAL ENCOUNTER (EMERGENCY)
Facility: HOSPITAL | Age: 56
Discharge: HOME | End: 2021-08-06
Attending: EMERGENCY MEDICINE | Admitting: EMERGENCY MEDICINE

## 2021-08-06 ENCOUNTER — TELEPHONE (OUTPATIENT)
Dept: FAMILY MEDICINE CLINIC | Facility: CLINIC | Age: 56
End: 2021-08-06

## 2021-08-06 ENCOUNTER — APPOINTMENT (OUTPATIENT)
Dept: ULTRASOUND IMAGING | Facility: HOSPITAL | Age: 56
End: 2021-08-06

## 2021-08-06 VITALS
HEART RATE: 87 BPM | WEIGHT: 216 LBS | TEMPERATURE: 98.3 F | SYSTOLIC BLOOD PRESSURE: 120 MMHG | OXYGEN SATURATION: 97 % | RESPIRATION RATE: 20 BRPM | HEIGHT: 77 IN | BODY MASS INDEX: 25.5 KG/M2 | DIASTOLIC BLOOD PRESSURE: 67 MMHG

## 2021-08-06 DIAGNOSIS — I82.4Z1 ACUTE DEEP VEIN THROMBOSIS (DVT) OF DISTAL END OF RIGHT LOWER EXTREMITY (HCC): Primary | ICD-10-CM

## 2021-08-06 DIAGNOSIS — S39.012A STRAIN OF LUMBAR REGION, INITIAL ENCOUNTER: ICD-10-CM

## 2021-08-06 LAB
ANION GAP SERPL CALCULATED.3IONS-SCNC: 10 MMOL/L (ref 5–15)
BACTERIA UR QL AUTO: ABNORMAL /HPF
BASOPHILS # BLD AUTO: 0.01 10*3/MM3 (ref 0–0.2)
BASOPHILS NFR BLD AUTO: 0.1 % (ref 0–1.5)
BILIRUB UR QL STRIP: NEGATIVE
BUN SERPL-MCNC: 11 MG/DL (ref 6–20)
BUN/CREAT SERPL: 12.9 (ref 7–25)
CALCIUM SPEC-SCNC: 9.5 MG/DL (ref 8.6–10.5)
CHLORIDE SERPL-SCNC: 97 MMOL/L (ref 98–107)
CLARITY UR: CLEAR
CO2 SERPL-SCNC: 27 MMOL/L (ref 22–29)
COLOR UR: ABNORMAL
CREAT SERPL-MCNC: 0.85 MG/DL (ref 0.76–1.27)
DEPRECATED RDW RBC AUTO: 38.8 FL (ref 37–54)
EOSINOPHIL # BLD AUTO: 0.11 10*3/MM3 (ref 0–0.4)
EOSINOPHIL NFR BLD AUTO: 1 % (ref 0.3–6.2)
ERYTHROCYTE [DISTWIDTH] IN BLOOD BY AUTOMATED COUNT: 12 % (ref 12.3–15.4)
GFR SERPL CREATININE-BSD FRML MDRD: 93 ML/MIN/1.73
GLUCOSE SERPL-MCNC: 106 MG/DL (ref 65–99)
GLUCOSE UR STRIP-MCNC: NEGATIVE MG/DL
HCT VFR BLD AUTO: 38.6 % (ref 37.5–51)
HGB BLD-MCNC: 12.6 G/DL (ref 13–17.7)
HGB UR QL STRIP.AUTO: ABNORMAL
HOLD SPECIMEN: NORMAL
HYALINE CASTS UR QL AUTO: ABNORMAL /LPF
IMM GRANULOCYTES # BLD AUTO: 0.05 10*3/MM3 (ref 0–0.05)
IMM GRANULOCYTES NFR BLD AUTO: 0.5 % (ref 0–0.5)
KETONES UR QL STRIP: NEGATIVE
LEUKOCYTE ESTERASE UR QL STRIP.AUTO: NEGATIVE
LYMPHOCYTES # BLD AUTO: 1.04 10*3/MM3 (ref 0.7–3.1)
LYMPHOCYTES NFR BLD AUTO: 9.5 % (ref 19.6–45.3)
MCH RBC QN AUTO: 28.4 PG (ref 26.6–33)
MCHC RBC AUTO-ENTMCNC: 32.6 G/DL (ref 31.5–35.7)
MCV RBC AUTO: 87.1 FL (ref 79–97)
MONOCYTES # BLD AUTO: 0.56 10*3/MM3 (ref 0.1–0.9)
MONOCYTES NFR BLD AUTO: 5.1 % (ref 5–12)
NEUTROPHILS NFR BLD AUTO: 83.8 % (ref 42.7–76)
NEUTROPHILS NFR BLD AUTO: 9.17 10*3/MM3 (ref 1.7–7)
NITRITE UR QL STRIP: NEGATIVE
NRBC BLD AUTO-RTO: 0 /100 WBC (ref 0–0.2)
PH UR STRIP.AUTO: <=5 [PH] (ref 5–8)
PLATELET # BLD AUTO: 418 10*3/MM3 (ref 140–450)
PMV BLD AUTO: 10.1 FL (ref 6–12)
POTASSIUM SERPL-SCNC: 4.4 MMOL/L (ref 3.5–5.2)
PROT UR QL STRIP: NEGATIVE
RBC # BLD AUTO: 4.43 10*6/MM3 (ref 4.14–5.8)
RBC # UR: ABNORMAL /HPF
REF LAB TEST METHOD: ABNORMAL
SODIUM SERPL-SCNC: 134 MMOL/L (ref 136–145)
SP GR UR STRIP: 1.02 (ref 1–1.03)
SQUAMOUS #/AREA URNS HPF: ABNORMAL /HPF
UROBILINOGEN UR QL STRIP: ABNORMAL
WBC # BLD AUTO: 10.94 10*3/MM3 (ref 3.4–10.8)
WBC UR QL AUTO: ABNORMAL /HPF
WHOLE BLOOD HOLD SPECIMEN: NORMAL

## 2021-08-06 PROCEDURE — 99283 EMERGENCY DEPT VISIT LOW MDM: CPT

## 2021-08-06 PROCEDURE — 93971 EXTREMITY STUDY: CPT | Performed by: SURGERY

## 2021-08-06 PROCEDURE — 93971 EXTREMITY STUDY: CPT

## 2021-08-06 PROCEDURE — 80048 BASIC METABOLIC PNL TOTAL CA: CPT | Performed by: EMERGENCY MEDICINE

## 2021-08-06 PROCEDURE — 81001 URINALYSIS AUTO W/SCOPE: CPT | Performed by: EMERGENCY MEDICINE

## 2021-08-06 PROCEDURE — 85025 COMPLETE CBC W/AUTO DIFF WBC: CPT | Performed by: EMERGENCY MEDICINE

## 2021-08-06 PROCEDURE — 25010000002 KETOROLAC TROMETHAMINE PER 15 MG: Performed by: EMERGENCY MEDICINE

## 2021-08-06 PROCEDURE — 96374 THER/PROPH/DIAG INJ IV PUSH: CPT

## 2021-08-06 RX ORDER — KETOROLAC TROMETHAMINE 15 MG/ML
15 INJECTION, SOLUTION INTRAMUSCULAR; INTRAVENOUS ONCE
Status: COMPLETED | OUTPATIENT
Start: 2021-08-06 | End: 2021-08-06

## 2021-08-06 RX ORDER — CYCLOBENZAPRINE HCL 5 MG
5 TABLET ORAL 3 TIMES DAILY PRN
Qty: 15 TABLET | Refills: 0 | Status: SHIPPED | OUTPATIENT
Start: 2021-08-06 | End: 2021-08-20 | Stop reason: HOSPADM

## 2021-08-06 RX ORDER — HYDROCODONE BITARTRATE AND ACETAMINOPHEN 5; 325 MG/1; MG/1
1 TABLET ORAL EVERY 6 HOURS PRN
Qty: 12 TABLET | Refills: 0 | Status: ON HOLD | OUTPATIENT
Start: 2021-08-06 | End: 2021-08-13

## 2021-08-06 RX ORDER — SODIUM CHLORIDE 0.9 % (FLUSH) 0.9 %
10 SYRINGE (ML) INJECTION AS NEEDED
Status: DISCONTINUED | OUTPATIENT
Start: 2021-08-06 | End: 2021-08-06 | Stop reason: HOSPADM

## 2021-08-06 RX ADMIN — KETOROLAC TROMETHAMINE 15 MG: 15 INJECTION, SOLUTION INTRAMUSCULAR; INTRAVENOUS at 16:41
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
## 2021-08-06 NOTE — TELEPHONE ENCOUNTER
Caller: Robert Piedra    Relationship to patient: Self    Best call back number: [phone]     Patient is needing: PATIENT HAD AN APPT ON 07/27 FOR FEVER AND UPSET STOMACH. PATIENT STATES THAT HE HAS NOT GOTTEN ANY BETTER. STATES HE STILL IS HAVING OFF AND ON FEVER. LAST TIME HE WAS TOLD TO GO TO HOSPITAL IF HE IS NOT DOING ANY BETTER, PATIENT WOULD JUST LIKE SOME CARNIFICATION ON WHAT SHOULD BE DONE.

## 2021-08-06 NOTE — TELEPHONE ENCOUNTER
I think a visit to the ED would be most appropriate at this point since now having GI symptoms.  Advise very difficulty to work up with advanced imaging (CT) if needed with insurance as outpatient  Since this is persistent, I believe [facility] is the next step.

## 2021-08-09 ENCOUNTER — OFFICE VISIT (OUTPATIENT)
Dept: FAMILY MEDICINE CLINIC | Facility: CLINIC | Age: 56
End: 2021-08-09

## 2021-08-09 VITALS
OXYGEN SATURATION: 97 % | HEART RATE: 86 BPM | HEIGHT: 77 IN | DIASTOLIC BLOOD PRESSURE: 82 MMHG | WEIGHT: 216 LBS | BODY MASS INDEX: 25.5 KG/M2 | SYSTOLIC BLOOD PRESSURE: 120 MMHG | RESPIRATION RATE: 16 BRPM

## 2021-08-09 DIAGNOSIS — I82.461 ACUTE DEEP VEIN THROMBOSIS (DVT) OF CALF MUSCLE VEIN OF RIGHT LOWER EXTREMITY (HCC): ICD-10-CM

## 2021-08-09 DIAGNOSIS — Z79.01 ANTICOAGULATED: ICD-10-CM
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
## 2021-08-09 PROCEDURE — 99213 OFFICE O/P EST LOW 20 MIN: CPT | Performed by: NURSE PRACTITIONER

## 2021-08-09 RX ORDER — DOXYCYCLINE HYCLATE 100 MG/1
100 CAPSULE ORAL 2 TIMES DAILY
Qty: 20 CAPSULE | Refills: 0 | Status: SHIPPED | OUTPATIENT
Start: 2021-08-09 | End: 2021-08-19
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
## 2021-08-10 ENCOUNTER — APPOINTMENT (OUTPATIENT)
Dept: CT IMAGING | Facility: HOSPITAL | Age: 56
End: 2021-08-10

## 2021-08-10 ENCOUNTER — HOSPITAL ENCOUNTER (INPATIENT)
Facility: HOSPITAL | Age: 56
LOS: 10 days | Discharge: HOME | End: 2021-08-20
Attending: FAMILY MEDICINE | Admitting: FAMILY MEDICINE

## 2021-08-10 ENCOUNTER — APPOINTMENT (OUTPATIENT)
Dept: GENERAL RADIOLOGY | Facility: HOSPITAL | Age: 56
End: 2021-08-10

## 2021-08-10 DIAGNOSIS — R77.8 ELEVATED TROPONIN: Primary | ICD-10-CM

## 2021-08-10 DIAGNOSIS — D73.89 SPLENIC LESION: ICD-10-CM

## 2021-08-10 DIAGNOSIS — K76.9 LIVER LESION: ICD-10-CM

## 2021-08-10 DIAGNOSIS — Z78.9 DECREASED ACTIVITIES OF DAILY LIVING (ADL): ICD-10-CM

## 2021-08-10 DIAGNOSIS — R41.0 CONFUSION: ICD-10-CM

## 2021-08-10 PROBLEM — R79.89 ELEVATED TROPONIN: Status: ACTIVE | Noted: 2021-08-10

## 2021-08-10 LAB
ALBUMIN SERPL-MCNC: 3.3 G/DL (ref 3.5–5.2)
ALBUMIN/GLOB SERPL: 0.8 G/DL
ALP SERPL-CCNC: 88 U/L (ref 39–117)
ALT SERPL W P-5'-P-CCNC: 14 U/L (ref 1–41)
AMMONIA BLD-SCNC: 14 UMOL/L (ref 16–60)
AMPHET+METHAMPHET UR QL: NEGATIVE
AMPHETAMINES UR QL: NEGATIVE
ANION GAP SERPL CALCULATED.3IONS-SCNC: 10 MMOL/L (ref 5–15)
APTT PPP: 49.9 SECONDS (ref 24.1–35)
ARTERIAL PATENCY WRIST A: POSITIVE
AST SERPL-CCNC: 22 U/L (ref 1–40)
ATMOSPHERIC PRESS: 753 MMHG
BACTERIA UR QL AUTO: ABNORMAL /HPF
BARBITURATES UR QL SCN: NEGATIVE
BASE EXCESS BLDA CALC-SCNC: 2.6 MMOL/L (ref 0–2)
BASOPHILS # BLD AUTO: 0.01 10*3/MM3 (ref 0–0.2)
BASOPHILS NFR BLD AUTO: 0.1 % (ref 0–1.5)
BDY SITE: ABNORMAL
BENZODIAZ UR QL SCN: NEGATIVE
BILIRUB SERPL-MCNC: 0.4 MG/DL (ref 0–1.2)
BILIRUB UR QL STRIP: NEGATIVE
BODY TEMPERATURE: 37 C
BUN SERPL-MCNC: 11 MG/DL (ref 6–20)
BUN/CREAT SERPL: 13.1 (ref 7–25)
BUPRENORPHINE SERPL-MCNC: NEGATIVE NG/ML
CALCIUM SPEC-SCNC: 9.1 MG/DL (ref 8.6–10.5)
CANNABINOIDS SERPL QL: NEGATIVE
CHLORIDE SERPL-SCNC: 100 MMOL/L (ref 98–107)
CLARITY UR: CLEAR
CO2 SERPL-SCNC: 25 MMOL/L (ref 22–29)
COCAINE UR QL: NEGATIVE
COLOR UR: YELLOW
CREAT SERPL-MCNC: 0.84 MG/DL (ref 0.76–1.27)
DEPRECATED RDW RBC AUTO: 37.8 FL (ref 37–54)
EOSINOPHIL # BLD AUTO: 0.1 10*3/MM3 (ref 0–0.4)
EOSINOPHIL NFR BLD AUTO: 1.2 % (ref 0.3–6.2)
ERYTHROCYTE [DISTWIDTH] IN BLOOD BY AUTOMATED COUNT: 12 % (ref 12.3–15.4)
ETHANOL UR QL: <0.01 %
GFR SERPL CREATININE-BSD FRML MDRD: 94 ML/MIN/1.73
GLOBULIN UR ELPH-MCNC: 4.2 GM/DL
GLUCOSE SERPL-MCNC: 110 MG/DL (ref 65–99)
GLUCOSE UR STRIP-MCNC: NEGATIVE MG/DL
HCO3 BLDA-SCNC: 25.7 MMOL/L (ref 20–26)
HCT VFR BLD AUTO: 37.2 % (ref 37.5–51)
HGB BLD-MCNC: 12.2 G/DL (ref 13–17.7)
HGB UR QL STRIP.AUTO: ABNORMAL
HOLD SPECIMEN: NORMAL
HOLD SPECIMEN: NORMAL
HYALINE CASTS UR QL AUTO: ABNORMAL /LPF
IMM GRANULOCYTES # BLD AUTO: 0.04 10*3/MM3 (ref 0–0.05)
IMM GRANULOCYTES NFR BLD AUTO: 0.5 % (ref 0–0.5)
INR PPP: 1.54 (ref 0.91–1.09)
KETONES UR QL STRIP: NEGATIVE
LEUKOCYTE ESTERASE UR QL STRIP.AUTO: NEGATIVE
LIPASE SERPL-CCNC: 47 U/L (ref 13–60)
LYMPHOCYTES # BLD AUTO: 0.92 10*3/MM3 (ref 0.7–3.1)
LYMPHOCYTES NFR BLD AUTO: 10.8 % (ref 19.6–45.3)
Lab: ABNORMAL
MCH RBC QN AUTO: 28.2 PG (ref 26.6–33)
MCHC RBC AUTO-ENTMCNC: 32.8 G/DL (ref 31.5–35.7)
MCV RBC AUTO: 86.1 FL (ref 79–97)
METHADONE UR QL SCN: NEGATIVE
MODALITY: ABNORMAL
MONOCYTES # BLD AUTO: 0.64 10*3/MM3 (ref 0.1–0.9)
MONOCYTES NFR BLD AUTO: 7.5 % (ref 5–12)
NEUTROPHILS NFR BLD AUTO: 6.82 10*3/MM3 (ref 1.7–7)
NEUTROPHILS NFR BLD AUTO: 79.9 % (ref 42.7–76)
NITRITE UR QL STRIP: NEGATIVE
NRBC BLD AUTO-RTO: 0 /100 WBC (ref 0–0.2)
NT-PROBNP SERPL-MCNC: 746.5 PG/ML (ref 0–900)
OPIATES UR QL: POSITIVE
OXYCODONE UR QL SCN: NEGATIVE
PCO2 BLDA: 34 MM HG (ref 35–45)
PCO2 TEMP ADJ BLD: 34 MM HG (ref 35–45)
PCP UR QL SCN: NEGATIVE
PH BLDA: 7.49 PH UNITS (ref 7.35–7.45)
PH UR STRIP.AUTO: 5.5 [PH] (ref 5–8)
PH, TEMP CORRECTED: 7.49 PH UNITS (ref 7.35–7.45)
PLATELET # BLD AUTO: 364 10*3/MM3 (ref 140–450)
PMV BLD AUTO: 10.4 FL (ref 6–12)
PO2 BLDA: 70.8 MM HG (ref 83–108)
PO2 TEMP ADJ BLD: 70.8 MM HG (ref 83–108)
POTASSIUM SERPL-SCNC: 4.2 MMOL/L (ref 3.5–5.2)
PROPOXYPH UR QL: NEGATIVE
PROT SERPL-MCNC: 7.5 G/DL (ref 6–8.5)
PROT UR QL STRIP: NEGATIVE
PROTHROMBIN TIME: 17.3 SECONDS (ref 11.5–13.4)
RBC # BLD AUTO: 4.32 10*6/MM3 (ref 4.14–5.8)
RBC # UR: ABNORMAL /HPF
REF LAB TEST METHOD: ABNORMAL
SAO2 % BLDCOA: 95 % (ref 94–99)
SARS-COV-2 RNA PNL SPEC NAA+PROBE: NOT DETECTED
SODIUM SERPL-SCNC: 135 MMOL/L (ref 136–145)
SP GR UR STRIP: >1.03 (ref 1–1.03)
SQUAMOUS #/AREA URNS HPF: ABNORMAL /HPF
TRICYCLICS UR QL SCN: NEGATIVE
TROPONIN T SERPL-MCNC: 0.06 NG/ML (ref 0–0.03)
TROPONIN T SERPL-MCNC: 0.07 NG/ML (ref 0–0.03)
TROPONIN T SERPL-MCNC: 0.07 NG/ML (ref 0–0.03)
UROBILINOGEN UR QL STRIP: ABNORMAL
VENTILATOR MODE: ABNORMAL
WBC # BLD AUTO: 8.53 10*3/MM3 (ref 3.4–10.8)
WBC UR QL AUTO: ABNORMAL /HPF
WHOLE BLOOD HOLD SPECIMEN: NORMAL

## 2021-08-10 PROCEDURE — 82077 ASSAY SPEC XCP UR&BREATH IA: CPT | Performed by: NURSE PRACTITIONER

## 2021-08-10 PROCEDURE — 93010 ELECTROCARDIOGRAM REPORT: CPT | Performed by: INTERNAL MEDICINE

## 2021-08-10 PROCEDURE — 25010000002 ENOXAPARIN PER 10 MG: Performed by: INTERNAL MEDICINE

## 2021-08-10 PROCEDURE — 0 IOPAMIDOL PER 1 ML: Performed by: NURSE PRACTITIONER

## 2021-08-10 PROCEDURE — 85610 PROTHROMBIN TIME: CPT | Performed by: NURSE PRACTITIONER

## 2021-08-10 PROCEDURE — 99285 EMERGENCY DEPT VISIT HI MDM: CPT

## 2021-08-10 PROCEDURE — 99221 1ST HOSP IP/OBS SF/LOW 40: CPT | Performed by: FAMILY MEDICINE

## 2021-08-10 PROCEDURE — 93005 ELECTROCARDIOGRAM TRACING: CPT | Performed by: FAMILY MEDICINE

## 2021-08-10 PROCEDURE — 93005 ELECTROCARDIOGRAM TRACING: CPT

## 2021-08-10 PROCEDURE — 85730 THROMBOPLASTIN TIME PARTIAL: CPT | Performed by: NURSE PRACTITIONER

## 2021-08-10 PROCEDURE — 80306 DRUG TEST PRSMV INSTRMNT: CPT | Performed by: NURSE PRACTITIONER

## 2021-08-10 PROCEDURE — 82803 BLOOD GASES ANY COMBINATION: CPT

## 2021-08-10 PROCEDURE — 93005 ELECTROCARDIOGRAM TRACING: CPT | Performed by: NURSE PRACTITIONER

## 2021-08-10 PROCEDURE — 82140 ASSAY OF AMMONIA: CPT | Performed by: NURSE PRACTITIONER

## 2021-08-10 PROCEDURE — 87635 SARS-COV-2 COVID-19 AMP PRB: CPT | Performed by: NURSE PRACTITIONER

## 2021-08-10 PROCEDURE — 83036 HEMOGLOBIN GLYCOSYLATED A1C: CPT | Performed by: CLINICAL NURSE SPECIALIST

## 2021-08-10 PROCEDURE — 83880 ASSAY OF NATRIURETIC PEPTIDE: CPT | Performed by: NURSE PRACTITIONER

## 2021-08-10 PROCEDURE — 71275 CT ANGIOGRAPHY CHEST: CPT

## 2021-08-10 PROCEDURE — 85025 COMPLETE CBC W/AUTO DIFF WBC: CPT | Performed by: NURSE PRACTITIONER

## 2021-08-10 PROCEDURE — 36600 WITHDRAWAL OF ARTERIAL BLOOD: CPT

## 2021-08-10 PROCEDURE — 80053 COMPREHEN METABOLIC PANEL: CPT | Performed by: NURSE PRACTITIONER

## 2021-08-10 PROCEDURE — 82607 VITAMIN B-12: CPT | Performed by: CLINICAL NURSE SPECIALIST

## 2021-08-10 PROCEDURE — 71045 X-RAY EXAM CHEST 1 VIEW: CPT

## 2021-08-10 PROCEDURE — 74177 CT ABD & PELVIS W/CONTRAST: CPT

## 2021-08-10 PROCEDURE — 81001 URINALYSIS AUTO W/SCOPE: CPT | Performed by: NURSE PRACTITIONER

## 2021-08-10 PROCEDURE — 80061 LIPID PANEL: CPT | Performed by: CLINICAL NURSE SPECIALIST

## 2021-08-10 PROCEDURE — 70450 CT HEAD/BRAIN W/O DYE: CPT

## 2021-08-10 PROCEDURE — 84484 ASSAY OF TROPONIN QUANT: CPT | Performed by: NURSE PRACTITIONER

## 2021-08-10 PROCEDURE — 83690 ASSAY OF LIPASE: CPT | Performed by: NURSE PRACTITIONER

## 2021-08-10 PROCEDURE — 84484 ASSAY OF TROPONIN QUANT: CPT | Performed by: FAMILY MEDICINE

## 2021-08-10 PROCEDURE — 84443 ASSAY THYROID STIM HORMONE: CPT | Performed by: CLINICAL NURSE SPECIALIST

## 2021-08-10 RX ORDER — SODIUM CHLORIDE 0.9 % (FLUSH) 0.9 %
10 SYRINGE (ML) INJECTION EVERY 12 HOURS SCHEDULED
Status: DISCONTINUED | OUTPATIENT
Start: 2021-08-10 | End: 2021-08-21 | Stop reason: HOSPADM

## 2021-08-10 RX ORDER — CLOPIDOGREL BISULFATE 75 MG/1
75 TABLET ORAL DAILY
Status: DISCONTINUED | OUTPATIENT
Start: 2021-08-11 | End: 2021-08-21 | Stop reason: HOSPADM

## 2021-08-10 RX ORDER — CYCLOBENZAPRINE HCL 10 MG
5 TABLET ORAL 3 TIMES DAILY PRN
Status: DISCONTINUED | OUTPATIENT
Start: 2021-08-10 | End: 2021-08-21 | Stop reason: HOSPADM

## 2021-08-10 RX ORDER — HYDROCODONE BITARTRATE AND ACETAMINOPHEN 5; 325 MG/1; MG/1
1 TABLET ORAL EVERY 6 HOURS PRN
Status: DISCONTINUED | OUTPATIENT
Start: 2021-08-10 | End: 2021-08-21 | Stop reason: HOSPADM

## 2021-08-10 RX ORDER — TAMSULOSIN HYDROCHLORIDE 0.4 MG/1
0.4 CAPSULE ORAL DAILY
Status: DISCONTINUED | OUTPATIENT
Start: 2021-08-11 | End: 2021-08-21 | Stop reason: HOSPADM

## 2021-08-10 RX ORDER — SODIUM CHLORIDE 0.9 % (FLUSH) 0.9 %
10 SYRINGE (ML) INJECTION AS NEEDED
Status: DISCONTINUED | OUTPATIENT
Start: 2021-08-10 | End: 2021-08-16 | Stop reason: SDUPTHER

## 2021-08-10 RX ORDER — SODIUM CHLORIDE 0.9 % (FLUSH) 0.9 %
10 SYRINGE (ML) INJECTION AS NEEDED
Status: DISCONTINUED | OUTPATIENT
Start: 2021-08-10 | End: 2021-08-21 | Stop reason: HOSPADM

## 2021-08-10 RX ORDER — CLOPIDOGREL BISULFATE 75 MG/1
75 TABLET ORAL DAILY
COMMUNITY
End: 2021-09-17

## 2021-08-10 RX ORDER — ATORVASTATIN CALCIUM 10 MG/1
20 TABLET, FILM COATED ORAL DAILY
Status: DISCONTINUED | OUTPATIENT
Start: 2021-08-11 | End: 2021-08-11

## 2021-08-10 RX ADMIN — SODIUM CHLORIDE, PRESERVATIVE FREE 10 ML: 5 INJECTION INTRAVENOUS at 21:05

## 2021-08-10 RX ADMIN — ENOXAPARIN SODIUM 100 MG: 100 INJECTION SUBCUTANEOUS at 21:04

## 2021-08-10 RX ADMIN — IOPAMIDOL 100 ML: 755 INJECTION, SOLUTION INTRAVENOUS at 11:45
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
## 2021-08-10 NOTE — PLAN OF CARE
Goal Outcome Evaluation:  Plan of Care Reviewed With: patient        Progress: no change  Outcome Summary: Patient admitted from er after pt had episode of confusion at home. No c/o pain but trops are elevated. Neuro consult and cardio consult. NPO after midnight in case of testing.  Cont to monitor

## 2021-08-11 ENCOUNTER — APPOINTMENT (OUTPATIENT)
Dept: MRI IMAGING | Facility: HOSPITAL | Age: 56
End: 2021-08-11

## 2021-08-11 ENCOUNTER — TELEPHONE (OUTPATIENT)
Dept: CARDIOLOGY CLINIC | Age: 56
End: 2021-08-11

## 2021-08-11 PROBLEM — R20.0 NUMBNESS OF TONGUE: Status: ACTIVE | Noted: 2021-08-11

## 2021-08-11 PROBLEM — R41.0 CONFUSION: Status: ACTIVE | Noted: 2021-08-11

## 2021-08-11 LAB
CHOLEST SERPL-MCNC: 140 MG/DL (ref 0–200)
CRP SERPL-MCNC: 6.38 MG/DL (ref 0–0.5)
ERYTHROCYTE [SEDIMENTATION RATE] IN BLOOD: 26 MM/HR (ref 0–15)
HBA1C MFR BLD: 5.9 % (ref 4.8–5.6)
HCT VFR BLD AUTO: 36.7 % (ref 37.5–51)
HDLC SERPL-MCNC: 21 MG/DL (ref 40–60)
LDLC SERPL CALC-MCNC: 95 MG/DL (ref 0–100)
LDLC/HDLC SERPL: 4.43 {RATIO}
MAGNESIUM SERPL-MCNC: 2.1 MG/DL (ref 1.6–2.6)
PA ADP PRP-ACNC: 217 PRU (ref 194–418)
PLATELET # BLD AUTO: 383 10*3/MM3 (ref 140–450)
QT INTERVAL: 386 MS
QT INTERVAL: 388 MS
QT INTERVAL: 388 MS
QT INTERVAL: 390 MS
QT INTERVAL: 392 MS
QT INTERVAL: 392 MS
QTC INTERVAL: 455 MS
QTC INTERVAL: 458 MS
QTC INTERVAL: 469 MS
QTC INTERVAL: 474 MS
QTC INTERVAL: 479 MS
QTC INTERVAL: 495 MS
TRIGL SERPL-MCNC: 130 MG/DL (ref 0–150)
TSH SERPL DL<=0.05 MIU/L-ACNC: 0.81 UIU/ML (ref 0.27–4.2)
VIT B12 BLD-MCNC: 669 PG/ML (ref 211–946)
VLDLC SERPL-MCNC: 24 MG/DL (ref 5–40)

## 2021-08-11 PROCEDURE — 99255 IP/OBS CONSLTJ NEW/EST HI 80: CPT | Performed by: INTERNAL MEDICINE

## 2021-08-11 PROCEDURE — 86140 C-REACTIVE PROTEIN: CPT | Performed by: CLINICAL NURSE SPECIALIST

## 2021-08-11 PROCEDURE — 70553 MRI BRAIN STEM W/O & W/DYE: CPT

## 2021-08-11 PROCEDURE — 0 GADOBENATE DIMEGLUMINE 529 MG/ML SOLUTION: Performed by: FAMILY MEDICINE

## 2021-08-11 PROCEDURE — 25010000002 ENOXAPARIN PER 10 MG: Performed by: INTERNAL MEDICINE

## 2021-08-11 PROCEDURE — A9577 INJ MULTIHANCE: HCPCS | Performed by: FAMILY MEDICINE

## 2021-08-11 PROCEDURE — 83735 ASSAY OF MAGNESIUM: CPT | Performed by: CLINICAL NURSE SPECIALIST

## 2021-08-11 PROCEDURE — 93010 ELECTROCARDIOGRAM REPORT: CPT | Performed by: INTERNAL MEDICINE

## 2021-08-11 PROCEDURE — 99231 SBSQ HOSP IP/OBS SF/LOW 25: CPT | Performed by: FAMILY MEDICINE

## 2021-08-11 PROCEDURE — 85576 BLOOD PLATELET AGGREGATION: CPT | Performed by: CLINICAL NURSE SPECIALIST

## 2021-08-11 PROCEDURE — 93005 ELECTROCARDIOGRAM TRACING: CPT | Performed by: FAMILY MEDICINE

## 2021-08-11 PROCEDURE — 85651 RBC SED RATE NONAUTOMATED: CPT | Performed by: CLINICAL NURSE SPECIALIST

## 2021-08-11 PROCEDURE — 99223 1ST HOSP IP/OBS HIGH 75: CPT | Performed by: CLINICAL NURSE SPECIALIST

## 2021-08-11 RX ORDER — METOPROLOL SUCCINATE 25 MG/1
25 TABLET, EXTENDED RELEASE ORAL
Status: DISCONTINUED | OUTPATIENT
Start: 2021-08-12 | End: 2021-08-21 | Stop reason: HOSPADM

## 2021-08-11 RX ORDER — ATORVASTATIN CALCIUM 40 MG/1
40 TABLET, FILM COATED ORAL DAILY
Status: DISCONTINUED | OUTPATIENT
Start: 2021-08-12 | End: 2021-08-21 | Stop reason: HOSPADM

## 2021-08-11 RX ADMIN — TAMSULOSIN HYDROCHLORIDE 0.4 MG: 0.4 CAPSULE ORAL at 09:25

## 2021-08-11 RX ADMIN — SODIUM CHLORIDE, PRESERVATIVE FREE 10 ML: 5 INJECTION INTRAVENOUS at 20:05

## 2021-08-11 RX ADMIN — ATORVASTATIN CALCIUM 20 MG: 10 TABLET, FILM COATED ORAL at 09:25

## 2021-08-11 RX ADMIN — CLOPIDOGREL 75 MG: 75 TABLET, FILM COATED ORAL at 09:25

## 2021-08-11 RX ADMIN — ENOXAPARIN SODIUM 100 MG: 100 INJECTION SUBCUTANEOUS at 09:25

## 2021-08-11 RX ADMIN — METOPROLOL TARTRATE 25 MG: 25 TABLET, FILM COATED ORAL at 09:25

## 2021-08-11 RX ADMIN — GADOBENATE DIMEGLUMINE 20 ML: 529 INJECTION, SOLUTION INTRAVENOUS at 12:26

## 2021-08-11 RX ADMIN — ENOXAPARIN SODIUM 100 MG: 100 INJECTION SUBCUTANEOUS at 20:05

## 2021-08-11 NOTE — PLAN OF CARE
Goal Outcome Evaluation:           Progress: no change   VSS. No c/o pain. S 79-96 on tele, PVC Coup. MRI of brain completed. Evidence of embolic stroke found. Lumbar puncture possible. Safety maintained.
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
## 2021-08-11 NOTE — CONSULTS
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 anticoagulation-currently on therapeutic Lovenox, okay to transition back to Eliquis 5 mg twice daily when okay with others.    8.  Possible splenic infarcts on imaging this admission        I discussed the patient's findings and my recommendations with patient and nursing staff.     Electronically signed by LUDWIG Forbes, 08/11/21, 10:15 AM CDT.

## 2021-08-12 ENCOUNTER — APPOINTMENT (OUTPATIENT)
Dept: ULTRASOUND IMAGING | Facility: HOSPITAL | Age: 56
End: 2021-08-12

## 2021-08-12 ENCOUNTER — APPOINTMENT (OUTPATIENT)
Dept: CARDIOLOGY | Facility: HOSPITAL | Age: 56
End: 2021-08-12

## 2021-08-12 PROBLEM — I33.0 ACUTE BACTERIAL ENDOCARDITIS: Status: ACTIVE | Noted: 2021-08-12

## 2021-08-12 PROBLEM — I63.40 CEREBROVASCULAR ACCIDENT (CVA) DUE TO EMBOLISM OF CEREBRAL ARTERY (HCC): Status: ACTIVE | Noted: 2021-08-12

## 2021-08-12 LAB
ALBUMIN SERPL-MCNC: 3.2 G/DL (ref 3.5–5.2)
ALBUMIN/GLOB SERPL: 0.8 G/DL
ALP SERPL-CCNC: 88 U/L (ref 39–117)
ALT SERPL W P-5'-P-CCNC: 20 U/L (ref 1–41)
ANION GAP SERPL CALCULATED.3IONS-SCNC: 8 MMOL/L (ref 5–15)
AST SERPL-CCNC: 28 U/L (ref 1–40)
BASOPHILS # BLD AUTO: 0.02 10*3/MM3 (ref 0–0.2)
BASOPHILS NFR BLD AUTO: 0.2 % (ref 0–1.5)
BH CV ECHO MEAS - AO MAX PG (FULL): 1.7 MMHG
BH CV ECHO MEAS - AO MAX PG: 5.7 MMHG
BH CV ECHO MEAS - AO MEAN PG (FULL): 1 MMHG
BH CV ECHO MEAS - AO MEAN PG: 4 MMHG
BH CV ECHO MEAS - AO ROOT AREA (BSA CORRECTED): 1.6
BH CV ECHO MEAS - AO ROOT AREA: 10.8 CM^2
BH CV ECHO MEAS - AO ROOT DIAM: 3.7 CM
BH CV ECHO MEAS - AO V2 MAX: 119 CM/SEC
BH CV ECHO MEAS - AO V2 MEAN: 90.6 CM/SEC
BH CV ECHO MEAS - AO V2 VTI: 19 CM
BH CV ECHO MEAS - AVA(I,A): 4.2 CM^2
BH CV ECHO MEAS - AVA(I,D): 4.2 CM^2
BH CV ECHO MEAS - AVA(V,A): 3.8 CM^2
BH CV ECHO MEAS - AVA(V,D): 3.8 CM^2
BH CV ECHO MEAS - BSA(HAYCOCK): 2.3 M^2
BH CV ECHO MEAS - BSA: 2.3 M^2
BH CV ECHO MEAS - BZI_BMI: 25.1 KILOGRAMS/M^2
BH CV ECHO MEAS - BZI_METRIC_HEIGHT: 195.6 CM
BH CV ECHO MEAS - BZI_METRIC_WEIGHT: 96.2 KG
BH CV ECHO MEAS - EDV(CUBED): 284.9 ML
BH CV ECHO MEAS - EDV(MOD-SP2): 197 ML
BH CV ECHO MEAS - EDV(MOD-SP4): 238 ML
BH CV ECHO MEAS - EDV(TEICH): 222.1 ML
BH CV ECHO MEAS - EF(CUBED): 59 %
BH CV ECHO MEAS - EF(MOD-SP2): 38.1 %
BH CV ECHO MEAS - EF(MOD-SP4): 28.6 %
BH CV ECHO MEAS - EF(TEICH): 49.4 %
BH CV ECHO MEAS - ESV(CUBED): 116.9 ML
BH CV ECHO MEAS - ESV(MOD-SP2): 122 ML
BH CV ECHO MEAS - ESV(MOD-SP4): 170 ML
BH CV ECHO MEAS - ESV(TEICH): 112.3 ML
BH CV ECHO MEAS - FS: 25.7 %
BH CV ECHO MEAS - IVS/LVPW: 0.76
BH CV ECHO MEAS - IVSD: 0.72 CM
BH CV ECHO MEAS - LAT PEAK E' VEL: 10.9 CM/SEC
BH CV ECHO MEAS - LV DIASTOLIC VOL/BSA (35-75): 103.8 ML/M^2
BH CV ECHO MEAS - LV MASS(C)D: 231.3 GRAMS
BH CV ECHO MEAS - LV MASS(C)DI: 100.9 GRAMS/M^2
BH CV ECHO MEAS - LV MAX PG: 4 MMHG
BH CV ECHO MEAS - LV MEAN PG: 3 MMHG
BH CV ECHO MEAS - LV SYSTOLIC VOL/BSA (12-30): 74.1 ML/M^2
BH CV ECHO MEAS - LV V1 MAX: 100 CM/SEC
BH CV ECHO MEAS - LV V1 MEAN: 77.3 CM/SEC
BH CV ECHO MEAS - LV V1 VTI: 17.5 CM
BH CV ECHO MEAS - LVIDD: 6.3 CM
BH CV ECHO MEAS - LVIDS: 4.9 CM
BH CV ECHO MEAS - LVLD AP2: 9.7 CM
BH CV ECHO MEAS - LVLD AP4: 10.4 CM
BH CV ECHO MEAS - LVLS AP2: 8.8 CM
BH CV ECHO MEAS - LVLS AP4: 9.4 CM
BH CV ECHO MEAS - LVOT AREA (M): 4.5 CM^2
BH CV ECHO MEAS - LVOT AREA: 4.5 CM^2
BH CV ECHO MEAS - LVOT DIAM: 2.4 CM
BH CV ECHO MEAS - LVPWD: 0.95 CM
BH CV ECHO MEAS - MED PEAK E' VEL: 3.5 CM/SEC
BH CV ECHO MEAS - MR MAX PG: 92.7 MMHG
BH CV ECHO MEAS - MR MAX VEL: 481.3 CM/SEC
BH CV ECHO MEAS - MR MEAN PG: 67 MMHG
BH CV ECHO MEAS - MR MEAN VEL: 391 CM/SEC
BH CV ECHO MEAS - MR VTI: 125 CM
BH CV ECHO MEAS - MV A MAX VEL: 107 CM/SEC
BH CV ECHO MEAS - MV DEC SLOPE: 694.5 CM/SEC^2
BH CV ECHO MEAS - MV DEC TIME: 0.27 SEC
BH CV ECHO MEAS - MV E MAX VEL: 149 CM/SEC
BH CV ECHO MEAS - MV E/A: 1.4
BH CV ECHO MEAS - MV MAX PG: 10 MMHG
BH CV ECHO MEAS - MV MEAN PG: 6 MMHG
BH CV ECHO MEAS - MV P1/2T MAX VEL: 162 CM/SEC
BH CV ECHO MEAS - MV P1/2T: 68.3 MSEC
BH CV ECHO MEAS - MV V2 MAX: 158 CM/SEC
BH CV ECHO MEAS - MV V2 MEAN: 122 CM/SEC
BH CV ECHO MEAS - MV V2 VTI: 34.7 CM
BH CV ECHO MEAS - MVA P1/2T LCG: 1.4 CM^2
BH CV ECHO MEAS - MVA(P1/2T): 3.2 CM^2
BH CV ECHO MEAS - MVA(VTI): 2.3 CM^2
BH CV ECHO MEAS - RAP SYSTOLE: 5 MMHG
BH CV ECHO MEAS - RVSP: 18 MMHG
BH CV ECHO MEAS - SI(AO): 89.1 ML/M^2
BH CV ECHO MEAS - SI(CUBED): 73.3 ML/M^2
BH CV ECHO MEAS - SI(LVOT): 34.5 ML/M^2
BH CV ECHO MEAS - SI(MOD-SP2): 32.7 ML/M^2
BH CV ECHO MEAS - SI(MOD-SP4): 29.7 ML/M^2
BH CV ECHO MEAS - SI(TEICH): 47.9 ML/M^2
BH CV ECHO MEAS - SV(AO): 204.3 ML
BH CV ECHO MEAS - SV(CUBED): 168 ML
BH CV ECHO MEAS - SV(LVOT): 79.2 ML
BH CV ECHO MEAS - SV(MOD-SP2): 75 ML
BH CV ECHO MEAS - SV(MOD-SP4): 68 ML
BH CV ECHO MEAS - SV(TEICH): 109.8 ML
BH CV ECHO MEAS - TAPSE (>1.6): 1.8 CM
BH CV ECHO MEAS - TR MAX VEL: 183 CM/SEC
BH CV ECHO MEASUREMENTS AVERAGE E/E' RATIO: 20.69
BH CV XLRA - TDI S': 11.9 CM/SEC
BILIRUB SERPL-MCNC: 0.4 MG/DL (ref 0–1.2)
BUN SERPL-MCNC: 11 MG/DL (ref 6–20)
BUN/CREAT SERPL: 14.1 (ref 7–25)
CALCIUM SPEC-SCNC: 9.3 MG/DL (ref 8.6–10.5)
CHLORIDE SERPL-SCNC: 100 MMOL/L (ref 98–107)
CO2 SERPL-SCNC: 26 MMOL/L (ref 22–29)
CREAT SERPL-MCNC: 0.78 MG/DL (ref 0.76–1.27)
DEPRECATED RDW RBC AUTO: 38.5 FL (ref 37–54)
EOSINOPHIL # BLD AUTO: 0.11 10*3/MM3 (ref 0–0.4)
EOSINOPHIL NFR BLD AUTO: 1.2 % (ref 0.3–6.2)
ERYTHROCYTE [DISTWIDTH] IN BLOOD BY AUTOMATED COUNT: 12.1 % (ref 12.3–15.4)
GFR SERPL CREATININE-BSD FRML MDRD: 103 ML/MIN/1.73
GLOBULIN UR ELPH-MCNC: 4 GM/DL
GLUCOSE SERPL-MCNC: 121 MG/DL (ref 65–99)
HCT VFR BLD AUTO: 34 % (ref 37.5–51)
HGB BLD-MCNC: 11 G/DL (ref 13–17.7)
IMM GRANULOCYTES # BLD AUTO: 0.05 10*3/MM3 (ref 0–0.05)
IMM GRANULOCYTES NFR BLD AUTO: 0.6 % (ref 0–0.5)
LEFT ATRIUM VOLUME INDEX: 21 ML/M2
LYMPHOCYTES # BLD AUTO: 1.01 10*3/MM3 (ref 0.7–3.1)
LYMPHOCYTES NFR BLD AUTO: 11.1 % (ref 19.6–45.3)
MAXIMAL PREDICTED HEART RATE: 163 BPM
MCH RBC QN AUTO: 28.1 PG (ref 26.6–33)
MCHC RBC AUTO-ENTMCNC: 32.4 G/DL (ref 31.5–35.7)
MCV RBC AUTO: 86.7 FL (ref 79–97)
MONOCYTES # BLD AUTO: 0.52 10*3/MM3 (ref 0.1–0.9)
MONOCYTES NFR BLD AUTO: 5.7 % (ref 5–12)
NEUTROPHILS NFR BLD AUTO: 7.37 10*3/MM3 (ref 1.7–7)
NEUTROPHILS NFR BLD AUTO: 81.2 % (ref 42.7–76)
NRBC BLD AUTO-RTO: 0 /100 WBC (ref 0–0.2)
PLATELET # BLD AUTO: 366 10*3/MM3 (ref 140–450)
PMV BLD AUTO: 10.3 FL (ref 6–12)
POTASSIUM SERPL-SCNC: 4 MMOL/L (ref 3.5–5.2)
PROT SERPL-MCNC: 7.2 G/DL (ref 6–8.5)
RBC # BLD AUTO: 3.92 10*6/MM3 (ref 4.14–5.8)
SODIUM SERPL-SCNC: 134 MMOL/L (ref 136–145)
STRESS TARGET HR: 139 BPM
WBC # BLD AUTO: 9.08 10*3/MM3 (ref 3.4–10.8)

## 2021-08-12 PROCEDURE — 87186 SC STD MICRODIL/AGAR DIL: CPT | Performed by: PSYCHIATRY & NEUROLOGY

## 2021-08-12 PROCEDURE — 87040 BLOOD CULTURE FOR BACTERIA: CPT | Performed by: PSYCHIATRY & NEUROLOGY

## 2021-08-12 PROCEDURE — 97165 OT EVAL LOW COMPLEX 30 MIN: CPT | Performed by: OCCUPATIONAL THERAPIST

## 2021-08-12 PROCEDURE — 25010000002 PERFLUTREN 6.52 MG/ML SUSPENSION: Performed by: FAMILY MEDICINE

## 2021-08-12 PROCEDURE — 87150 DNA/RNA AMPLIFIED PROBE: CPT | Performed by: PSYCHIATRY & NEUROLOGY

## 2021-08-12 PROCEDURE — 93970 EXTREMITY STUDY: CPT | Performed by: SURGERY

## 2021-08-12 PROCEDURE — 85025 COMPLETE CBC W/AUTO DIFF WBC: CPT | Performed by: NURSE PRACTITIONER

## 2021-08-12 PROCEDURE — 99233 SBSQ HOSP IP/OBS HIGH 50: CPT | Performed by: INTERNAL MEDICINE

## 2021-08-12 PROCEDURE — 93306 TTE W/DOPPLER COMPLETE: CPT | Performed by: INTERNAL MEDICINE

## 2021-08-12 PROCEDURE — 99233 SBSQ HOSP IP/OBS HIGH 50: CPT | Performed by: PSYCHIATRY & NEUROLOGY

## 2021-08-12 PROCEDURE — 93880 EXTRACRANIAL BILAT STUDY: CPT | Performed by: SURGERY

## 2021-08-12 PROCEDURE — 99231 SBSQ HOSP IP/OBS SF/LOW 25: CPT | Performed by: FAMILY MEDICINE

## 2021-08-12 PROCEDURE — 93306 TTE W/DOPPLER COMPLETE: CPT

## 2021-08-12 PROCEDURE — 25010000002 VANCOMYCIN 10 G RECONSTITUTED SOLUTION: Performed by: PSYCHIATRY & NEUROLOGY

## 2021-08-12 PROCEDURE — 87077 CULTURE AEROBIC IDENTIFY: CPT | Performed by: PSYCHIATRY & NEUROLOGY

## 2021-08-12 PROCEDURE — 25010000002 VANCOMYCIN: Performed by: PSYCHIATRY & NEUROLOGY

## 2021-08-12 PROCEDURE — 93970 EXTREMITY STUDY: CPT

## 2021-08-12 PROCEDURE — 80053 COMPREHEN METABOLIC PANEL: CPT | Performed by: NURSE PRACTITIONER

## 2021-08-12 PROCEDURE — 99255 IP/OBS CONSLTJ NEW/EST HI 80: CPT | Performed by: NURSE PRACTITIONER

## 2021-08-12 PROCEDURE — 93880 EXTRACRANIAL BILAT STUDY: CPT

## 2021-08-12 RX ORDER — LOSARTAN POTASSIUM 25 MG/1
12.5 TABLET ORAL
Status: DISCONTINUED | OUTPATIENT
Start: 2021-08-12 | End: 2021-08-21 | Stop reason: HOSPADM

## 2021-08-12 RX ORDER — ASPIRIN 81 MG/1
81 TABLET ORAL DAILY
Status: DISCONTINUED | OUTPATIENT
Start: 2021-08-12 | End: 2021-08-21 | Stop reason: HOSPADM

## 2021-08-12 RX ADMIN — ATORVASTATIN CALCIUM 40 MG: 40 TABLET, FILM COATED ORAL at 11:11

## 2021-08-12 RX ADMIN — METOPROLOL SUCCINATE 25 MG: 25 TABLET, FILM COATED, EXTENDED RELEASE ORAL at 11:10

## 2021-08-12 RX ADMIN — ASPIRIN 81 MG: 81 TABLET ORAL at 13:25

## 2021-08-12 RX ADMIN — PERFLUTREN 8.48 MG: 6.52 INJECTION, SUSPENSION INTRAVENOUS at 07:11

## 2021-08-12 RX ADMIN — SODIUM CHLORIDE, PRESERVATIVE FREE 10 ML: 5 INJECTION INTRAVENOUS at 11:13

## 2021-08-12 RX ADMIN — VANCOMYCIN HYDROCHLORIDE 1500 MG: 10 INJECTION, POWDER, LYOPHILIZED, FOR SOLUTION INTRAVENOUS at 23:57

## 2021-08-12 RX ADMIN — CLOPIDOGREL 75 MG: 75 TABLET, FILM COATED ORAL at 11:11

## 2021-08-12 RX ADMIN — TAMSULOSIN HYDROCHLORIDE 0.4 MG: 0.4 CAPSULE ORAL at 11:11

## 2021-08-12 RX ADMIN — SODIUM CHLORIDE, PRESERVATIVE FREE 10 ML: 5 INJECTION INTRAVENOUS at 20:03

## 2021-08-12 RX ADMIN — LOSARTAN POTASSIUM 12.5 MG: 25 TABLET, FILM COATED ORAL at 13:24

## 2021-08-12 RX ADMIN — VANCOMYCIN HYDROCHLORIDE 2000 MG: 1 INJECTION, POWDER, LYOPHILIZED, FOR SOLUTION INTRAVENOUS at 12:38

## 2021-08-12 NOTE — PLAN OF CARE
Goal Outcome Evaluation:  Plan of Care Reviewed With: patient           Outcome Summary: OT eval completed. Pt is alert and oriented x4, sitting up in bed in no apparent distress upon entry to room. He was able to complete bed mobility, sit <> stand t/fs, all functional mobility, LBD and self-feeding skills independently. B UE and B LE strength 5/5. GMC/FMC B intact. OT to sign off at this time as he does not display deficit which require skilled OT services at this time. Please reconsult if there is a change in status. Anticipate d/c home.

## 2021-08-12 NOTE — PROGRESS NOTES
"CC:\"im about the same\"---mri reviewed and neuro/cardiology note seen    Review of Systems   Constitutional: Positive for activity change and fatigue.   HENT: Negative.    Eyes: Negative.    Respiratory: Negative.    Cardiovascular: Negative.    Gastrointestinal: Negative.    Endocrine: Negative.  Negative for cold intolerance.   Genitourinary: Negative.    Musculoskeletal: Negative.    Skin: Negative.    Allergic/Immunologic: Negative.    Neurological: Negative.    Hematological: Negative.    Psychiatric/Behavioral: Negative.      Temp:  [97.9 °F (36.6 °C)-99.3 °F (37.4 °C)] 99.3 °F (37.4 °C)  Heart Rate:  [] 99  Resp:  [16-18] 16  BP: (107-126)/(61-75) 109/65  I/O last 3 completed shifts:  In: 240 [P.O.:240]  Out: -   I/O this shift:  In: 120 [P.O.:120]  Out: 1125 [Urine:1125]    Physical Exam  Vitals and nursing note reviewed.   Constitutional:       Appearance: Normal appearance. He is normal weight.   HENT:      Head: Normocephalic and atraumatic.      Nose: Nose normal.      Mouth/Throat:      Mouth: Mucous membranes are moist.      Pharynx: Oropharynx is clear.   Eyes:      Extraocular Movements: Extraocular movements intact.      Conjunctiva/sclera: Conjunctivae normal.      Pupils: Pupils are equal, round, and reactive to light.   Cardiovascular:      Rate and Rhythm: Normal rate and regular rhythm.      Pulses: Normal pulses.      Heart sounds: Normal heart sounds.   Pulmonary:      Effort: Pulmonary effort is normal.      Breath sounds: Normal breath sounds.   Abdominal:      General: Abdomen is flat. Bowel sounds are normal.   Musculoskeletal:         General: Normal range of motion.      Cervical back: Normal range of motion and neck supple.   Skin:     General: Skin is warm and dry.      Capillary Refill: Capillary refill takes less than 2 seconds.   Neurological:      General: No focal deficit present.      Mental Status: He is alert and oriented to person, place, and time. Mental status is at " baseline.   Psychiatric:         Mood and Affect: Mood normal.         Behavior: Behavior normal.         Thought Content: Thought content normal.         Judgment: Judgment normal.           Elevated troponin    Confusion    Numbness of tongue    Appreciate help of consultants---w/u in progress
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
## 2021-08-12 NOTE — PLAN OF CARE
Goal Outcome Evaluation:              Outcome Summary: PT screen completed per chart review and OT report. Pt with no balance, strength, coordination, or mobility deficits and has no concerns with return home. PT to sign off. Anticipate pt to d/c home with assist.

## 2021-08-12 NOTE — PROGRESS NOTES
[facility] -  Progress Note     LOS: 2 days   Patient Care Team:  Shad Curiel MD as PCP - General (Family Medicine)  William Gupta MD Knox, Michael Landers, MD as Consulting Physician (Urology)    Chief Complaint: follow up mitral valve endocarditis, acute strokes, also with CAD, CHF history     Subjective     Interval History:     Patient Complaints: The patient is currently resting comfortably in bed.  He denies any chest pain, shortness of breath, orthopnea, PND, palpitations.  He admits some weakness and fatigue.  Did have a temperature of 99.1 last night.  He reports he is having a little trouble processing things, and is somewhat slow to respond verbally in certain situations but he denies any speech difficulties.    MRI revealed multiple small embolic strokes in multiple vessel distributions.  Echo also reveals a small, nonmobile mitral valve mass consistent with a vegetation.  Per my discussion with neurology, his embolic strokes are likely secondary to his mitral valve endocarditis.  Dr. Johnson has ordered blood cultures and initiated IV vancomycin today and consulted ID.    He remains in normal sinus rhythm/sinus tachycardia with heart rates in the 80s to low 100s with frequent PVCs.    He remains without his usual Lasix 20 mg daily p.o. at home, but does not exhibit signs of volume overload and appears to be net negative around 700 cc this admission.        Review of Systems:     Review of Systems   Constitutional: Positive for fatigue. Negative for diaphoresis, fever and unexpected weight change.   HENT: Negative for nosebleeds.    Respiratory: Negative for apnea, cough, chest tightness, shortness of breath and wheezing.    Cardiovascular: Negative for chest pain, palpitations and leg swelling.   Gastrointestinal: Negative for abdominal distention, nausea and vomiting.   Genitourinary: Negative for hematuria.   Musculoskeletal: Negative for gait problem.    Skin: Negative for color change.   Neurological: Positive for weakness. Negative for dizziness, syncope and light-headedness.     Objective     Vital Sign Min/Max for last 24 hours  Temp  Min: 97.9 °F (36.6 °C)  Max: 99.3 °F (37.4 °C)   BP  Min: 107/62  Max: 126/75   Pulse  Min: 84  Max: 109   Resp  Min: 16  Max: 18   SpO2  Min: 95 %  Max: 98 %   No data recorded   No data recorded         08/10/21  2009   Weight: 95.3 kg (210 lb)       Physical Exam:    Vitals and nursing note reviewed.   Constitutional:       General: Not in acute distress.     Appearance: Well-developed and not in distress. Not diaphoretic.   Neck:      Vascular: No JVD.   Pulmonary:      Effort: Pulmonary effort is normal. No respiratory distress.      Breath sounds: Normal breath sounds.   Cardiovascular:      Normal rate. Regular rhythm.      Murmurs: There is no murmur.   Edema:     Peripheral edema absent.   Abdominal:      Tenderness: There is no abdominal tenderness.   Skin:     General: Skin is warm and dry.   Neurological:      Mental Status: Alert and oriented to person, place, and time.       Results Review:   Lab Results (last 72 hours)     Procedure Component Value Units Date/Time    Vitamin B12 [[id number]]  (Normal) Collected: 08/10/21 1038    Specimen: Blood Updated: 08/11/21 2306     Vitamin B-12 669 pg/mL     Narrative:      Results may be falsely increased if patient taking Biotin.      C-reactive Protein [[id number]]  (Abnormal) Collected: 08/11/21 1350    Specimen: Blood Updated: 08/11/21 1434     C-Reactive Protein 6.38 mg/dL     Sedimentation Rate [[id number]]  (Abnormal) Collected: 08/11/21 1350    Specimen: Blood Updated: 08/11/21 1409     Sed Rate 26 mm/hr     P2Y12 Platelet Inhibition [[id number]]  (Abnormal) Collected: 08/11/21 1302    Specimen: Blood Updated: 08/11/21 1359     Hematocrit 36.7 %      Platelets 383 10*3/mm3      P2Y12 Reactivity Unit 217 PRU     Narrative:      PRU reference range 194-418 is for patients  not receiving P2Y12 drug. Post Drug results: Lower PRU levels are associated with expected antiplatelet effect. Values may be below the stated reference range above. The post-drug PRU values reported are .          Hemoglobin A1c [[id number]]  (Abnormal) Collected: 08/10/21 0959    Specimen: Blood Updated: 08/11/21 1321     Hemoglobin A1C 5.90 %     Narrative:      Hemoglobin A1C Ranges:    Increased Risk for Diabetes  5.7% to 6.4%  Diabetes                     >= 6.5%  Diabetic Goal                < 7.0%    Magnesium [[id number]]  (Normal) Collected: 08/11/21 0836    Specimen: Blood Updated: 08/11/21 0952     Magnesium 2.1 mg/dL     Lipid Panel [[id number]]  (Abnormal) Collected: 08/10/21 1038    Specimen: Blood Updated: 08/11/21 0948     Total Cholesterol 140 mg/dL      Triglycerides 130 mg/dL      HDL Cholesterol 21 mg/dL      LDL Cholesterol  95 mg/dL      VLDL Cholesterol 24 mg/dL      LDL/HDL Ratio 4.43    Narrative:      Cholesterol Reference Ranges  (U.S. Department of Health and Human Services ATP III Classifications)    Desirable          <200 mg/dL  Borderline High    200-239 mg/dL  High Risk          >240 mg/dL      Triglyceride Reference Ranges  (U.S. Department of Health and Human Services ATP III Classifications)    Normal           <150 mg/dL  Borderline High  150-199 mg/dL  High             200-499 mg/dL  Very High        >500 mg/dL    HDL Reference Ranges  (U.S. Department of Health and Human Services ATP III Classifcations)    Low     <40 mg/dl (major risk factor for CHD)  High    >60 mg/dl ('negative' risk factor for CHD)        LDL Reference Ranges  (U.S. Department of Health and Human Services ATP III Classifcations)    Optimal          <100 mg/dL  Near Optimal     100-129 mg/dL  Borderline High  130-159 mg/dL  High             160-189 mg/dL  Very High        >189 mg/dL    TSH [[id number]]  (Normal) Collected: 08/10/21 1038    Specimen: Blood Updated: 08/11/21 0948     TSH 0.812 uIU/mL      Troponin [[id number]]  (Abnormal) Collected: 08/10/21 1833    Specimen: Blood Updated: 08/10/21 1924     Troponin T 0.065 ng/mL     Narrative:      Troponin T Reference Range:  <= 0.03 ng/mL-   Negative for AMI  >0.03 ng/mL-     Abnormal for myocardial necrosis.  Clinicians would have to utilize clinical acumen, EKG, Troponin and serial changes to determine if it is an Acute Myocardial Infarction or myocardial injury due to an underlying chronic condition.       Results may be falsely decreased if patient taking Biotin.      Troponin [[id number]]  (Abnormal) Collected: 08/10/21 1323    Specimen: Blood Updated: 08/10/21 1427     Troponin T 0.061 ng/mL     Narrative:      Troponin T Reference Range:  <= 0.03 ng/mL-   Negative for AMI  >0.03 ng/mL-     Abnormal for myocardial necrosis.  Clinicians would have to utilize clinical acumen, EKG, Troponin and serial changes to determine if it is an Acute Myocardial Infarction or myocardial injury due to an underlying chronic condition.       Results may be falsely decreased if patient taking Biotin.      Ammonia [[id number]]  (Abnormal) Collected: 08/10/21 1230    Specimen: Blood Updated: 08/10/21 1311     Ammonia 14 umol/L     Urine Drug Screen - Urine, Clean Catch [[id number]]  (Abnormal) Collected: 08/10/21 1229    Specimen: Urine, Clean Catch Updated: 08/10/21 1302     THC, Screen, Urine Negative     Phencyclidine (PCP), Urine Negative     Cocaine Screen, Urine Negative     Methamphetamine, Ur Negative     Opiate Screen Positive     Amphetamine Screen, Urine Negative     Benzodiazepine Screen, Urine Negative     Tricyclic Antidepressants Screen Negative     Methadone Screen, Urine Negative     Barbiturates Screen, Urine Negative     Oxycodone Screen, Urine Negative     Propoxyphene Screen Negative     Buprenorphine, Screen, Urine Negative    Narrative:      Cutoff For Drugs Screened:    Amphetamines               500 ng/ml  Barbiturates               200  ng/ml  Benzodiazepines            150 ng/ml  Cocaine                    150 ng/ml  Methadone                  200 ng/ml  Opiates                    100 ng/ml  Phencyclidine               25 ng/ml  THC                            50 ng/ml  Methamphetamine            500 ng/ml  Tricyclic Antidepressants  300 ng/ml  Oxycodone                  100 ng/ml  Propoxyphene               300 ng/ml  Buprenorphine               10 ng/ml    The normal value for all drugs tested is negative. This report includes unconfirmed screening results, with the cutoff values listed, to be used for medical treatment purposes only.  Unconfirmed results must not be used for non-medical purposes such as employment or legal testing.  Clinical consideration should be applied to any drug of abuse test, particularly when unconfirmed results are used.      Urinalysis, Microscopic Only - Urine, Clean Catch [[id number]]  (Abnormal) Collected: 08/10/21 1229    Specimen: Urine, Clean Catch Updated: 08/10/21 1250     RBC, UA 3-5 /HPF      WBC, UA 0-2 /HPF      Bacteria, UA None Seen /HPF      Squamous Epithelial Cells, UA None Seen /HPF      Hyaline Casts, UA None Seen /LPF      Methodology Automated Microscopy    Urinalysis With Culture If Indicated - Urine, Clean Catch [[id number]]  (Abnormal) Collected: 08/10/21 1229    Specimen: Urine, Clean Catch Updated: 08/10/21 1250     Color, UA Yellow     Appearance, UA Clear     pH, UA 5.5     Specific Gravity, UA >1.030     Glucose, UA Negative     Ketones, UA Negative     Bilirubin, UA Negative     Blood, UA Small (1+)     Protein, UA Negative     Leuk Esterase, UA Negative     Nitrite, UA Negative     Urobilinogen, UA 1.0 E.U./dL    Ethanol [[id number]] Collected: 08/10/21 1038    Specimen: Blood Updated: 08/10/21 1236     Ethanol % <0.010 %     Narrative:      Not for legal purposes. Chain of Custody not followed.     [place] Draw [[id number]] Collected: 08/10/21 0959    Specimen: Blood Updated: 08/10/21 1145     Narrative:      The following orders were created for panel order [place] Draw.  Procedure                               Abnormality         Status                     ---------                               -----------         ------                     Green Top (Gel)[[id number]]                                  Final result               Lavender Top[[id number]]                                     Final result               Red Top[[id number]]                                          Final result                 Please view results for these tests on the individual orders.    Red Top [[id number]] Collected: 08/10/21 1038    Specimen: Blood Updated: 08/10/21 1145     Extra Tube Hold for add-ons.     Comment: Auto resulted.       Green Top (Gel) [[id number]] Collected: 08/10/21 1038    Specimen: Blood Updated: 08/10/21 1145     Extra Tube Hold for add-ons.     Comment: Auto resulted.       Troponin [[id number]]  (Abnormal) Collected: 08/10/21 1038    Specimen: Blood Updated: 08/10/21 1133     Troponin T 0.066 ng/mL     Narrative:      Troponin T Reference Range:  <= 0.03 ng/mL-   Negative for AMI  >0.03 ng/mL-     Abnormal for myocardial necrosis.  Clinicians would have to utilize clinical acumen, EKG, Troponin and serial changes to determine if it is an Acute Myocardial Infarction or myocardial injury due to an underlying chronic condition.       Results may be falsely decreased if patient taking Biotin.      Comprehensive Metabolic Panel [[id number]]  (Abnormal) Collected: 08/10/21 1038    Specimen: Blood Updated: 08/10/21 1132     Glucose 110 mg/dL      BUN 11 mg/dL      Creatinine 0.84 mg/dL      Sodium 135 mmol/L      Potassium 4.2 mmol/L      Chloride 100 mmol/L      CO2 25.0 mmol/L      Calcium 9.1 mg/dL      Total Protein 7.5 g/dL      Albumin 3.30 g/dL      ALT (SGPT) 14 U/L      AST (SGOT) 22 U/L      Alkaline Phosphatase 88 U/L      Total Bilirubin 0.4 mg/dL      eGFR Non African Amer 94 mL/min/1.73       Globulin 4.2 gm/dL      A/G Ratio 0.8 g/dL      BUN/Creatinine Ratio 13.1     Anion Gap 10.0 mmol/L     Narrative:      GFR Normal >60  Chronic Kidney Disease <60  Kidney Failure <15      BNP [[id number]]  (Normal) Collected: 08/10/21 1038    Specimen: Blood Updated: 08/10/21 1127     proBNP 746.5 pg/mL     Narrative:      Among patients with dyspnea, NT-proBNP is highly sensitive for the detection of acute congestive heart failure. In addition NT-proBNP of <300 pg/ml effectively rules out acute congestive heart failure with 99% negative predictive value.    Results may be falsely decreased if patient taking Biotin.      Lipase [[id number]]  (Normal) Collected: 08/10/21 1038    Specimen: Blood Updated: 08/10/21 1127     Lipase 47 U/L     aPTT [[id number]]  (Abnormal) Collected: 08/10/21 1038    Specimen: Blood from Arm, Left Updated: 08/10/21 1117     PTT 49.9 seconds     Protime-INR [[id number]]  (Abnormal) Collected: 08/10/21 1038    Specimen: Blood from Arm, Left Updated: 08/10/21 1116     Protime 17.3 Seconds      INR 1.54    COVID-19,[employer] IN-HOUSE,Nasal Swab No Transport Media 3-4 HR TAT - Swab, Nasal Cavity [[id number]]  (Normal) Collected: 08/10/21 1009    Specimen: Swab from Nasal Cavity Updated: 08/10/21 1109     COVID19 Not Detected    Narrative:      Fact sheet for providers: https://www.fda.gov/media/331327/download     Fact sheet for patients: https://www.fda.gov/media/434346/download    Test performed by PCR.    Consider negative results in combination with clinical observations, patient history, and epidemiological information.  Fact sheet for providers: https://www.fda.gov/media/116842/download     Fact sheet for patients: https://www.fda.gov/media/919372/download    Test performed by PCR.    Consider negative results in combination with clinical observations, patient history, and epidemiological information.    Courtney Top [[id number]] Collected: 08/10/21 0959    Specimen: Blood Updated: 08/10/21  1100     Extra Tube hold for add-on     Comment: Auto resulted       Blood Gas, Arterial - [[id number]]  (Abnormal) Collected: 08/10/21 1022    Specimen: Arterial Blood Updated: 08/10/21 1024     Site Right Radial     Jacky's Test Positive     pH, Arterial 7.486 pH units      Comment: 83 Value above reference range        pCO2, Arterial 34.0 mm Hg      Comment: 84 Value below reference range        pO2, Arterial 70.8 mm Hg      Comment: 84 Value below reference range        HCO3, Arterial 25.7 mmol/L      Base Excess, Arterial 2.6 mmol/L      Comment: 83 Value above reference range        O2 Saturation, Arterial 95.0 %      Temperature 37.0 C      Barometric Pressure for Blood Gas 753 mmHg      Modality Room Air     Ventilator Mode NA     Collected by [id number]     Comment: Meter: [id number]     :  [id number]        pCO2, Temperature Corrected 34.0 mm Hg      pH, Temp Corrected 7.486 pH Units      pO2, Temperature Corrected 70.8 mm Hg     CBC & Differential [[id number]]  (Abnormal) Collected: 08/10/21 0959    Specimen: Blood Updated: 08/10/21 1018    Narrative:      The following orders were created for panel order CBC & Differential.  Procedure                               Abnormality         Status                     ---------                               -----------         ------                     CBC Auto Differential[[id number]]        Abnormal            Final result                 Please view results for these tests on the individual orders.    CBC Auto Differential [[id number]]  (Abnormal) Collected: 08/10/21 0959    Specimen: Blood Updated: 08/10/21 1018     WBC 8.53 10*3/mm3      RBC 4.32 10*6/mm3      Hemoglobin 12.2 g/dL      Hematocrit 37.2 %      MCV 86.1 fL      MCH 28.2 pg      MCHC 32.8 g/dL      RDW 12.0 %      RDW-SD 37.8 fl      MPV 10.4 fL      Platelets 364 10*3/mm3      Neutrophil % 79.9 %      Lymphocyte % 10.8 %      Monocyte % 7.5 %      Eosinophil % 1.2 %      Basophil % 0.1 %       Immature Grans % 0.5 %      Neutrophils, Absolute 6.82 10*3/mm3      Lymphocytes, Absolute 0.92 10*3/mm3      Monocytes, Absolute 0.64 10*3/mm3      Eosinophils, Absolute 0.10 10*3/mm3      Basophils, Absolute 0.01 10*3/mm3      Immature Grans, Absolute 0.04 10*3/mm3      nRBC 0.0 /100 WBC               Echo EF Estimated  No results found for: ECHOEFEST      Cath Ejection Fraction Quantitative  No results found for: CATHEF        Medication Review: yes  Current Facility-Administered Medications   Medication Dose Route Frequency Provider Last Rate Last Admin   • atorvastatin (LIPITOR) tablet 40 mg  40 mg Oral Daily Malgorzata Mora APRYARELIS       • clopidogrel (PLAVIX) tablet 75 mg  75 mg Oral Daily Shad Curiel MD   75 mg at 08/11/21 0925   • cyclobenzaprine (FLEXERIL) tablet 5 mg  5 mg Oral TID PRN Shad Curiel MD       • HYDROcodone-acetaminophen (NORCO) 5-325 MG per tablet 1 tablet  1 tablet Oral Q6H PRN Shad Curiel MD       • metoprolol succinate XL (TOPROL-XL) 24 hr tablet 25 mg  25 mg Oral Q24H Malgorzata Mora APRYARELIS       • sodium chloride 0.9 % flush 10 mL  10 mL Intravenous PRN Shad Curiel MD       • sodium chloride 0.9 % flush 10 mL  10 mL Intravenous Q12H Shad Curiel MD   10 mL at 08/11/21 2005   • sodium chloride 0.9 % flush 10 mL  10 mL Intravenous PRN Shad Curiel MD       • tamsulosin (FLOMAX) 24 hr capsule 0.4 mg  0.4 mg Oral Daily Shad Curiel MD   0.4 mg at 08/11/21 0925   • vancomycin 2000 mg/500 mL 0.9% NS IVPB ([facility])  20 mg/kg Intravenous Once Angelo Johnson MD        Followed by   • vancomycin 1500 mg/500 mL 0.9% NS IVPB ([facility])  15 mg/kg Intravenous Q12H Angelo Johnson MD         Echo this admission:    · Left ventricular ejection fraction appears to be 31 - 35%. Left ventricular systolic function is moderately decreased.  · The left ventricular cavity is mildly dilated.  · Estimated right ventricular systolic pressure from  tricuspid regurgitation is normal (<35 mmHg).  · Normal size and function of the right ventricle.  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  · Saline test results are negative.    MRI report:    IMPRESSION:  1. Multiple small areas of acute infarct involving the both cerebellar  and cerebral hemispheres. There are larger infarcts in the left putamen  and left caudate body.  2. Small area of hemosiderin distribution in the left hemisphere  consistent with remote hemorrhage.     Results called to LUDWIG Vaca.     This report was finalized on 08/11/2021 13:24 by Dr. Crow Soto MD.    Assessment/Plan     1.  Mitral valve endocarditis: See echo report above-small nonmobile mitral valve mass present on the anterior leaflet consistent with a vegetation.  -Cultures have been drawn and IV vancomycin has been initiated per Dr. Johnson today.  -ID consult ordered per Dr. Johnson  -Check CBC, CMP    2.  Multiple embolic strokes, likely secondary to #1.  Therapeutic Lovenox has been discontinued per  Aspirin due to excessive bleeding risk d/t strokes associated with infectious emboli.  I discussed the case with Dr. Johnson, and he prefers antiplatelet monotherapy if possible.  Continue Plavix 75 mg daily for now.  See more regarding his need for dual antiplatelet therapy below as well.    3.  Elevated troponin without events of ACS: No further ischemic evaluation warranted.    4.  Coronary artery disease: The patient received 3 drug-eluting stents to the RCA on 5/27/2021 per Dr. Hernandez at [facility] (per extensive record reviewed by Dr. Owsuu, it appears stents were not placed due to ACS).  Ideally he should be on dual antiplatelet therapy (now the  anticoagulation has been discontinued) without interruption for 6 months, or at least 3 months now that he is considered a high bleed risk.  - Continue Plavix 75 mg daily-discussed with Dr. Johnson, he prefers antiplatelet monotherapy if possible given his risk for bleeding associated with his strokes/infectious emboli.  MRI also reports he has a small area of hemosiderin distribution in the left hemisphere consistent with remote hemorrhage.  Dr. Johnson does state that it would be much more reasonable to add baby aspirin than to continue full anticoagulation, if felt to be absolutely necessary.  -I will discuss potentially resuming aspirin 81 mg daily with Dr. Owusu, because at present he is slightly less than 3 months out from drug-eluting stent placement x 3, putting him at higher risk for stent thrombosis without dual antiplatelet therapy.  -Continue high intensity statin, beta-blocker    5.  Right lower extremity DVT-there has been some question about whether or not he has missed some doses of Eliquis.  No PFO to suggest his DVT had embolization to systemic circulation leading to strokes and splenic infarcts.    5.  History of nonischemic cardiomyopathy/chronic systolic heart failure: LVEF on echo this admission is 31 to 35%, slightly decreased compared to what was reported on echo in May 2021 per Dr. Caban.  -Currently compensated/euvolemic on exam and slightly net negative this admission per intake and output documentation.  Therefore, we will continue to hold his usual Lasix 20 mg p.o. daily for now and monitor volume status closely.  -Lopressor transitioned to Toprol-XL yesterday  -Consider LifeVest at discharge  -Start low dose losartan 12.5 mg daily with close attention to BP    7.  Paroxysmal atrial fibrillation: History of ablation around November 2019.  Notes indicate he had recurrent atrial fibrillation after his ablation with associated CHF (notes from his cardiology providers indicate his  nonischemic cardiomyopathy may be related to rapid atrial fibrillation).  The patient also tells me he developed atrial flutter and had frequent PVCs (25%) and had ablations for his atrial flutter and PVCs in March 2021.  He did undergo left atrial appendage ligation in August 2020 at the time of his mitral valve repair, but remained on Eliquis for anticoagulation due to YELENA incomplete closure, leading up to this admission.  -Monitor telemetry-maintain normal sinus rhythm  -Anticoagulation discontinued today due to leading risk associated with cute strokes/infectious emboli     8.    Possible splenic infarcts    9. History of severe mitral regurgitation-August 2020 he underwent mini-mitral valve repair with left atrial appendage ligation and Reese-maze procedure.     ADDENDUM : Case discussed with Dr. Owusu.  Start aspirin 81 mg daily today given recent drug-eluting stent placement x3, less than 3 months ago and risk for stent thrombosis without dual antiplatelet therapy.      Malgorzata Mora, APRN  08/12/21  10:33 CDT
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
## 2021-08-12 NOTE — PLAN OF CARE
Goal Outcome Evaluation:  Plan of Care Reviewed With: patient        Progress: no change  Outcome Summary: Patient c/o urinary retention, bladder scan showed >999, in and out cath done and 1125ml returned. Patient states he has to in and out cath at home sometimes. NPO for possible CARLOS today. VSS. Continue to monitor.
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
## 2021-08-12 NOTE — PROGRESS NOTES
"Pharmacy Dosing Service  Pharmacokinetics  Vancomycin Initial Evaluation    Assessment/Action/Plan:  Pharmacy consulted to dose Vancomycin for endocarditis. Initiated 20mg/kg loading dose, followed by 15mg/kg IV Q12h.     AUC Model Data:  Loading dose: 2000mg  Regimen: 1500 mg IV every 12 hours.  Exposure target: AUC24 (range)400-600 mg/L.hr   AUC24,ss: 497 mg/L.hr  PAUC*: 75 %  Ctrough,ss: 13.4 mg/L  Pconc*: 17 %  Tox.: 9 %    No drug levels ordered at this time. Pharmacy will continue to follow daily and adjust dose accordingly.     Subjective:  Robert Piedra is a 56 y.o. male with a \"Pharmacy to Dose\" consult for Vancomycin for the treatment of endocarditis. Current end date: 8-18-21     Objective:  Ht: 195.6 cm (77\"); Wt: 95.3 kg (210 lb)  Estimated Creatinine Clearance: 130.8 mL/min (by C-G formula based on SCr of 0.84 mg/dL).     Creatinine   Date Value Ref Range Status   08/10/2021 0.84 0.76 - 1.27 mg/dL Final   08/06/2021 0.85 0.76 - 1.27 mg/dL Final   08/02/2021 1 0.5 - 1.2 mg/dL Final   06/01/2021 0.9 0.5 - 1.2 mg/dL Final   05/27/2021 0.9 0.5 - 1.2 mg/dL Final      Lab Results   Component Value Date    WBC 8.53 08/10/2021    WBC 10.94 (H) 08/06/2021    WBC 5.6 05/27/2021         Culture Results:  Microbiology Results (last 10 days)       Procedure Component Value - Date/Time    COVID-19,[facility] IN-HOUSE,Nasal Swab No Transport Media 3-4 HR TAT - Swab, Nasal Cavity [[id number]]  (Normal) Collected: 08/10/21 1009    Lab Status: Final result Specimen: Swab from Nasal Cavity Updated: 08/10/21 1109     COVID19 Not Detected    Narrative:      Fact sheet for providers: https://www.fda.gov/media/293666/download     Fact sheet for patients: https://www.fda.gov/media/149414/download    Test performed by PCR.    Consider negative results in combination with clinical observations, patient history, and epidemiological information.  Fact sheet for providers: https://www.fda.gov/media/602025/download     Fact sheet for " patients: https://www.fda.gov/media/513536/download    Test performed by PCR.    Consider negative results in combination with clinical observations, patient history, and epidemiological information.            Juan Schwab, PharmD   08/12/21 10:04 CDT

## 2021-08-13 LAB
ALBUMIN SERPL-MCNC: 3 G/DL (ref 3.5–5.2)
ALBUMIN/GLOB SERPL: 0.8 G/DL
ALP SERPL-CCNC: 85 U/L (ref 39–117)
ALT SERPL W P-5'-P-CCNC: 30 U/L (ref 1–41)
ANION GAP SERPL CALCULATED.3IONS-SCNC: 8 MMOL/L (ref 5–15)
AST SERPL-CCNC: 41 U/L (ref 1–40)
BACTERIA BLD CULT: ABNORMAL
BILIRUB SERPL-MCNC: 0.4 MG/DL (ref 0–1.2)
BOTTLE TYPE: ABNORMAL
BUN SERPL-MCNC: 9 MG/DL (ref 6–20)
BUN/CREAT SERPL: 11.3 (ref 7–25)
CALCIUM SPEC-SCNC: 8.9 MG/DL (ref 8.6–10.5)
CHLORIDE SERPL-SCNC: 102 MMOL/L (ref 98–107)
CO2 SERPL-SCNC: 25 MMOL/L (ref 22–29)
CREAT SERPL-MCNC: 0.8 MG/DL (ref 0.76–1.27)
CRP SERPL-MCNC: 5.29 MG/DL (ref 0–0.5)
DEPRECATED RDW RBC AUTO: 38.9 FL (ref 37–54)
ERYTHROCYTE [DISTWIDTH] IN BLOOD BY AUTOMATED COUNT: 12.1 % (ref 12.3–15.4)
ERYTHROCYTE [SEDIMENTATION RATE] IN BLOOD: 41 MM/HR (ref 0–15)
GFR SERPL CREATININE-BSD FRML MDRD: 100 ML/MIN/1.73
GLOBULIN UR ELPH-MCNC: 3.9 GM/DL
GLUCOSE SERPL-MCNC: 121 MG/DL (ref 65–99)
HCT VFR BLD AUTO: 31.5 % (ref 37.5–51)
HGB BLD-MCNC: 10.3 G/DL (ref 13–17.7)
MCH RBC QN AUTO: 28.5 PG (ref 26.6–33)
MCHC RBC AUTO-ENTMCNC: 32.7 G/DL (ref 31.5–35.7)
MCV RBC AUTO: 87.3 FL (ref 79–97)
PLATELET # BLD AUTO: 319 10*3/MM3 (ref 140–450)
PMV BLD AUTO: 10.6 FL (ref 6–12)
POTASSIUM SERPL-SCNC: 3.9 MMOL/L (ref 3.5–5.2)
PROT SERPL-MCNC: 6.9 G/DL (ref 6–8.5)
RBC # BLD AUTO: 3.61 10*6/MM3 (ref 4.14–5.8)
SODIUM SERPL-SCNC: 135 MMOL/L (ref 136–145)
WBC # BLD AUTO: 9.69 10*3/MM3 (ref 3.4–10.8)

## 2021-08-13 PROCEDURE — 85651 RBC SED RATE NONAUTOMATED: CPT | Performed by: INTERNAL MEDICINE

## 2021-08-13 PROCEDURE — 99233 SBSQ HOSP IP/OBS HIGH 50: CPT | Performed by: PSYCHIATRY & NEUROLOGY

## 2021-08-13 PROCEDURE — 25010000002 MEROPENEM PER 100 MG: Performed by: INTERNAL MEDICINE

## 2021-08-13 PROCEDURE — 99232 SBSQ HOSP IP/OBS MODERATE 35: CPT | Performed by: NURSE PRACTITIONER

## 2021-08-13 PROCEDURE — 87471 BARTONELLA DNA AMP PROBE: CPT | Performed by: INTERNAL MEDICINE

## 2021-08-13 PROCEDURE — 25010000002 VANCOMYCIN 10 G RECONSTITUTED SOLUTION: Performed by: PSYCHIATRY & NEUROLOGY

## 2021-08-13 PROCEDURE — 86611 BARTONELLA ANTIBODY: CPT | Performed by: INTERNAL MEDICINE

## 2021-08-13 PROCEDURE — 87040 BLOOD CULTURE FOR BACTERIA: CPT | Performed by: INTERNAL MEDICINE

## 2021-08-13 PROCEDURE — 87798 DETECT AGENT NOS DNA AMP: CPT | Performed by: INTERNAL MEDICINE

## 2021-08-13 PROCEDURE — 86668 FRANCISELLA TULARENSIS: CPT | Performed by: INTERNAL MEDICINE

## 2021-08-13 PROCEDURE — 99231 SBSQ HOSP IP/OBS SF/LOW 25: CPT | Performed by: FAMILY MEDICINE

## 2021-08-13 PROCEDURE — 80053 COMPREHEN METABOLIC PANEL: CPT | Performed by: PSYCHIATRY & NEUROLOGY

## 2021-08-13 PROCEDURE — C1751 CATH, INF, PER/CENT/MIDLINE: HCPCS

## 2021-08-13 PROCEDURE — 87077 CULTURE AEROBIC IDENTIFY: CPT | Performed by: INTERNAL MEDICINE

## 2021-08-13 PROCEDURE — 85027 COMPLETE CBC AUTOMATED: CPT | Performed by: PSYCHIATRY & NEUROLOGY

## 2021-08-13 PROCEDURE — 25010000002 CEFTRIAXONE PER 250 MG: Performed by: INTERNAL MEDICINE

## 2021-08-13 PROCEDURE — 02HV33Z INSERTION OF INFUSION DEVICE INTO SUPERIOR VENA CAVA, PERCUTANEOUS APPROACH: ICD-10-PCS | Performed by: INTERNAL MEDICINE

## 2021-08-13 PROCEDURE — 86140 C-REACTIVE PROTEIN: CPT | Performed by: INTERNAL MEDICINE

## 2021-08-13 RX ORDER — ATORVASTATIN CALCIUM 20 MG/1
20 TABLET, FILM COATED ORAL DAILY
COMMUNITY
Start: 2021-08-01 | End: 2021-10-06

## 2021-08-13 RX ORDER — LIDOCAINE HYDROCHLORIDE 10 MG/ML
1 INJECTION, SOLUTION EPIDURAL; INFILTRATION; INTRACAUDAL; PERINEURAL ONCE
Status: COMPLETED | OUTPATIENT
Start: 2021-08-13 | End: 2021-08-13

## 2021-08-13 RX ORDER — ACETAMINOPHEN 325 MG/1
650 TABLET ORAL EVERY 6 HOURS PRN
Status: DISCONTINUED | OUTPATIENT
Start: 2021-08-13 | End: 2021-08-21 | Stop reason: HOSPADM

## 2021-08-13 RX ORDER — METOPROLOL SUCCINATE 25 MG/1
12.5 TABLET, EXTENDED RELEASE ORAL DAILY
COMMUNITY
Start: 2021-08-01 | End: 2021-09-07

## 2021-08-13 RX ORDER — TAMSULOSIN HYDROCHLORIDE 0.4 MG/1
1 CAPSULE ORAL DAILY
COMMUNITY
End: 2021-08-26

## 2021-08-13 RX ADMIN — VANCOMYCIN HYDROCHLORIDE 1500 MG: 10 INJECTION, POWDER, LYOPHILIZED, FOR SOLUTION INTRAVENOUS at 14:48

## 2021-08-13 RX ADMIN — SODIUM CHLORIDE, PRESERVATIVE FREE 10 ML: 5 INJECTION INTRAVENOUS at 09:52

## 2021-08-13 RX ADMIN — SODIUM CHLORIDE, PRESERVATIVE FREE 10 ML: 5 INJECTION INTRAVENOUS at 22:41

## 2021-08-13 RX ADMIN — CLOPIDOGREL 75 MG: 75 TABLET, FILM COATED ORAL at 09:54

## 2021-08-13 RX ADMIN — MEROPENEM 1 G: 1 INJECTION, POWDER, FOR SOLUTION INTRAVENOUS at 09:51

## 2021-08-13 RX ADMIN — LIDOCAINE HYDROCHLORIDE ANHYDROUS 1 ML: 10 INJECTION, SOLUTION INFILTRATION at 12:13

## 2021-08-13 RX ADMIN — ACETAMINOPHEN 650 MG: 325 TABLET, FILM COATED ORAL at 14:49

## 2021-08-13 RX ADMIN — LOSARTAN POTASSIUM 12.5 MG: 25 TABLET, FILM COATED ORAL at 09:51

## 2021-08-13 RX ADMIN — ATORVASTATIN CALCIUM 40 MG: 40 TABLET, FILM COATED ORAL at 09:53

## 2021-08-13 RX ADMIN — CEFTRIAXONE 2 G: 2 INJECTION, POWDER, FOR SOLUTION INTRAMUSCULAR; INTRAVENOUS at 22:34

## 2021-08-13 RX ADMIN — ASPIRIN 81 MG: 81 TABLET ORAL at 09:53

## 2021-08-13 RX ADMIN — METOPROLOL SUCCINATE 25 MG: 25 TABLET, FILM COATED, EXTENDED RELEASE ORAL at 09:53

## 2021-08-13 RX ADMIN — TAMSULOSIN HYDROCHLORIDE 0.4 MG: 0.4 CAPSULE ORAL at 09:54
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
## 2021-08-13 NOTE — PLAN OF CARE
Goal Outcome Evaluation:              Outcome Summary: Pt c/o headache during shift, tylenol given. Pt had a R basilic PICC line placed today. Pt is receiving merrem and vancomycin. S 82-98 PVC, COUP on tele. Pt is A/O, pt states feeling better today than yesterday. Safety maintained, will continue to monitor.

## 2021-08-13 NOTE — PROGRESS NOTES
"CC:\"im feeling better\"--he states he feels less confused    Review of Systems   Constitutional: Positive for activity change and fatigue.   HENT: Negative.    Eyes: Negative.    Respiratory: Negative.    Cardiovascular: Negative.    Gastrointestinal: Negative.    Endocrine: Negative.    Genitourinary: Negative.    Musculoskeletal: Negative.    Skin: Negative.    Allergic/Immunologic: Negative.    Neurological: Negative.    Hematological: Negative.    Psychiatric/Behavioral: Positive for confusion.     Temp:  [99 °F (37.2 °C)-99.8 °F (37.7 °C)] 99.6 °F (37.6 °C)  Heart Rate:  [87-97] 87  Resp:  [16-18] 16  BP: (102-116)/(57-68) 116/60  I/O last 3 completed shifts:  In: 360 [P.O.:360]  Out: 1125 [Urine:1125]  I/O this shift:  In: 240 [P.O.:240]  Out: 1825 [Urine:1825]    Physical Exam  Vitals and nursing note reviewed.   HENT:      Head: Normocephalic and atraumatic.      Nose: Nose normal.      Mouth/Throat:      Mouth: Mucous membranes are dry.      Pharynx: Oropharynx is clear.   Eyes:      Extraocular Movements: Extraocular movements intact.      Conjunctiva/sclera: Conjunctivae normal.      Pupils: Pupils are equal, round, and reactive to light.   Cardiovascular:      Rate and Rhythm: Normal rate and regular rhythm.      Pulses: Normal pulses.      Heart sounds: Normal heart sounds.   Pulmonary:      Effort: Pulmonary effort is normal.      Breath sounds: Normal breath sounds.   Abdominal:      General: Abdomen is flat. Bowel sounds are normal.      Palpations: Abdomen is soft.   Musculoskeletal:         General: Normal range of motion.      Cervical back: Normal range of motion and neck supple.   Skin:     General: Skin is warm.      Capillary Refill: Capillary refill takes less than 2 seconds.   Neurological:      General: No focal deficit present.      Mental Status: He is alert and oriented to person, place, and time. Mental status is at baseline.   Psychiatric:         Mood and Affect: Mood normal.         " Behavior: Behavior normal.         Thought Content: Thought content normal.         Judgment: Judgment normal.           Elevated troponin    Confusion    Numbness of tongue    Acute bacterial endocarditis    Cerebrovascular accident (CVA) due to embolism of cerebral artery (CMS/[facility])    D/w dr gray--the patient was in the ed last week with dvt---repeat venous scan is neg for clot---continue antbx and care as per consultants

## 2021-08-13 NOTE — PAYOR COMM NOTE
"[id number]  8/12 CLINICAL UPDATE  UR  [phone]    Robert Piedra (56 y.o. Male)     Date of Birth Social Security Number Address Home Phone MRN    1965  [address] [place] [id number] [phone] [id number]    [employer] Status          [employer]        Admission Date Admission Type Admitting Provider Attending Provider Department, Room/Bed    8/10/21 Emergency Shad Curiel MD [place], Thomas Waldon, MD [facility] 4B, 448/1    Discharge Date Discharge Disposition Discharge Destination                       Attending Provider: Shad Curiel MD    Allergies: Penicillins    Isolation: None   Infection: None   Code Status: CPR    Ht: 195.6 cm (77\")   Wt: 97.4 kg (214 lb 12.8 oz)    Admission Cmt: None   Principal Problem: None                Active Insurance as of 8/10/2021     Primary Coverage     Payor Plan Insurance Group Employer/Plan Group    [facility] [employer] BLUE [facility] PPO [id number]     Payor Plan Address Payor Plan Phone Number Payor Plan Fax Number Effective Dates    [address] [phone]  12/24/2012 - None Entered    [place] [id number]       Subscriber Name Subscriber Birth Date Member ID       ROBERT PIEDRA 1965 [id number]                 Emergency Contacts      (Rel.) Home Phone Work Phone Mobile Phone    Amparo Piedra (Spouse) [phone] -- [phone]            Vital Signs (last day)     Date/Time   Temp   Temp src   Pulse   Resp   BP   Patient Position   SpO2    08/13/21 0758   99 (37.2)   Oral   91   19   110/59   --   98    08/13/21 0400   99.6 (37.6)   Oral   87   16   116/60   Lying   96    08/12/21 2358   99.6 (37.6)   Oral   97   16   106/61   Lying   99    08/12/21 2029   99.8 (37.7)   Oral   92   18   104/57   Lying   97    08/12/21 1543   99.3 (37.4)   Oral   90   18   102/68   Lying   99    08/12/21 1210   99 (37.2)   Oral   88   18   113/64   Lying   97    08/12/21 0814   99 (37.2)   " Oral   90   16   111/65   Lying   95    08/12/21 0311   99.3 (37.4)   Oral   99   16   109/65   Lying   96              Oxygen Therapy (last day)     Date/Time   SpO2   Device (Oxygen Therapy)   Flow (L/min)   Oxygen Concentration (%)   ETCO2 (mmHg)    08/13/21 0758   98   room air   --   --   --    08/13/21 0400   96   room air   --   --   --    08/12/21 2358   99   room air   --   --   --    08/12/21 2029   97   room air   --   --   --    08/12/21 2000   --   room air   --   --   --    08/12/21 1543   99   room air   --   --   --    08/12/21 1210   97   room air   --   --   --    08/12/21 1000   --   room air   --   --   --    08/12/21 0814   95   room air   --   --   --    08/12/21 0311   96   room air   --   --   --              Intake & Output (last day)       08/12 0701 - 08/13 0700 08/13 0701 - 08/14 0700    P.O. 240     Total Intake(mL/kg) 240 (2.5)     Urine (mL/kg/hr) 1825 (0.8)     Total Output 1825     Net -1585               Lines, Drains & Airways    Active LDAs     Name:   Placement date:   Placement time:   Site:   Days:    Peripheral IV 08/12/21 1914 Anterior;Right;Upper Arm   08/12/21 1914    Arm   less than 1                Current Facility-Administered Medications   Medication Dose Route Frequency Provider Last Rate Last Admin   • aspirin EC tablet 81 mg  81 mg Oral Daily Knees, Malgorzata E, APRN   81 mg at 08/12/21 1325   • atorvastatin (LIPITOR) tablet 40 mg  40 mg Oral Daily Knees, Malgorzata E, APRN   40 mg at 08/12/21 1111   • clopidogrel (PLAVIX) tablet 75 mg  75 mg Oral Daily Shad Curiel MD   75 mg at 08/12/21 1111   • cyclobenzaprine (FLEXERIL) tablet 5 mg  5 mg Oral TID PRN Shad Curiel MD       • HYDROcodone-acetaminophen (NORCO) 5-325 MG per tablet 1 tablet  1 tablet Oral Q6H PRN Shad Curiel MD       • losartan (COZAAR) tablet 12.5 mg  12.5 mg Oral Q24H Abby, Malgorzata LAZCANO APRN   12.5 mg at 08/12/21 1324   • meropenem (MERREM) 1 g/100 mL 0.9% NS VTB (mbp)  1 g  Intravenous Once Elliott Rock MD       • meropenem (MERREM) 1 g/100 mL 0.9% NS VTB (mbp)  1 g Intravenous Q8H Elliott Rock MD       • metoprolol succinate XL (TOPROL-XL) 24 hr tablet 25 mg  25 mg Oral Q24H Malgorzata Mora, APRN   25 mg at 08/12/21 1110   • sodium chloride 0.9 % flush 10 mL  10 mL Intravenous PRN Shad Curiel MD       • sodium chloride 0.9 % flush 10 mL  10 mL Intravenous Q12H Shad Curiel MD   10 mL at 08/12/21 2003   • sodium chloride 0.9 % flush 10 mL  10 mL Intravenous PRN Shad Curiel MD       • tamsulosin (FLOMAX) 24 hr capsule 0.4 mg  0.4 mg Oral Daily Shad Curiel MD   0.4 mg at 08/12/21 1111   • vancomycin 1500 mg/500 mL 0.9% NS IVPB ([facility])  15 mg/kg Intravenous Q12H Angelo Johnson MD   1,500 mg at 08/12/21 2357     Lab Results (last 24 hours)     Procedure Component Value Units Date/Time    Sedimentation Rate [[id number]]  (Abnormal) Collected: 08/13/21 0515    Specimen: Blood Updated: 08/13/21 0813     Sed Rate 41 mm/hr     C-reactive Protein [[id number]] Collected: 08/13/21 0515    Specimen: Blood Updated: 08/13/21 0808    Comprehensive Metabolic Panel [[id number]]  (Abnormal) Collected: 08/13/21 0515    Specimen: Blood Updated: 08/13/21 0625     Glucose 121 mg/dL      BUN 9 mg/dL      Creatinine 0.80 mg/dL      Sodium 135 mmol/L      Potassium 3.9 mmol/L      Chloride 102 mmol/L      CO2 25.0 mmol/L      Calcium 8.9 mg/dL      Total Protein 6.9 g/dL      Albumin 3.00 g/dL      ALT (SGPT) 30 U/L      AST (SGOT) 41 U/L      Alkaline Phosphatase 85 U/L      Total Bilirubin 0.4 mg/dL      eGFR Non African Amer 100 mL/min/1.73      Globulin 3.9 gm/dL      A/G Ratio 0.8 g/dL      BUN/Creatinine Ratio 11.3     Anion Gap 8.0 mmol/L     Narrative:      GFR Normal >60  Chronic Kidney Disease <60  Kidney Failure <15      CBC (No Diff) [[id number]]  (Abnormal) Collected: 08/13/21 0515    Specimen: Blood Updated: 08/13/21 0600     WBC 9.69 10*3/mm3      RBC  3.61 10*6/mm3      Hemoglobin 10.3 g/dL      Hematocrit 31.5 %      MCV 87.3 fL      MCH 28.5 pg      MCHC 32.7 g/dL      RDW 12.1 %      RDW-SD 38.9 fl      MPV 10.6 fL      Platelets 319 10*3/mm3         Imaging Results (Last 24 Hours)     Procedure Component Value Units Date/Time    US Venous Doppler Lower Extremity Bilateral (duplex) [[id number]] Collected: 08/12/21 1629     Updated: 08/12/21 1632    Narrative:      History: Swelling       Impression:      Impression: There is no evidence of deep venous thrombosis or  superficial thrombophlebitis of right or left lower extremities.     Comments: Bilateral lower extremity venous duplex exam was performed  using color Doppler flow, Doppler waveform analysis, and grayscale  imaging, with and without compression. There is no evidence of deep  venous thrombosis in the common femoral, superficial femoral, popliteal,  peroneal, anterior tibial, and posterior tibial veins bilaterally. No  thrombus is identified in the saphenofemoral junctions and greater  saphenous veins bilaterally.         This report was finalized on 08/12/2021 16:29 by Dr. Truong Smith MD.    US Carotid Bilateral [[id number]] Collected: 08/12/21 1615     Updated: 08/12/21 1619    Narrative:      History: Stroke       Impression:      Impression:  1. There is less than 50% stenosis of the right internal carotid artery.  2. There is less than 50% stenosis of the left internal carotid artery.  3. Antegrade flow is demonstrated in bilateral vertebral arteries.     Comments: Bilateral carotid vertebral arterial duplex scan was  performed.     Grayscale imaging shows intimal thickening and calcified elements at the  carotid bifurcation. The right internal carotid artery peak systolic  velocity is 104.4 cm/sec. The end-diastolic velocity is 30.5 cm/sec. The  right ICA/CCA ratio is approximately 1.0 . These findings correlate with  less than 50% stenosis of the right internal carotid artery.      Grayscale imaging shows intimal thickening and calcified elements at the  carotid bifurcation. The left internal carotid artery peak systolic  velocity is 109.3 cm/sec. The end-diastolic velocity is 43.3 cm/sec. The  left ICA/CCA ratio is approximately 1.1 . These findings correlate with  less than 50% stenosis of the left internal carotid artery.     Antegrade flow is demonstrated in bilateral vertebral arteries.     This report was finalized on 08/12/2021 16:16 by Dr. Truong Smith MD.        Orders (last 24 hrs)      Start     Ordered    08/13/21 1500  meropenem (MERREM) 1 g/100 mL 0.9% NS VTB (mbp)  Every 8 Hours      08/13/21 0804    08/13/21 0900  meropenem (MERREM) 1 g/100 mL 0.9% NS VTB (mbp)  Once      08/13/21 0804    08/13/21 0845  meropenem (MERREM) 1 g/100 mL 0.9% NS VTB (mbp)  Every 8 Hours,   Status:  Discontinued      08/13/21 0759    08/13/21 0801  C-reactive Protein  Once      08/13/21 0800    08/13/21 0801  Sedimentation Rate  Once      08/13/21 0800    08/13/21 0801  Tularemia Antibody  Once      08/13/21 0800    08/13/21 0759  Blood Culture With DEBBIE - Blood,  Once      08/13/21 0758    08/13/21 0759  Bartonella Antibody Panel  Once      08/13/21 0759    08/13/21 0759  Bartonella, DNA, Amp Probe  Once      08/13/21 0759    08/13/21 0759  Tropheryma Whipplei By Rapid PCR - Cerebrospinal Fluid, Lumbar Puncture  Once      08/13/21 0759    08/13/21 0758  Blood Culture With DEBBIE - Blood,  Once      08/13/21 0758    08/13/21 0600  Comprehensive Metabolic Panel  Morning Draw      08/12/21 0953    08/12/21 2300  vancomycin 1500 mg/500 mL 0.9% NS IVPB ([facility])  Every 12 Hours      08/12/21 1001    08/12/21 1410  Inpatient Cardiothoracic Surgery Consult  Once     Specialty:  Cardiothoracic Surgery  Provider:  Juan Goldstein MD    08/12/21 1410    08/12/21 1408  US Venous Doppler Lower Extremity Bilateral (duplex)  1 Time Imaging      08/12/21 1407    08/12/21 1345  aspirin EC tablet 81 mg  Daily       21 1248    21 1345  losartan (COZAAR) tablet 12.5 mg  Every 24 Hours Scheduled      21 1248    21 1225  Diet Regular; Cardiac  Diet Effective Now      21 1224    21 1200  vancomycin 2000 mg/500 mL 0.9% NS IVPB ([facility])  Once      21 1001    21 1147  OT Plan of Care Cert / Re-Cert  Once     Comments: Occupational Therapy Plan of Care  Initial Certification  Certification Period: 2021 - 11/10/2021    Patient Name: Robert Piedra  : 1965    (R77.8) Elevated troponin  (primary encounter diagnosis)  (R41.0) Confusion  (K76.9) Liver lesion  (D73.89) Splenic lesion  (Z78.9) Decreased activities of daily living (ADL)                Assessment  OT Assessment  Criteria for Skilled Therapeutic Interventions Met (OT): no, skilled treatment is necessary                  Plan    OT Plan  Therapy Frequency (OT): evaluation only  Outcome Summary: OT eval completed. Pt is alert and oriented x4, sitting up in bed in no apparent distress upon entry to room. He was able to complete bed mobility, sit <> stand t/fs, all functional mobility, LBD and self-feeding skills independently. B UE and B LE strength 5/5. GMC/FMC B intact. OT to sign off at this time as he does not display deficit which require skilled OT services at this time. Please reconsult if there is a change in status. Anticipate d/c home.      Cindi Rivers, OTR/L  2021        By cosigning this order, either electronically or physically, I certify that the therapy services are furnished while this patient is under my care, the services outline above are required by this patient, and will be reviewed every 90 days.        M.D.:__________________________________________ Date: ______________    21 1147    21 1013  CBC & Differential  Once      21 1012    21 1013  Comprehensive Metabolic Panel  Once      21 1012    21 1013  CBC Auto Differential  PROCEDURE ONCE      21 1012     08/12/21 0954  CBC (No Diff)  Daily      08/12/21 0953    08/12/21 0950  Pharmacy to dose vancomycin  Continuous PRN,   Status:  Discontinued      08/12/21 0950    08/12/21 0947  Pharmacy to dose vancomycin  Continuous PRN,   Status:  Discontinued      08/12/21 0947    08/12/21 0940  Inpatient Infectious Diseases Consult  Once     Specialty:  Infectious Diseases  Provider:  Hodan Cardoza MD    08/12/21 0939    08/12/21 0929  Blood Culture - Blood, Arm, Right  Once      08/12/21 0928    08/12/21 0929  Blood Culture - Blood, Arm, Left  Once     Comments: From a different site than #1.      08/12/21 0928    08/12/21 0900  metoprolol succinate XL (TOPROL-XL) 24 hr tablet 25 mg  Every 24 Hours Scheduled      08/11/21 1010    08/12/21 0900  atorvastatin (LIPITOR) tablet 40 mg  Daily      08/11/21 1047    08/11/21 0900  clopidogrel (PLAVIX) tablet 75 mg  Daily      08/10/21 1740    08/11/21 0900  tamsulosin (FLOMAX) 24 hr capsule 0.4 mg  Daily      08/10/21 1740    08/10/21 2100  sodium chloride 0.9 % flush 10 mL  Every 12 Hours Scheduled      08/10/21 1740    08/10/21 2100  enoxaparin (LOVENOX) syringe 100 mg  Every 12 Hours Scheduled,   Status:  Discontinued      08/10/21 1936    08/10/21 2000  Vital Signs  Every 4 Hours      08/10/21 1740    08/10/21 2000  Neuro Checks  Every 4 Hours      08/10/21 1745    08/10/21 1740  Intake & Output  Every Shift      08/10/21 1740    08/10/21 1739  sodium chloride 0.9 % flush 10 mL  As Needed      08/10/21 1740    08/10/21 1738  HYDROcodone-acetaminophen (NORCO) 5-325 MG per tablet 1 tablet  Every 6 Hours PRN      08/10/21 1740    08/10/21 1738  cyclobenzaprine (FLEXERIL) tablet 5 mg  3 Times Daily PRN      08/10/21 1740    08/10/21 1000  sodium chloride 0.9 % flush 10 mL  As Needed      08/10/21 1000    Unscheduled  ECG 12 Lead  As Needed     Comments: Nurse to Release ECG Order for Unrelieved or New Chest Pain    08/10/21 2107    --  clopidogrel (PLAVIX) 75 MG tablet   "Daily      08/10/21 1724    --  SCANNED - TELEMETRY        08/10/21 0000    --  SCANNED EKG      08/10/21 0000    --  SCANNED EKG      08/10/21 0000    --  SCANNED EKG      08/10/21 0000    --  SCANNED - TELEMETRY        08/10/21 0000    --  SCANNED - TELEMETRY        08/10/21 0000                   Physician Progress Notes (last 24 hours) (Notes from 08/12/21 0816 through 08/13/21 0816)      Shad Curiel MD at 08/12/21 1723        CC:\"im feeling better\"--he states he feels less confused    Review of Systems   Constitutional: Positive for activity change and fatigue.   HENT: Negative.    Eyes: Negative.    Respiratory: Negative.    Cardiovascular: Negative.    Gastrointestinal: Negative.    Endocrine: Negative.    Genitourinary: Negative.    Musculoskeletal: Negative.    Skin: Negative.    Allergic/Immunologic: Negative.    Neurological: Negative.    Hematological: Negative.    Psychiatric/Behavioral: Positive for confusion.     Temp:  [99 °F (37.2 °C)-99.8 °F (37.7 °C)] 99.6 °F (37.6 °C)  Heart Rate:  [87-97] 87  Resp:  [16-18] 16  BP: (102-116)/(57-68) 116/60  I/O last 3 completed shifts:  In: 360 [P.O.:360]  Out: 1125 [Urine:1125]  I/O this shift:  In: 240 [P.O.:240]  Out: 1825 [Urine:1825]    Physical Exam  Vitals and nursing note reviewed.   HENT:      Head: Normocephalic and atraumatic.      Nose: Nose normal.      Mouth/Throat:      Mouth: Mucous membranes are dry.      Pharynx: Oropharynx is clear.   Eyes:      Extraocular Movements: Extraocular movements intact.      Conjunctiva/sclera: Conjunctivae normal.      Pupils: Pupils are equal, round, and reactive to light.   Cardiovascular:      Rate and Rhythm: Normal rate and regular rhythm.      Pulses: Normal pulses.      Heart sounds: Normal heart sounds.   Pulmonary:      Effort: Pulmonary effort is normal.      Breath sounds: Normal breath sounds.   Abdominal:      General: Abdomen is flat. Bowel sounds are normal.      Palpations: Abdomen is " soft.   Musculoskeletal:         General: Normal range of motion.      Cervical back: Normal range of motion and neck supple.   Skin:     General: Skin is warm.      Capillary Refill: Capillary refill takes less than 2 seconds.   Neurological:      General: No focal deficit present.      Mental Status: He is alert and oriented to person, place, and time. Mental status is at baseline.   Psychiatric:         Mood and Affect: Mood normal.         Behavior: Behavior normal.         Thought Content: Thought content normal.         Judgment: Judgment normal.           Elevated troponin    Confusion    Numbness of tongue    Acute bacterial endocarditis    Cerebrovascular accident (CVA) due to embolism of cerebral artery (CMS/[facility])    D/w dr gray--the patient was in the ed last week with dvt---repeat venous scan is neg for clot---continue antbx and care as per consultants      Electronically signed by Shad Curiel MD at 08/13/21 0703     Malgorzata Mora APRN at 08/12/21 1033     Attestation signed by Jorge Owusu MD at 08/12/21 7362    I evaluated the documentation provided below by LUDWIG Contreras and agree.  I evaluated the patient and performed a physical examination.    Patient states he feels the same. No new neuro symptoms.  We spent more time discussing the chronology of his cardiac procedures/complaints dating back to '19, and he does report some mild improvement after MVR in Aug '19, but states that 'plateaued' rather quickly and he's noted progressive decline in functional status for last several months.  Notes severe SOA with minimal activity (just walking from bed to bathroom) for the last 3 weeks, along with associated orthopnea. Has not noticed swelling, weight gain, or volume overload. Does endorse almost-nightly fever for last ~2 weeks at home, citing initially measured temp at home was 102 degrees F.    Exam  Vitals reviewed  Gen: NAD  CV: rrr, no m/r/g  Lungs: nl effort, CTA B  Ext: warm, no  edema    Labs reviewed: wbc 9.8, hgb 11.0, cmp unremarkable except Na mildly low at 134.  Inflammatory markers from yesterday elevated (crp 6.38, esr 26)    Telemetry reviewed: nsr, no arrhythmias    Results for orders placed during the hospital encounter of 08/10/21    Adult Transthoracic Echo Complete W/ Cont if Necessary Per Protocol    Interpretation Summary  · Left ventricular ejection fraction appears to be 31 - 35%. Left ventricular systolic function is moderately decreased.  · The left ventricular cavity is mildly dilated.  · Estimated right ventricular systolic pressure from tricuspid regurgitation is normal (<35 mmHg).  · Normal size and function of the right ventricle.  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  · Saline test results are negative.      A/P: I agree with the problems listed and plans provided by LUDWIG Contreras, with the following additions/alterations:    1. Mitral valve vegetation: Patient's clinical sore over the last few weeks certainly is suspicious for infectious endocarditis, but no definitive proof as of yet of infection.  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  -ID consult pending  -IV vancomycin empirically started  -With evidence of systemic embolism, both to the brain with multiembolic infarcts and splenic infarcts, may be considered for mitral valve surgery.  Discussed options with patient and placed inpatient consultation for CT surgery.      2.  Chronic systolic heart failure, stage C, NYHA III: Euvolemic on exam.  Patient does report significant lifestyle limiting symptoms  of dyspnea that is gradually worsened over the last several months.  -We have already transitioned his beta-blocker to a CHF-appropriate one of the form of Toprol-XL  -Agree with Malgorzata's plan to initiate low-dose ARB and closely follow blood pressure  -Also consider other guideline directed medical therapies prior to or upon discharge (including SGLT2 inhibitors and Aldactone, depending on how blood pressure does)  -Monitor volume status closely continue holding Lasix for now; continue daily weights     3.  Coronary artery disease: Very unusual chronology of events leading to placement of 3 drug-eluting stents in the right coronary artery on 5/27/2021 by Dr. Hernandez.  Patient was not having angina and then has not had any angina since.  His cardiomyopathy certainly appears to be nonischemic as the dysfunction is global, and was also reported to have nonobstructive coronary disease on catheterization prior to mitral valve surgery at [facility] in August 2020.  Regardless, drug-eluting stents were placed 2.5 months ago, so patient is not able to be anticoagulated in light of #4 below, then certainly the safest approach from a coronary protection standpoint would be to continue to antiplatelet drugs.  He has been on Plavix so we will recommend initiating 81 mg of aspirin daily.  -I did review Dr. Corrales's notes regarding this, and will discuss directly with him as well    4.  Multiple embolic strokes: Most likely secondary to #1 above.  Off anticoagulation now to prevent hemorrhagic conversion.    5.  History of atrial arrhythmias: Has remained in sinus rhythm throughout the stay.  -No anticoagulation currently in light of #4 above  -Continue beta-blocker as per above    MDM: high complexity  Billed based on timing - I spent 55 minutes in the room with patient and his wife    Electronically signed by Jorge Owusu MD, 08/12/21, 2:22 PM CDT.                      [facility] -  Progress Note      LOS: 2 days   Patient Care Team:  Shad Curiel MD as PCP - General (Family Medicine)  William Gupta MD Knox, Michael Landers, MD as Consulting Physician (Urology)    Chief Complaint: follow up mitral valve endocarditis, acute strokes, also with CAD, CHF history     Subjective     Interval History:     Patient Complaints: The patient is currently resting comfortably in bed.  He denies any chest pain, shortness of breath, orthopnea, PND, palpitations.  He admits some weakness and fatigue.  Did have a temperature of 99.1 last night.  He reports he is having a little trouble processing things, and is somewhat slow to respond verbally in certain situations but he denies any speech difficulties.    MRI revealed multiple small embolic strokes in multiple vessel distributions.  Echo also reveals a small, nonmobile mitral valve mass consistent with a vegetation.  Per my discussion with neurology, his embolic strokes are likely secondary to his mitral valve endocarditis.  Dr. Johnson has ordered blood cultures and initiated IV vancomycin today and consulted ID.    He remains in normal sinus rhythm/sinus tachycardia with heart rates in the 80s to low 100s with frequent PVCs.    He remains without his usual Lasix 20 mg daily p.o. at home, but does not exhibit signs of volume overload and appears to be net negative around 700 cc this admission.        Review of Systems:     Review of Systems   Constitutional: Positive for fatigue. Negative for diaphoresis, fever and unexpected weight change.   HENT: Negative for nosebleeds.    Respiratory: Negative for apnea, cough, chest tightness, shortness of breath and wheezing.    Cardiovascular: Negative for chest pain, palpitations and leg swelling.   Gastrointestinal: Negative for abdominal distention, nausea and vomiting.   Genitourinary: Negative for hematuria.   Musculoskeletal: Negative for gait problem.   Skin: Negative for color change.   Neurological: Positive  for weakness. Negative for dizziness, syncope and light-headedness.     Objective     Vital Sign Min/Max for last 24 hours  Temp  Min: 97.9 °F (36.6 °C)  Max: 99.3 °F (37.4 °C)   BP  Min: 107/62  Max: 126/75   Pulse  Min: 84  Max: 109   Resp  Min: 16  Max: 18   SpO2  Min: 95 %  Max: 98 %   No data recorded   No data recorded         08/10/21  2009   Weight: 95.3 kg (210 lb)       Physical Exam:    Vitals and nursing note reviewed.   Constitutional:       General: Not in acute distress.     Appearance: Well-developed and not in distress. Not diaphoretic.   Neck:      Vascular: No JVD.   Pulmonary:      Effort: Pulmonary effort is normal. No respiratory distress.      Breath sounds: Normal breath sounds.   Cardiovascular:      Normal rate. Regular rhythm.      Murmurs: There is no murmur.   Edema:     Peripheral edema absent.   Abdominal:      Tenderness: There is no abdominal tenderness.   Skin: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  · Saline test results are negative.    Impression:   1.  Subacute bacterial endocarditis  2.  Multiple embolic strokes on MRI  3.  History mitral valvular repair  4.  Evidence of splenic infarct on CT  5.  Systolic congestive heart failure  6.  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  Will follow closely.    Elliott Rock MD  08/12/21  16:16 CDT            Electronically signed by Elliott Rock MD at 08/13/21 0297

## 2021-08-13 NOTE — PROGRESS NOTES
[facility] -  Progress Note     LOS: 3 days   Patient Care Team:  Shad Curiel MD as PCP - General (Family Medicine)  William Gupta MD Knox, Michael Landers, MD as Consulting Physician (Urology)    Chief Complaint: follow up mitral valve endocarditis, acute strokes, also with CAD, CHF history     Subjective     Interval History:   Overall stable. He edgardo shortness of breath, chest pain or leg swelling. Notes he is beginning to think more clearly. Up and ambulatory within his room without shortness of breath- improvement since admission. Blood cultures with growth today.     Review of Systems:     Review of Systems   Constitutional: Positive for fatigue. Negative for diaphoresis, fever and unexpected weight change.   HENT: Negative for nosebleeds.    Respiratory: Negative for apnea, cough, chest tightness, shortness of breath and wheezing.    Cardiovascular: Negative for chest pain, palpitations and leg swelling.   Gastrointestinal: Negative for abdominal distention, nausea and vomiting.   Genitourinary: Negative for hematuria.   Musculoskeletal: Negative for gait problem.   Skin: Negative for color change.   Neurological: Positive for weakness. Negative for dizziness, syncope and light-headedness.     Objective     Vital Sign Min/Max for last 24 hours  Temp  Min: 99 °F (37.2 °C)  Max: 99.8 °F (37.7 °C)   BP  Min: 104/57  Max: 116/60   Pulse  Min: 87  Max: 97   Resp  Min: 16  Max: 19   SpO2  Min: 96 %  Max: 99 %   No data recorded   Weight  Min: 97.4 kg (214 lb 12.8 oz)  Max: 97.4 kg (214 lb 12.8 oz)         08/12/21 2029   Weight: 97.4 kg (214 lb 12.8 oz)     Telemetry: NSR    Physical Exam:    Vitals and nursing note reviewed.   Constitutional:       General: Not in acute distress.     Appearance: Well-developed and not in distress. Not diaphoretic.   Neck:      Vascular: No JVD.   Pulmonary:      Effort: Pulmonary effort is normal. No respiratory distress.       Breath sounds: Normal breath sounds.   Cardiovascular:      Normal rate. Regular rhythm.      Murmurs: There is no murmur.   Edema:     Peripheral edema absent.   Abdominal:      Tenderness: There is no abdominal tenderness.   Skin:     General: Skin is warm and dry.   Neurological:      Mental Status: Alert and oriented to person, place, and time.       Results Review:   Lab Results (last 24 hours)     Procedure Component Value Units Date/Time    Blood Culture ID, PCR - Blood, Arm, Left [[id number]]  (Abnormal) Collected: 08/12/21 1111    Specimen: Blood from Arm, Left Updated: 08/13/21 1454     BCID, PCR Enterococcus spp. Identification by BCID PCR.     BOTTLE TYPE Aerobic Bottle    Blood Culture - Blood, Arm, Right [[id number]]  (Abnormal) Collected: 08/12/21 1111    Specimen: Blood from Arm, Right Updated: 08/13/21 1324     Blood Culture Abnormal Stain     Gram Stain Aerobic Bottle Gram positive cocci in chains    Blood Culture - Blood, Arm, Left [[id number]]  (Abnormal) Collected: 08/12/21 1111    Specimen: Blood from Arm, Left Updated: 08/13/21 1322     Blood Culture Abnormal Stain     Gram Stain Aerobic Bottle Gram positive cocci in chains    Tropheryma Whipplei PCR [[id number]] Collected: 08/13/21 1305    Specimen: Blood Updated: 08/13/21 1309    Blood Culture With DEBBIE - Blood, Arm, Left [[id number]] Collected: 08/13/21 0813    Specimen: Blood from Arm, Left Updated: 08/13/21 0828    Blood Culture With DEBBIE - Blood, Arm, Right [[id number]] Collected: 08/13/21 0813    Specimen: Blood from Arm, Right Updated: 08/13/21 0828    Tularemia Antibody [[id number]] Collected: 08/13/21 0813    Specimen: Blood Updated: 08/13/21 0821    Bartonella Antibody Panel [[id number]] Collected: 08/13/21 0813    Specimen: Blood Updated: 08/13/21 0821    Bartonella, DNA, Amp Probe [[id number]] Collected: 08/13/21 0813    Specimen: Blood Updated: 08/13/21 0821    C-reactive Protein [[id number]]  (Abnormal) Collected: 08/13/21 0515    Specimen:  Blood Updated: 08/13/21 0820     C-Reactive Protein 5.29 mg/dL     Sedimentation Rate [[id number]]  (Abnormal) Collected: 08/13/21 0515    Specimen: Blood Updated: 08/13/21 0813     Sed Rate 41 mm/hr     Comprehensive Metabolic Panel [[id number]]  (Abnormal) Collected: 08/13/21 0515    Specimen: Blood Updated: 08/13/21 0625     Glucose 121 mg/dL      BUN 9 mg/dL      Creatinine 0.80 mg/dL      Sodium 135 mmol/L      Potassium 3.9 mmol/L      Chloride 102 mmol/L      CO2 25.0 mmol/L      Calcium 8.9 mg/dL      Total Protein 6.9 g/dL      Albumin 3.00 g/dL      ALT (SGPT) 30 U/L      AST (SGOT) 41 U/L      Alkaline Phosphatase 85 U/L      Total Bilirubin 0.4 mg/dL      eGFR Non African Amer 100 mL/min/1.73      Globulin 3.9 gm/dL      A/G Ratio 0.8 g/dL      BUN/Creatinine Ratio 11.3     Anion Gap 8.0 mmol/L     Narrative:      GFR Normal >60  Chronic Kidney Disease <60  Kidney Failure <15      CBC (No Diff) [[id number]]  (Abnormal) Collected: 08/13/21 0515    Specimen: Blood Updated: 08/13/21 0600     WBC 9.69 10*3/mm3      RBC 3.61 10*6/mm3      Hemoglobin 10.3 g/dL      Hematocrit 31.5 %      MCV 87.3 fL      MCH 28.5 pg      MCHC 32.7 g/dL      RDW 12.1 %      RDW-SD 38.9 fl      MPV 10.6 fL      Platelets 319 10*3/mm3         Results for orders placed during the hospital encounter of 08/10/21    Adult Transthoracic Echo Complete W/ Cont if Necessary Per Protocol    Interpretation Summary  · Left ventricular ejection fraction appears to be 31 - 35%. Left ventricular systolic function is moderately decreased.  · The left ventricular cavity is mildly dilated.  · Estimated right ventricular systolic pressure from tricuspid regurgitation is normal (<35 mmHg).  · Normal size and function of the right ventricle.  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  · A 10x7 mm, small, non-mobile mitral valve mass is present on  the anterior leaflet and is consistent with a vegetation - see still-frame picture below.  · Saline test results are negative.      Medication Review: yes  Current Facility-Administered Medications   Medication Dose Route Frequency Provider Last Rate Last Admin   • acetaminophen (TYLENOL) tablet 650 mg  650 mg Oral Q6H PRN Shad Curiel MD   650 mg at 08/13/21 1449   • aspirin EC tablet 81 mg  81 mg Oral Daily Knees, Malgorzata E, APRN   81 mg at 08/13/21 0953   • atorvastatin (LIPITOR) tablet 40 mg  40 mg Oral Daily Knees, Malgorzata E, APRN   40 mg at 08/13/21 0953   • clopidogrel (PLAVIX) tablet 75 mg  75 mg Oral Daily Shad Curiel MD   75 mg at 08/13/21 0954   • cyclobenzaprine (FLEXERIL) tablet 5 mg  5 mg Oral TID PRN Shad Curiel MD       • HYDROcodone-acetaminophen (NORCO) 5-325 MG per tablet 1 tablet  1 tablet Oral Q6H PRN Shad Curiel MD       • losartan (COZAAR) tablet 12.5 mg  12.5 mg Oral Q24H Knees, Malgorzata E, APRN   12.5 mg at 08/13/21 0951   • meropenem (MERREM) 1 g/100 mL 0.9% NS VTB (mbp)  1 g Intravenous Q8H Elliott Rock MD       • metoprolol succinate XL (TOPROL-XL) 24 hr tablet 25 mg  25 mg Oral Q24H Knees, Malgorzata E, APRN   25 mg at 08/13/21 0953   • sodium chloride 0.9 % flush 10 mL  10 mL Intravenous PRN Shad Curiel MD       • sodium chloride 0.9 % flush 10 mL  10 mL Intravenous Q12H Shad Curiel MD   10 mL at 08/13/21 0952   • sodium chloride 0.9 % flush 10 mL  10 mL Intravenous PRN Shad Curiel MD       • tamsulosin (FLOMAX) 24 hr capsule 0.4 mg  0.4 mg Oral Daily Shad Curiel MD   0.4 mg at 08/13/21 0954   • vancomycin 1500 mg/500 mL 0.9% NS IVPB ([facility])  15 mg/kg Intravenous Q12H Angelo Johnson MD   1,500 mg at 08/13/21 1448   ·      Assessment/Plan     1.  Mitral valve endocarditis: small nonmobile mitral valve mass present on the anterior leaflet consistent with a vegetation.  -Cultures now growing gram positive cocci in  chains. Infectious Disease now following  -CTS now following    2.  Multiple embolic strokes, likely secondary to #1.    -Therapeutic Lovenox stopped Dr. Johnson due to excessive bleeding risk d/t strokes associated with infectious emboli.   - On Aspirin and Plavix- given recent stents at [facility]    3.  Elevated troponin without events of ACS: No further ischemic evaluation warranted.    4.  Coronary artery disease:  - The patient received 3 drug-eluting stents to the RCA on 5/27/2021 per Dr. Hernandez at [facility]. Ideally he should be on dual antiplatelet therapy   -Continue high intensity statin, beta-blocker    5.  Right lower extremity DVT- No PFO to suggest his DVT had embolization to systemic circulation leading to strokes and splenic infarcts.    5.  History of nonischemic cardiomyopathy/chronic systolic heart failure: LVEF on echo this admission is 31 to 35%, slightly decreased compared to what was reported on echo in May 2021 per Dr. Caban.  -Currently compensated/euvolemic - home dose of PO Lasix on hold  -Lopressor transitioned to Toprol-XL   -Losartan initiated yesterday  -Consider LifeVest at discharge      7.  Paroxysmal atrial fibrillation: History of ablation around November 2019.  Notes indicate he had recurrent atrial fibrillation after his ablation with associated CHF (notes from his cardiology providers indicate his nonischemic cardiomyopathy may be related to rapid atrial fibrillation).  The patient also tells me he developed atrial flutter and had frequent PVCs (25%) and had ablations for his atrial flutter and PVCs in March 2021.  He did undergo left atrial appendage ligation in August 2020 at the time of his mitral valve repair, but remained on Eliquis for anticoagulation due to YELENA incomplete closure, leading up to this admission.  -Monitor telemetry-maintaining normal sinus rhythm  -Anticoagulation discontinued by Dr. Johnson     8.    Possible splenic infarcts    9. History of severe mitral  regurgitation-August 2020 he underwent mini-mitral valve repair with left atrial appendage ligation and Reese-maze procedure. Dr. Goldstein now following     10. Bacteremia- Blood cultures with gram positive cocci in chains. Infectious Disease following.     Ar Ballard, APRN  08/13/21  16:02 CDT

## 2021-08-13 NOTE — CASE MANAGEMENT/SOCIAL WORK
Discharge Planning Assessment  [facility]     Patient Name: Robert Piedra  MRN: [id number]  Today's Date: 8/13/2021    Admit Date: 8/10/2021    Discharge Needs Assessment     Row Name 08/13/21 1507       Living Environment    Lives With  spouse    Name(s) of Who Lives With Patient  MATY    Current Living Arrangements  home/apartment/condo    Primary Care Provided by  self    Provides Primary Care For  no one    Family Caregiver if Needed  spouse    Quality of Family Relationships  helpful;involved;supportive    Able to Return to Prior Arrangements  yes       Resource/Environmental Concerns    Transportation Concerns  car, none       Transition Planning    Patient/Family Anticipates Transition to  home    Transportation Anticipated  family or friend will provide       Discharge Needs Assessment    Readmission Within the Last 30 Days  no previous admission in last 30 days    Equipment Currently Used at Home  none    Concerns to be Addressed  denies needs/concerns at this time    Equipment Needed After Discharge  none        Discharge Plan     Row Name 08/13/21 1509       Plan    Plan  HOME    Patient/Family in Agreement with Plan  yes    Plan Comments  PT. REPORTS HE LIVES INDEPENDENTLY AT HOME WITH HIS WIFE.  PT. HAS RX INS COVERAGE.  PT. DENIES ANY IDENTIFIED D/C PLANNING NEEDS, HOWEVER WILL CONT. TO FOLLOW FOR ANY THAT MAY ARISE.        Continued Care and Services - Admitted Since 8/10/2021    Coordination has not been started for this encounter.         Demographic Summary    No documentation.       Functional Status    No documentation.       Psychosocial    No documentation.       Abuse/Neglect    No documentation.       Legal    No documentation.       Substance Abuse    No documentation.       Patient Forms    No documentation.           JOHN Walters

## 2021-08-13 NOTE — PLAN OF CARE
Problem: Adult Inpatient Plan of Care  Goal: Plan of Care Review  Outcome: Ongoing, Progressing  Flowsheets (Taken 8/13/2021 0605)  Progress: no change  Plan of Care Reviewed With: patient  Outcome Summary: Pt has no c/o pain during shift. AO. Up ad jesusita. In and out cath done x1. S 84-99 PVC, coup. VSS. Safety maintained

## 2021-08-13 NOTE — CONSULTS
Patient or patient's representative gives informed consent after an explanation of the risks (air embolism; catheter occlusion; phlebitis; catheter infection; bloodstream infection; vein thrombosis; hematoma/bleeding at the insertion site; slight discomfort; accidental puncture of an artery, nerve, or tendon; dislodging of device), benefits (longer dwell time, outpatient treatment, medications that cannot run peripherally), and alternatives (short peripheral catheter, centrally inserted central line, or permanent catheter) of PICC placement. Time provided to ask and answer questions.    Pt had 4 [place] single lumen PICC placed in right basilic vein. Pt tolerated procedure well. 47 cm of catheter internal and 0 cm external. Tip confirmation by 3cg. All lumens flush and draw well. Sterile dressing applied.

## 2021-08-14 LAB
DEPRECATED RDW RBC AUTO: 39 FL (ref 37–54)
ERYTHROCYTE [DISTWIDTH] IN BLOOD BY AUTOMATED COUNT: 12.2 % (ref 12.3–15.4)
HCT VFR BLD AUTO: 33.7 % (ref 37.5–51)
HGB BLD-MCNC: 10.8 G/DL (ref 13–17.7)
MCH RBC QN AUTO: 28.1 PG (ref 26.6–33)
MCHC RBC AUTO-ENTMCNC: 32 G/DL (ref 31.5–35.7)
MCV RBC AUTO: 87.5 FL (ref 79–97)
PLATELET # BLD AUTO: 325 10*3/MM3 (ref 140–450)
PMV BLD AUTO: 10.4 FL (ref 6–12)
RBC # BLD AUTO: 3.85 10*6/MM3 (ref 4.14–5.8)
VANCOMYCIN TROUGH SERPL-MCNC: 6.6 MCG/ML (ref 5–20)
WBC # BLD AUTO: 9.41 10*3/MM3 (ref 3.4–10.8)

## 2021-08-14 PROCEDURE — 25010000002 CEFTRIAXONE PER 250 MG: Performed by: INTERNAL MEDICINE

## 2021-08-14 PROCEDURE — 25010000002 VANCOMYCIN 10 G RECONSTITUTED SOLUTION: Performed by: FAMILY MEDICINE

## 2021-08-14 PROCEDURE — 85027 COMPLETE CBC AUTOMATED: CPT | Performed by: PSYCHIATRY & NEUROLOGY

## 2021-08-14 PROCEDURE — 99231 SBSQ HOSP IP/OBS SF/LOW 25: CPT | Performed by: FAMILY MEDICINE

## 2021-08-14 PROCEDURE — 25010000002 VANCOMYCIN 10 G RECONSTITUTED SOLUTION: Performed by: PSYCHIATRY & NEUROLOGY

## 2021-08-14 PROCEDURE — 80202 ASSAY OF VANCOMYCIN: CPT | Performed by: FAMILY MEDICINE

## 2021-08-14 PROCEDURE — 25010000002 CEFTRIAXONE: Performed by: INTERNAL MEDICINE

## 2021-08-14 PROCEDURE — 99233 SBSQ HOSP IP/OBS HIGH 50: CPT | Performed by: PSYCHIATRY & NEUROLOGY

## 2021-08-14 PROCEDURE — 99233 SBSQ HOSP IP/OBS HIGH 50: CPT | Performed by: NURSE PRACTITIONER

## 2021-08-14 RX ADMIN — CEFTRIAXONE 2 G: 2 INJECTION, POWDER, FOR SOLUTION INTRAMUSCULAR; INTRAVENOUS at 21:34

## 2021-08-14 RX ADMIN — CEFTRIAXONE 2 G: 2 INJECTION, POWDER, FOR SOLUTION INTRAMUSCULAR; INTRAVENOUS at 10:27

## 2021-08-14 RX ADMIN — CLOPIDOGREL 75 MG: 75 TABLET, FILM COATED ORAL at 10:28

## 2021-08-14 RX ADMIN — VANCOMYCIN HYDROCHLORIDE 1500 MG: 10 INJECTION, POWDER, LYOPHILIZED, FOR SOLUTION INTRAVENOUS at 03:09

## 2021-08-14 RX ADMIN — SODIUM CHLORIDE, PRESERVATIVE FREE 10 ML: 5 INJECTION INTRAVENOUS at 21:35

## 2021-08-14 RX ADMIN — METOPROLOL SUCCINATE 25 MG: 25 TABLET, FILM COATED, EXTENDED RELEASE ORAL at 10:42

## 2021-08-14 RX ADMIN — SODIUM CHLORIDE, PRESERVATIVE FREE 10 ML: 5 INJECTION INTRAVENOUS at 10:41

## 2021-08-14 RX ADMIN — LOSARTAN POTASSIUM 12.5 MG: 25 TABLET, FILM COATED ORAL at 10:43

## 2021-08-14 RX ADMIN — ATORVASTATIN CALCIUM 40 MG: 40 TABLET, FILM COATED ORAL at 10:42

## 2021-08-14 RX ADMIN — VANCOMYCIN HYDROCHLORIDE 1250 MG: 10 INJECTION, POWDER, LYOPHILIZED, FOR SOLUTION INTRAVENOUS at 18:38

## 2021-08-14 RX ADMIN — ASPIRIN 81 MG: 81 TABLET ORAL at 10:43

## 2021-08-14 RX ADMIN — TAMSULOSIN HYDROCHLORIDE 0.4 MG: 0.4 CAPSULE ORAL at 10:40

## 2021-08-14 NOTE — PROGRESS NOTES
"Pharmacy Dosing Service  Pharmacokinetics  Vancomycin Follow-up Evaluation    Assessment/Action/Plan:  Current Order: Vancomycin 1500 mg IVPB every 12 hours  Current end date:8/19/2021  Levels: 8/14/2021 Vancomycin trough = 6.6 (12 hours post 1500mg)  Additional antimicrobial agent(s): none    Vancomycin dose adjusted to 1250mg iv q8h. Pharmacy will continue to follow daily and adjust dose accordingly.     Subjective:  Robert Piedra is a 56 y.o. male currently on Vancomycin for the treatment of endocarditis, day 3 of treatment.    AUC Model Data:  Regimen: 1250 mg IV every 8 hours.  Start time: 18:00 on 08/14/2021  Exposure target: AUC24 (range)400-600 mg/L.hr   AUC24,ss: 481 mg/L.hr  PAUC*: 83 %  Ctrough,ss: 13.4 mg/L  Pconc*: 5 %  Tox.: 9 %      Objective:  Ht: 195.6 cm (77\"); Wt: 97.5 kg (215 lb)  Estimated Creatinine Clearance: 140.5 mL/min (by C-G formula based on SCr of 0.8 mg/dL).   Creatinine   Date Value Ref Range Status   08/13/2021 0.80 0.76 - 1.27 mg/dL Final   08/12/2021 0.78 0.76 - 1.27 mg/dL Final   08/10/2021 0.84 0.76 - 1.27 mg/dL Final   08/02/2021 1 0.5 - 1.2 mg/dL Final   06/01/2021 0.9 0.5 - 1.2 mg/dL Final   05/27/2021 0.9 0.5 - 1.2 mg/dL Final      Lab Results   Component Value Date    WBC 9.41 08/14/2021    WBC 9.69 08/13/2021    WBC 9.08 08/12/2021         Lab Results   Component Value Date    VANCOTROUGH 6.60 08/14/2021       Culture Results:  Microbiology Results (last 10 days)       Procedure Component Value - Date/Time    Blood Culture With DEBBIE - Blood, Arm, Right [[id number]]  (Abnormal) Collected: 08/13/21 0813    Lab Status: Preliminary result Specimen: Blood from Arm, Right Updated: 08/14/21 1318     Blood Culture Abnormal Stain     Gram Stain Aerobic Bottle Gram positive cocci in chains    Blood Culture With DEBBIE - Blood, Arm, Left [[id number]]  (Abnormal) Collected: 08/13/21 0813    Lab Status: Preliminary result Specimen: Blood from Arm, Left Updated: 08/14/21 1408     Blood Culture " Abnormal Stain     Gram Stain Aerobic Bottle Gram positive cocci in chains    Blood Culture - Blood, Arm, Right [[id number]]  (Abnormal) Collected: 08/12/21 1111    Lab Status: Preliminary result Specimen: Blood from Arm, Right Updated: 08/14/21 0557     Blood Culture Growth present, too young to evaluate     Gram Stain Aerobic Bottle Gram positive cocci in chains    Blood Culture - Blood, Arm, Left [[id number]]  (Abnormal) Collected: 08/12/21 1111    Lab Status: Preliminary result Specimen: Blood from Arm, Left Updated: 08/14/21 0557     Blood Culture Growth present, too young to evaluate     Gram Stain Aerobic Bottle Gram positive cocci in chains    Blood Culture ID, PCR - Blood, Arm, Left [[id number]]  (Abnormal) Collected: 08/12/21 1111    Lab Status: Final result Specimen: Blood from Arm, Left Updated: 08/13/21 1454     BCID, PCR Enterococcus spp. Identification by BCID PCR.     BOTTLE TYPE Aerobic Bottle    COVID-19,[employer] IN-HOUSE,Nasal Swab No Transport Media 3-4 HR TAT - Swab, Nasal Cavity [[id number]]  (Normal) Collected: 08/10/21 1009    Lab Status: Final result Specimen: Swab from Nasal Cavity Updated: 08/10/21 1109     COVID19 Not Detected    Narrative:      Fact sheet for providers: https://www.fda.gov/media/903264/download     Fact sheet for patients: https://www.fda.gov/media/169804/download    Test performed by PCR.    Consider negative results in combination with clinical observations, patient history, and epidemiological information.  Fact sheet for providers: https://www.fda.gov/media/473616/download     Fact sheet for patients: https://www.fda.gov/media/956037/download    Test performed by PCR.    Consider negative results in combination with clinical observations, patient history, and epidemiological information.            Tee Esparza, PharmD   08/14/21 16:54 CDT
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
## 2021-08-14 NOTE — PROGRESS NOTES
"Infectious Diseases Progress Note    Patient:  Robert Piedra  YOB: 1965  MRN: [id number]   Admit date: 8/10/2021   Admitting Physician: Shad Curiel MD  Primary Care Physician: Shad Curiel MD    Chief Complaint/Interval History: He is without new symptoms.  He is without fever or chills.  He has had no new visual changes or new paresthesias/anesthesia or other symptoms to suggest additional neurological event.  He does not report any abdominal or flank pain at present.  Reviewed his history of penicillin allergy with him.  He indicates he had per the description of his doctor [place], \"a classic 8-day reaction.\"  He indicates about 7 or 8 days into a course of penicillin treatment he developed a significant rash primarily from the waist up.  He also describes getting facial swelling.  He indicates he was sent to the emergency room.  He indicates he received some injections, was treated, and was released.  Indicates this reaction happened in his late teens or early 20s.    Intake/Output Summary (Last 24 hours) at 8/14/2021 1233  Last data filed at 8/14/2021 0357  Gross per 24 hour   Intake 420 ml   Output --   Net 420 ml     Allergies:   Allergies   Allergen Reactions   • Penicillins Hives     Current Scheduled Medications:   aspirin, 81 mg, Oral, Daily  atorvastatin, 40 mg, Oral, Daily  cefTRIAXone, 2 g, Intravenous, Q12H  clopidogrel, 75 mg, Oral, Daily  losartan, 12.5 mg, Oral, Q24H  metoprolol succinate XL, 25 mg, Oral, Q24H  sodium chloride, 10 mL, Intravenous, Q12H  tamsulosin, 0.4 mg, Oral, Daily  vancomycin, 15 mg/kg, Intravenous, Q12H      Current PRN Medications:  •  acetaminophen  •  cyclobenzaprine  •  HYDROcodone-acetaminophen  •  [COMPLETED] Insert peripheral IV **AND** sodium chloride  •  sodium chloride    Review of Systems see HPI    Vital Signs:  /55 (BP Location: Left arm, Patient Position: Lying)   Pulse 82   Temp 98.4 °F (36.9 °C) (Oral)   Resp 18   Ht " "195.6 cm (77\")   Wt 97.5 kg (215 lb)   SpO2 97%   BMI 25.50 kg/m²     Physical Exam  Vital signs - reviewed.  Line/IV (peripheral) site - No erythema, warmth, induration, or tenderness.  Heart somewhat distant heart sounds.  Did not appreciate a systolic or diastolic murmur.  Lungs clear without crackles  Abdomen is soft nontender  Extremities without significant edema  Extremities without splinter hemorrhages or Janeway lesions    Lab Results:  CBC:   Results from last 7 days   Lab Units 08/14/21  0429 08/13/21  0515 08/12/21  1111 08/11/21  1302 08/10/21  0959   WBC 10*3/mm3 9.41 9.69 9.08  --  8.53   HEMOGLOBIN g/dL 10.8* 10.3* 11.0*  --  12.2*   HEMATOCRIT % 33.7* 31.5* 34.0* 36.7* 37.2*   PLATELETS 10*3/mm3 325 319 366 383 364     BMP:  Results from last 7 days   Lab Units 08/13/21  0515 08/12/21  1111 08/12/21  1111 08/10/21  1038 08/10/21  1038   SODIUM mmol/L 135*  --  134*  --  135*   POTASSIUM mmol/L 3.9  --  4.0  --  4.2   CHLORIDE mmol/L 102  --  100  --  100   CO2 mmol/L 25.0  --  26.0  --  25.0   BUN mg/dL 9  --  11  --  11   CREATININE mg/dL 0.80  --  0.78  --  0.84   GLUCOSE mg/dL 121*   < > 121*   < > 110*   CALCIUM mg/dL 8.9  --  9.3  --  9.1   ALT (SGPT) U/L 30  --  20  --  14    < > = values in this interval not displayed.     Culture Results:   Blood Culture   Date Value Ref Range Status   08/13/2021 No growth at 24 hours  Preliminary   08/13/2021 No growth at 24 hours  Preliminary   08/12/2021 Growth present, too young to evaluate  Preliminary   08/12/2021 Growth present, too young to evaluate  Preliminary   Blood cultures on August 12 Enterococcus by [facility] ID PCR.  Final ID and susceptibility pending.    Radiology: None  Additional Studies Reviewed: None    Impression:   1.  Enterococcal endocarditis-mitral valve  2.  Evidence for embolic events on MRI brain and CT abdomen.  Historically does not seem to have had any new embolic events.  3.  Previous history of mitral valve surgery (August " 2020)  4.  Congestive heart failure  5.  Coronary artery disease with fairly recent stenting (May 27, 2021)  6.  Hindu    Recommendations:   Continue vancomycin  Continue ceftriaxone  Follow for any evidence of CHF or additional embolic events/stigmata  Await final ID and susceptibility from blood culture  Follow-up blood culture to make sure bacteremia clearing  Continue supportive care    Elliott Castro MD

## 2021-08-14 NOTE — PROGRESS NOTES
[facility] -  Progress Note     LOS: 4 days   Patient Care Team:  Shad Curiel MD as PCP - General (Family Medicine)  William Gupta MD Knox, Michael Landers, MD as Consulting Physician (Urology)    Chief Complaint: F/U MV Endocarditis, CHF    Subjective   Just getting back to bed. Stated he feels better today. No shortness of breath. Stated he had a headache this morning, but that has resolved. Denies chest pain.       REVIEW OF SYSTEMS:  Constitutional: Denies fever or chills. Denies change in energy level or malaise.  HEENT: Denies visual disturbances. Denies difficulty swallowing or sore throat. Headache this morning, but has resolved.   Respiratory: Denies cough or hoarseness. Denies shortness of breath.   Cardiovascular: Denies chest pain or pressure. Denies palpitations. Denies presyncope/syncope. Denies orthopnea/PND. Denies lower extremity edema.   Gastrointestinal: Denies abdominal pain. Denies nausea/vomiting. Denies change in bowel habits or history of recent GI tract blood loss.   Genitourinary: Denies urinary urgency or frequency. Denies dysuria, hematuria.  Musculoskeletal: Denies pain or swelling in joints.  Neurological: Denies paresthesias. Denies headache. Denies seizure or stroke symptoms.  Behavioral/Psych: Denies problems with anxiety or depression.    All other systems are negative except where stated above.      Objective     Vital Sign Min/Max for last 24 hours  Temp  Min: 98.5 °F (36.9 °C)  Max: 99 °F (37.2 °C)   BP  Min: 94/57  Max: 128/68   Pulse  Min: 81  Max: 97   Resp  Min: 18  Max: 18   SpO2  Min: 97 %  Max: 98 %   No data recorded   Weight  Min: 97.5 kg (215 lb)  Max: 97.5 kg (215 lb)         08/13/21 2021   Weight: 97.5 kg (215 lb)         Intake/Output Summary (Last 24 hours) at 8/14/2021 1001  Last data filed at 8/14/2021 0357  Gross per 24 hour   Intake 420 ml   Output --   Net 420 ml         Physical Exam:    General Appearance:     Alert, cooperative, in no acute distress. Ill-appearing male.   HEENT:    Normocephalic. Atraumatic. PATRIC.   Neck:   Supple, trachea midline, no JVD. Adequate range of motion without pain.   Lungs:     Clear to auscultation, respirations regular, even and unlabored    Heart:    Regular rhythm and normal rate, normal S1 and S2, no murmur, no gallop, no rub, no click   Abdomen:     Normal bowel sounds, soft, non-tender, non-distended   Extremities:   Moves all extremities, no edema, no cyanosis, no redness   Pulses:   Pulses palpable and equal bilaterally   Skin:   No bleeding, bruising or rash   Neurologic:   Grossly intact            Results Review:   Lab Results (last 72 hours)     Procedure Component Value Units Date/Time    Blood Culture With DEBBIE - Blood, Arm, Right [[id number]] Collected: 08/13/21 0813    Specimen: Blood from Arm, Right Updated: 08/14/21 0830     Blood Culture No growth at 24 hours    Blood Culture With DEBBIE - Blood, Arm, Left [[id number]] Collected: 08/13/21 0813    Specimen: Blood from Arm, Left Updated: 08/14/21 0830     Blood Culture No growth at 24 hours    Blood Culture - Blood, Arm, Right [[id number]]  (Abnormal) Collected: 08/12/21 1111    Specimen: Blood from Arm, Right Updated: 08/14/21 0557     Blood Culture Growth present, too young to evaluate     Gram Stain Aerobic Bottle Gram positive cocci in chains    Blood Culture - Blood, Arm, Left [[id number]]  (Abnormal) Collected: 08/12/21 1111    Specimen: Blood from Arm, Left Updated: 08/14/21 0557     Blood Culture Growth present, too young to evaluate     Gram Stain Aerobic Bottle Gram positive cocci in chains    CBC (No Diff) [[id number]]  (Abnormal) Collected: 08/14/21 0429    Specimen: Blood Updated: 08/14/21 0506     WBC 9.41 10*3/mm3      RBC 3.85 10*6/mm3      Hemoglobin 10.8 g/dL      Hematocrit 33.7 %      MCV 87.5 fL      MCH 28.1 pg      MCHC 32.0 g/dL      RDW 12.2 %      RDW-SD 39.0 fl      MPV 10.4 fL      Platelets 325 10*3/mm3      Blood Culture ID, PCR - Blood, Arm, Left [[id number]]  (Abnormal) Collected: 08/12/21 1111    Specimen: Blood from Arm, Left Updated: 08/13/21 1454     BCID, PCR Enterococcus spp. Identification by BCID PCR.     BOTTLE TYPE Aerobic Bottle    Tropheryma Whipplei PCR [[id number]] Collected: 08/13/21 1305    Specimen: Blood Updated: 08/13/21 1309    Tularemia Antibody [[id number]] Collected: 08/13/21 0813    Specimen: Blood Updated: 08/13/21 0821    Bartonella Antibody Panel [[id number]] Collected: 08/13/21 0813    Specimen: Blood Updated: 08/13/21 0821    Bartonella, DNA, Amp Probe [[id number]] Collected: 08/13/21 0813    Specimen: Blood Updated: 08/13/21 0821    C-reactive Protein [[id number]]  (Abnormal) Collected: 08/13/21 0515    Specimen: Blood Updated: 08/13/21 0820     C-Reactive Protein 5.29 mg/dL     Sedimentation Rate [[id number]]  (Abnormal) Collected: 08/13/21 0515    Specimen: Blood Updated: 08/13/21 0813     Sed Rate 41 mm/hr     Comprehensive Metabolic Panel [[id number]]  (Abnormal) Collected: 08/13/21 0515    Specimen: Blood Updated: 08/13/21 0625     Glucose 121 mg/dL      BUN 9 mg/dL      Creatinine 0.80 mg/dL      Sodium 135 mmol/L      Potassium 3.9 mmol/L      Chloride 102 mmol/L      CO2 25.0 mmol/L      Calcium 8.9 mg/dL      Total Protein 6.9 g/dL      Albumin 3.00 g/dL      ALT (SGPT) 30 U/L      AST (SGOT) 41 U/L      Alkaline Phosphatase 85 U/L      Total Bilirubin 0.4 mg/dL      eGFR Non African Amer 100 mL/min/1.73      Globulin 3.9 gm/dL      A/G Ratio 0.8 g/dL      BUN/Creatinine Ratio 11.3     Anion Gap 8.0 mmol/L     Narrative:      GFR Normal >60  Chronic Kidney Disease <60  Kidney Failure <15      CBC (No Diff) [[id number]]  (Abnormal) Collected: 08/13/21 0515    Specimen: Blood Updated: 08/13/21 0600     WBC 9.69 10*3/mm3      RBC 3.61 10*6/mm3      Hemoglobin 10.3 g/dL      Hematocrit 31.5 %      MCV 87.3 fL      MCH 28.5 pg      MCHC 32.7 g/dL      RDW 12.1 %      RDW-SD 38.9 fl       MPV 10.6 fL      Platelets 319 10*3/mm3     Comprehensive Metabolic Panel [[id number]]  (Abnormal) Collected: 08/12/21 1111    Specimen: Blood Updated: 08/12/21 1205     Glucose 121 mg/dL      BUN 11 mg/dL      Creatinine 0.78 mg/dL      Sodium 134 mmol/L      Potassium 4.0 mmol/L      Chloride 100 mmol/L      CO2 26.0 mmol/L      Calcium 9.3 mg/dL      Total Protein 7.2 g/dL      Albumin 3.20 g/dL      ALT (SGPT) 20 U/L      AST (SGOT) 28 U/L      Alkaline Phosphatase 88 U/L      Total Bilirubin 0.4 mg/dL      eGFR Non African Amer 103 mL/min/1.73      Globulin 4.0 gm/dL      A/G Ratio 0.8 g/dL      BUN/Creatinine Ratio 14.1     Anion Gap 8.0 mmol/L     Narrative:      GFR Normal >60  Chronic Kidney Disease <60  Kidney Failure <15      CBC & Differential [[id number]]  (Abnormal) Collected: 08/12/21 1111    Specimen: Blood Updated: 08/12/21 1138    Narrative:      The following orders were created for panel order CBC & Differential.  Procedure                               Abnormality         Status                     ---------                               -----------         ------                     CBC Auto Differential[[id number]]        Abnormal            Final result                 Please view results for these tests on the individual orders.    CBC Auto Differential [[id number]]  (Abnormal) Collected: 08/12/21 1111    Specimen: Blood Updated: 08/12/21 1138     WBC 9.08 10*3/mm3      RBC 3.92 10*6/mm3      Hemoglobin 11.0 g/dL      Hematocrit 34.0 %      MCV 86.7 fL      MCH 28.1 pg      MCHC 32.4 g/dL      RDW 12.1 %      RDW-SD 38.5 fl      MPV 10.3 fL      Platelets 366 10*3/mm3      Neutrophil % 81.2 %      Lymphocyte % 11.1 %      Monocyte % 5.7 %      Eosinophil % 1.2 %      Basophil % 0.2 %      Immature Grans % 0.6 %      Neutrophils, Absolute 7.37 10*3/mm3      Lymphocytes, Absolute 1.01 10*3/mm3      Monocytes, Absolute 0.52 10*3/mm3      Eosinophils, Absolute 0.11 10*3/mm3      Basophils, Absolute  0.02 10*3/mm3      Immature Grans, Absolute 0.05 10*3/mm3      nRBC 0.0 /100 WBC     Vitamin B12 [[id number]]  (Normal) Collected: 08/10/21 1038    Specimen: Blood Updated: 08/11/21 2306     Vitamin B-12 669 pg/mL     Narrative:      Results may be falsely increased if patient taking Biotin.             2D Echocardiogram:  · Left ventricular ejection fraction appears to be 31 - 35%. Left ventricular systolic function is moderately decreased.  · The left ventricular cavity is mildly dilated.  · Estimated right ventricular systolic pressure from tricuspid regurgitation is normal (<35 mmHg).  · Normal size and function of the right ventricle.  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       Medication Review: yes  Current Facility-Administered Medications   Medication Dose Route Frequency Provider Last Rate Last Admin   • acetaminophen (TYLENOL) tablet 650 mg  650 mg Oral Q6H PRN Shad Curiel MD   650 mg at 08/13/21 1449   • aspirin EC tablet 81 mg  81 mg Oral Daily Knees, Malgorzata E, APRN   81 mg at 08/13/21 0953   • atorvastatin (LIPITOR) tablet 40 mg  40 mg Oral Daily Knees, Malgorzata LAZCANO APRN   40 mg at 08/13/21 0953   • cefTRIAXone (ROCEPHIN) 2 g/100 mL 0.9% NS IVPB (MBP)  2 g Intravenous Q12H Elliott Rock MD   2 g at 08/13/21 2234   • clopidogrel (PLAVIX) tablet 75 mg  75 mg Oral Daily Shad Curiel MD   75 mg at 08/13/21 0954   • cyclobenzaprine (FLEXERIL) tablet 5 mg  5 mg Oral TID PRN Shad Curiel MD       • HYDROcodone-acetaminophen (NORCO) 5-325 MG per tablet 1 tablet  1 tablet Oral Q6H PRN Shad Curiel MD       • losartan (COZAAR) tablet 12.5 mg  12.5 mg Oral Q24H Knees, Malgorzata LAZCANO, APRN   12.5 mg at 08/13/21 0951   •  metoprolol succinate XL (TOPROL-XL) 24 hr tablet 25 mg  25 mg Oral Q24H Abby LUDWIG Johnson   25 mg at 08/13/21 0953   • sodium chloride 0.9 % flush 10 mL  10 mL Intravenous PRN Shad Curiel MD       • sodium chloride 0.9 % flush 10 mL  10 mL Intravenous Q12H Shad Curiel MD   10 mL at 08/13/21 2241   • sodium chloride 0.9 % flush 10 mL  10 mL Intravenous PRN Shad Curiel MD       • tamsulosin (FLOMAX) 24 hr capsule 0.4 mg  0.4 mg Oral Daily Shad Curiel MD   0.4 mg at 08/13/21 0954   • vancomycin 1500 mg/500 mL 0.9% NS IVPB ([facility])  15 mg/kg Intravenous Q12H Angelo Johnson MD   1,500 mg at 08/14/21 0309         Assessment/Plan       Acute bacterial endocarditis, Mitral Valve - on Merrem and Rocephin. ID Following. CT surgery consulted.     Cerebrovascular accident (CVA) due to embolism of cerebral artery (CMS/HCC) - No residual deficits at present    CAD - S/P KELLY x 3 to RCA by Dr. Thomas at [facility] on 05/27/2021    Paroxysmal Atrial Fibrillation - S/P Ablation, Unsuccessful in November 2019 at [facility]    Chronic Systolic CHF - Currently Compensated. On BB, ARB. Consider LifeVest at Discharge    DVT, RLE - Suspect Embolic from Endocarditis    Hx MV Repair (Minimally Invasive) by Dr. Telles at [facility]      Continue current treatment. Await CT surgery recommendations.     LUDWIG Kimbrough  08/14/21  10:01 T

## 2021-08-14 NOTE — PLAN OF CARE
Goal Outcome Evaluation:           Progress: no change  Outcome Summary: Pt A&Ox4. Denies pain. S/ST HR:  with PVC COUP on tele. PICC line dressing CDI. IV abx given. Up x adlib. Safety maintained.

## 2021-08-15 LAB
DEPRECATED RDW RBC AUTO: 39.5 FL (ref 37–54)
ERYTHROCYTE [DISTWIDTH] IN BLOOD BY AUTOMATED COUNT: 12.3 % (ref 12.3–15.4)
HCT VFR BLD AUTO: 31.3 % (ref 37.5–51)
HGB BLD-MCNC: 10 G/DL (ref 13–17.7)
MCH RBC QN AUTO: 28 PG (ref 26.6–33)
MCHC RBC AUTO-ENTMCNC: 31.9 G/DL (ref 31.5–35.7)
MCV RBC AUTO: 87.7 FL (ref 79–97)
PLATELET # BLD AUTO: 328 10*3/MM3 (ref 140–450)
PMV BLD AUTO: 10.5 FL (ref 6–12)
RBC # BLD AUTO: 3.57 10*6/MM3 (ref 4.14–5.8)
WBC # BLD AUTO: 9.08 10*3/MM3 (ref 3.4–10.8)

## 2021-08-15 PROCEDURE — 99233 SBSQ HOSP IP/OBS HIGH 50: CPT | Performed by: PSYCHIATRY & NEUROLOGY

## 2021-08-15 PROCEDURE — 99232 SBSQ HOSP IP/OBS MODERATE 35: CPT | Performed by: NURSE PRACTITIONER

## 2021-08-15 PROCEDURE — 85027 COMPLETE CBC AUTOMATED: CPT | Performed by: PSYCHIATRY & NEUROLOGY

## 2021-08-15 PROCEDURE — 99231 SBSQ HOSP IP/OBS SF/LOW 25: CPT | Performed by: FAMILY MEDICINE

## 2021-08-15 PROCEDURE — 25010000002 VANCOMYCIN 10 G RECONSTITUTED SOLUTION: Performed by: FAMILY MEDICINE

## 2021-08-15 PROCEDURE — 25010000002 CEFTRIAXONE PER 250 MG: Performed by: INTERNAL MEDICINE

## 2021-08-15 PROCEDURE — 87040 BLOOD CULTURE FOR BACTERIA: CPT | Performed by: INTERNAL MEDICINE

## 2021-08-15 RX ADMIN — SODIUM CHLORIDE, PRESERVATIVE FREE 10 ML: 5 INJECTION INTRAVENOUS at 10:55

## 2021-08-15 RX ADMIN — VANCOMYCIN HYDROCHLORIDE 1250 MG: 10 INJECTION, POWDER, LYOPHILIZED, FOR SOLUTION INTRAVENOUS at 21:04

## 2021-08-15 RX ADMIN — LOSARTAN POTASSIUM 12.5 MG: 25 TABLET, FILM COATED ORAL at 11:12

## 2021-08-15 RX ADMIN — VANCOMYCIN HYDROCHLORIDE 1250 MG: 10 INJECTION, POWDER, LYOPHILIZED, FOR SOLUTION INTRAVENOUS at 12:53

## 2021-08-15 RX ADMIN — METOPROLOL SUCCINATE 25 MG: 25 TABLET, FILM COATED, EXTENDED RELEASE ORAL at 10:54

## 2021-08-15 RX ADMIN — CEFTRIAXONE 2 G: 2 INJECTION, POWDER, FOR SOLUTION INTRAMUSCULAR; INTRAVENOUS at 20:22

## 2021-08-15 RX ADMIN — ASPIRIN 81 MG: 81 TABLET ORAL at 10:54

## 2021-08-15 RX ADMIN — ATORVASTATIN CALCIUM 40 MG: 40 TABLET, FILM COATED ORAL at 10:54

## 2021-08-15 RX ADMIN — VANCOMYCIN HYDROCHLORIDE 1250 MG: 10 INJECTION, POWDER, LYOPHILIZED, FOR SOLUTION INTRAVENOUS at 03:27

## 2021-08-15 RX ADMIN — CEFTRIAXONE 2 G: 2 INJECTION, POWDER, FOR SOLUTION INTRAMUSCULAR; INTRAVENOUS at 10:56

## 2021-08-15 RX ADMIN — CLOPIDOGREL 75 MG: 75 TABLET, FILM COATED ORAL at 10:54

## 2021-08-15 RX ADMIN — SODIUM CHLORIDE, PRESERVATIVE FREE 10 ML: 5 INJECTION INTRAVENOUS at 21:45

## 2021-08-15 RX ADMIN — TAMSULOSIN HYDROCHLORIDE 0.4 MG: 0.4 CAPSULE ORAL at 10:54

## 2021-08-15 NOTE — CASE MANAGEMENT/SOCIAL WORK
Continued Stay Note   Salma     Patient Name: Robert Piedra  MRN: [id number]  Today's Date: 8/15/2021    Admit Date: 8/10/2021    Discharge Plan     Row Name 08/15/21 1244       Plan    Plan  Home with spouse    Patient/Family in Agreement with Plan  yes    Plan Comments  Chart reviewed; events noted.  Awaiting cultures.  Possible lifevest at [place].  SW will continue to follow.        Discharge Codes    No documentation.             RICKEY HortonW

## 2021-08-15 NOTE — NURSING NOTE
Pt voiding very small amounts. Bladder scan performed & showed 699 mL. This RN & CHUY Parra went to in & out cath patient. Pt refused for RN to in & out cath. Pt self caths at home. Pt wanted to self cath himself. In & out cath placed at bedside for patient to self cath.

## 2021-08-15 NOTE — PROGRESS NOTES
"Pharmacy Dosing Service  Pharmacokinetics  Vancomycin Follow-up Evaluation     Assessment/Action/Plan:  Current Order: Vancomycin 1250 mg IVPB every 8 hours  Current end date:8/19/2021  Levels: Vancomycin trough ordered before dose due on 8/16/2021 at 1300  Previously on 8/14/2021 Vancomycin trough = 6.6 (12 hours post 1500mg)  Additional antimicrobial agent(s): Ceftriaxone     Vancomycin dose adjusted to 1250mg iv q8h. Pharmacy will continue to follow daily and adjust dose accordingly.      Subjective:  Robert Piedra is a 56 y.o. male currently on Vancomycin for the treatment of endocarditis, day 4 of treatment.    AUC Model Data:  Regimen: 1250 mg IV every 8 hours.  Exposure target: AUC24 (range)400-600 mg/L.hr   AUC24,ss: 486 mg/L.hr  PAUC*: 84 %  Ctrough,ss: 13.6 mg/L  Pconc*: 6 %  Tox.: 9 %      Objective:  Ht: 195.6 cm (77\"); Wt: 97.5 kg (215 lb)  Estimated Creatinine Clearance: 140.5 mL/min (by C-G formula based on SCr of 0.8 mg/dL).   Creatinine   Date Value Ref Range Status   08/13/2021 0.80 0.76 - 1.27 mg/dL Final   08/12/2021 0.78 0.76 - 1.27 mg/dL Final   08/10/2021 0.84 0.76 - 1.27 mg/dL Final   08/02/2021 1 0.5 - 1.2 mg/dL Final   06/01/2021 0.9 0.5 - 1.2 mg/dL Final   05/27/2021 0.9 0.5 - 1.2 mg/dL Final      Lab Results   Component Value Date    WBC 9.08 08/15/2021    WBC 9.41 08/14/2021    WBC 9.69 08/13/2021         Lab Results   Component Value Date    VANCOTROUGH 6.60 08/14/2021       Culture Results:  Microbiology Results (last 10 days)       Procedure Component Value - Date/Time    Blood Culture With DEBBIE - Blood, Arm, Right [[id number]]  (Abnormal) Collected: 08/13/21 0813    Lab Status: Preliminary result Specimen: Blood from Arm, Right Updated: 08/15/21 0532     Blood Culture Growth present, too young to evaluate     Gram Stain Aerobic Bottle Gram positive cocci in chains      Anaerobic Bottle Gram positive cocci    Blood Culture With DEBBIE - Blood, Arm, Left [[id number]]  (Abnormal) Collected: " 08/13/21 0813    Lab Status: Preliminary result Specimen: Blood from Arm, Left Updated: 08/15/21 0531     Blood Culture Growth present, too young to evaluate     Gram Stain Aerobic Bottle Gram positive cocci in chains      Anaerobic Bottle Gram positive cocci    Blood Culture - Blood, Arm, Right [[id number]]  (Abnormal) Collected: 08/12/21 1111    Lab Status: Preliminary result Specimen: Blood from Arm, Right Updated: 08/15/21 0534     Blood Culture Enterococcus species     Comment: Infectious disease consultation is highly recommended.        Isolated from Aerobic Bottle     Gram Stain Aerobic Bottle Gram positive cocci in chains    Blood Culture - Blood, Arm, Left [[id number]]  (Abnormal) Collected: 08/12/21 1111    Lab Status: Preliminary result Specimen: Blood from Arm, Left Updated: 08/15/21 0533     Blood Culture Enterococcus species     Comment: Infectious disease consultation is highly recommended.        Isolated from Aerobic Bottle     Gram Stain Aerobic Bottle Gram positive cocci in chains    Blood Culture ID, PCR - Blood, Arm, Left [[id number]]  (Abnormal) Collected: 08/12/21 1111    Lab Status: Final result Specimen: Blood from Arm, Left Updated: 08/13/21 1454     BCID, PCR Enterococcus spp. Identification by BCID PCR.     BOTTLE TYPE Aerobic Bottle    COVID-19,[employer] IN-HOUSE,Nasal Swab No Transport Media 3-4 HR TAT - Swab, Nasal Cavity [[id number]]  (Normal) Collected: 08/10/21 1009    Lab Status: Final result Specimen: Swab from Nasal Cavity Updated: 08/10/21 1109     COVID19 Not Detected    Narrative:      Fact sheet for providers: https://www.fda.gov/media/836173/download     Fact sheet for patients: https://www.fda.gov/media/872741/download    Test performed by PCR.    Consider negative results in combination with clinical observations, patient history, and epidemiological information.  Fact sheet for providers: https://www.fda.gov/media/210402/download     Fact sheet for patients:  https://www.fda.gov/media/619430/download    Test performed by PCR.    Consider negative results in combination with clinical observations, patient history, and epidemiological information.            Tee Esparza, PharmD   08/15/21 11:41 CDT

## 2021-08-15 NOTE — PROGRESS NOTES
[facility] -  Progress Note     LOS: 5 days   Patient Care Team:  Shad Curiel MD as PCP - General (Family Medicine)  William Gupta MD Knox, Michael Landers, MD as Consulting Physician (Urology)    Chief Complaint: F/U MV Endocarditis, CHF    Subjective   Sitting up in bed eating breakfast. Stated he had to be       REVIEW OF SYSTEMS:  Constitutional: Denies fever or chills. Denies change in energy level or malaise.  HEENT: Denies headache or visual disturbances. Denies difficulty swallowing or sore throat.   Respiratory: Denies cough or hoarseness. Denies shortness of breath.   Cardiovascular: Denies chest pain or pressure. Denies palpitations. Denies presyncope/syncope. Denies orthopnea/PND. Denies lower extremity edema.   Gastrointestinal: Denies abdominal pain. Denies nausea/vomiting. Denies change in bowel habits or history of recent GI tract blood loss.   Genitourinary: Denies urinary urgency or frequency. Denies dysuria, hematuria.  Musculoskeletal: Denies pain or swelling in joints.  Neurological: Denies paresthesias. Denies headache. Denies seizure or stroke symptoms.  Behavioral/Psych: Denies problems with anxiety or depression.    All other systems are negative except where stated above.      Objective     Vital Sign Min/Max for last 24 hours  Temp  Min: 97.4 °F (36.3 °C)  Max: 98.4 °F (36.9 °C)   BP  Min: 109/64  Max: 129/65   Pulse  Min: 80  Max: 85   Resp  Min: 16  Max: 18   SpO2  Min: 95 %  Max: 97 %   No data recorded   No data recorded         08/13/21 2021   Weight: 97.5 kg (215 lb)         Intake/Output Summary (Last 24 hours) at 8/15/2021 0836  Last data filed at 8/15/2021 0325  Gross per 24 hour   Intake 300 ml   Output 1450 ml   Net -1150 ml         Physical Exam:    General Appearance:    Alert, cooperative, in no acute distress. Ill-appearing male.   HEENT:    Normocephalic. Atraumatic. PATRIC.   Neck:   Supple, trachea midline, no JVD. Adequate  range of motion without pain.   Lungs:     Clear to auscultation, respirations regular, even and unlabored    Heart:    Regular rhythm and normal rate, normal S1 and S2, no murmur, no gallop, no rub, no click   Abdomen:     Normal bowel sounds, soft, non-tender, non-distended   Extremities:   Moves all extremities, no edema, no cyanosis, no redness   Pulses:   Pulses palpable and equal bilaterally   Skin:   No bleeding, bruising or rash   Neurologic:   Grossly intact            Results Review:   Lab Results (last 72 hours)     Procedure Component Value Units Date/Time    Blood Culture With DEBBIE - Blood, Arm, Right [[id number]] Collected: 08/13/21 0813    Specimen: Blood from Arm, Right Updated: 08/14/21 0830     Blood Culture No growth at 24 hours    Blood Culture With DEBBIE - Blood, Arm, Left [[id number]] Collected: 08/13/21 0813    Specimen: Blood from Arm, Left Updated: 08/14/21 0830     Blood Culture No growth at 24 hours    Blood Culture - Blood, Arm, Right [[id number]]  (Abnormal) Collected: 08/12/21 1111    Specimen: Blood from Arm, Right Updated: 08/14/21 0557     Blood Culture Growth present, too young to evaluate     Gram Stain Aerobic Bottle Gram positive cocci in chains    Blood Culture - Blood, Arm, Left [[id number]]  (Abnormal) Collected: 08/12/21 1111    Specimen: Blood from Arm, Left Updated: 08/14/21 0557     Blood Culture Growth present, too young to evaluate     Gram Stain Aerobic Bottle Gram positive cocci in chains    CBC (No Diff) [[id number]]  (Abnormal) Collected: 08/14/21 0429    Specimen: Blood Updated: 08/14/21 0506     WBC 9.41 10*3/mm3      RBC 3.85 10*6/mm3      Hemoglobin 10.8 g/dL      Hematocrit 33.7 %      MCV 87.5 fL      MCH 28.1 pg      MCHC 32.0 g/dL      RDW 12.2 %      RDW-SD 39.0 fl      MPV 10.4 fL      Platelets 325 10*3/mm3     Blood Culture ID, PCR - Blood, Arm, Left [[id number]]  (Abnormal) Collected: 08/12/21 1111    Specimen: Blood from Arm, Left Updated: 08/13/21 1454      BCID, PCR Enterococcus spp. Identification by BCID PCR.     BOTTLE TYPE Aerobic Bottle    Tropheryma Whipplei PCR [[id number]] Collected: 08/13/21 1305    Specimen: Blood Updated: 08/13/21 1309    Tularemia Antibody [[id number]] Collected: 08/13/21 0813    Specimen: Blood Updated: 08/13/21 0821    Bartonella Antibody Panel [[id number]] Collected: 08/13/21 0813    Specimen: Blood Updated: 08/13/21 0821    Bartonella, DNA, Amp Probe [[id number]] Collected: 08/13/21 0813    Specimen: Blood Updated: 08/13/21 0821    C-reactive Protein [[id number]]  (Abnormal) Collected: 08/13/21 0515    Specimen: Blood Updated: 08/13/21 0820     C-Reactive Protein 5.29 mg/dL     Sedimentation Rate [[id number]]  (Abnormal) Collected: 08/13/21 0515    Specimen: Blood Updated: 08/13/21 0813     Sed Rate 41 mm/hr     Comprehensive Metabolic Panel [[id number]]  (Abnormal) Collected: 08/13/21 0515    Specimen: Blood Updated: 08/13/21 0625     Glucose 121 mg/dL      BUN 9 mg/dL      Creatinine 0.80 mg/dL      Sodium 135 mmol/L      Potassium 3.9 mmol/L      Chloride 102 mmol/L      CO2 25.0 mmol/L      Calcium 8.9 mg/dL      Total Protein 6.9 g/dL      Albumin 3.00 g/dL      ALT (SGPT) 30 U/L      AST (SGOT) 41 U/L      Alkaline Phosphatase 85 U/L      Total Bilirubin 0.4 mg/dL      eGFR Non African Amer 100 mL/min/1.73      Globulin 3.9 gm/dL      A/G Ratio 0.8 g/dL      BUN/Creatinine Ratio 11.3     Anion Gap 8.0 mmol/L     Narrative:      GFR Normal >60  Chronic Kidney Disease <60  Kidney Failure <15      CBC (No Diff) [[id number]]  (Abnormal) Collected: 08/13/21 0515    Specimen: Blood Updated: 08/13/21 0600     WBC 9.69 10*3/mm3      RBC 3.61 10*6/mm3      Hemoglobin 10.3 g/dL      Hematocrit 31.5 %      MCV 87.3 fL      MCH 28.5 pg      MCHC 32.7 g/dL      RDW 12.1 %      RDW-SD 38.9 fl      MPV 10.6 fL      Platelets 319 10*3/mm3     Comprehensive Metabolic Panel [[id number]]  (Abnormal) Collected: 08/12/21 1111    Specimen: Blood Updated:  08/12/21 1205     Glucose 121 mg/dL      BUN 11 mg/dL      Creatinine 0.78 mg/dL      Sodium 134 mmol/L      Potassium 4.0 mmol/L      Chloride 100 mmol/L      CO2 26.0 mmol/L      Calcium 9.3 mg/dL      Total Protein 7.2 g/dL      Albumin 3.20 g/dL      ALT (SGPT) 20 U/L      AST (SGOT) 28 U/L      Alkaline Phosphatase 88 U/L      Total Bilirubin 0.4 mg/dL      eGFR Non African Amer 103 mL/min/1.73      Globulin 4.0 gm/dL      A/G Ratio 0.8 g/dL      BUN/Creatinine Ratio 14.1     Anion Gap 8.0 mmol/L     Narrative:      GFR Normal >60  Chronic Kidney Disease <60  Kidney Failure <15      CBC & Differential [[id number]]  (Abnormal) Collected: 08/12/21 1111    Specimen: Blood Updated: 08/12/21 1138    Narrative:      The following orders were created for panel order CBC & Differential.  Procedure                               Abnormality         Status                     ---------                               -----------         ------                     CBC Auto Differential[[id number]]        Abnormal            Final result                 Please view results for these tests on the individual orders.    CBC Auto Differential [[id number]]  (Abnormal) Collected: 08/12/21 1111    Specimen: Blood Updated: 08/12/21 1138     WBC 9.08 10*3/mm3      RBC 3.92 10*6/mm3      Hemoglobin 11.0 g/dL      Hematocrit 34.0 %      MCV 86.7 fL      MCH 28.1 pg      MCHC 32.4 g/dL      RDW 12.1 %      RDW-SD 38.5 fl      MPV 10.3 fL      Platelets 366 10*3/mm3      Neutrophil % 81.2 %      Lymphocyte % 11.1 %      Monocyte % 5.7 %      Eosinophil % 1.2 %      Basophil % 0.2 %      Immature Grans % 0.6 %      Neutrophils, Absolute 7.37 10*3/mm3      Lymphocytes, Absolute 1.01 10*3/mm3      Monocytes, Absolute 0.52 10*3/mm3      Eosinophils, Absolute 0.11 10*3/mm3      Basophils, Absolute 0.02 10*3/mm3      Immature Grans, Absolute 0.05 10*3/mm3      nRBC 0.0 /100 WBC     Vitamin B12 [[id number]]  (Normal) Collected: 08/10/21 1038     Specimen: Blood Updated: 08/11/21 2306     Vitamin B-12 669 pg/mL     Narrative:      Results may be falsely increased if patient taking Biotin.             2D Echocardiogram:  · Left ventricular ejection fraction appears to be 31 - 35%. Left ventricular systolic function is moderately decreased.  · The left ventricular cavity is mildly dilated.  · Estimated right ventricular systolic pressure from tricuspid regurgitation is normal (<35 mmHg).  · Normal size and function of the right ventricle.  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       Medication Review: yes  Current Facility-Administered Medications   Medication Dose Route Frequency Provider Last Rate Last Admin   • acetaminophen (TYLENOL) tablet 650 mg  650 mg Oral Q6H PRN Shad Curiel MD   650 mg at 08/13/21 1449   • aspirin EC tablet 81 mg  81 mg Oral Daily KneesMalgorzata APRN   81 mg at 08/14/21 1043   • atorvastatin (LIPITOR) tablet 40 mg  40 mg Oral Daily Knees, Malgorzata LAZCANO APRN   40 mg at 08/14/21 1042   • cefTRIAXone (ROCEPHIN) 2 g/100 mL 0.9% NS IVPB (MBP)  2 g Intravenous Q12H Elliott Rock MD   2 g at 08/14/21 2134   • clopidogrel (PLAVIX) tablet 75 mg  75 mg Oral Daily Shad Curiel MD   75 mg at 08/14/21 1028   • cyclobenzaprine (FLEXERIL) tablet 5 mg  5 mg Oral TID PRN Shad Curiel MD       • HYDROcodone-acetaminophen (NORCO) 5-325 MG per tablet 1 tablet  1 tablet Oral Q6H PRN Shad Curiel MD       • losartan (COZAAR) tablet 12.5 mg  12.5 mg Oral Q24H Malgorzata Mora APRN   12.5 mg at 08/14/21 1043   • metoprolol succinate XL (TOPROL-XL) 24 hr tablet 25 mg  25 mg Oral Q24H Malgorzata Mora APRN   25 mg at 08/14/21 1042   • sodium chloride 0.9 % flush 10  mL  10 mL Intravenous PRN Shad Curiel MD       • sodium chloride 0.9 % flush 10 mL  10 mL Intravenous Q12H Shad Curiel MD   10 mL at 08/14/21 2135   • sodium chloride 0.9 % flush 10 mL  10 mL Intravenous PRN Shad Curiel MD       • tamsulosin (FLOMAX) 24 hr capsule 0.4 mg  0.4 mg Oral Daily Shad Curiel MD   0.4 mg at 08/14/21 1040   • vancomycin 1250 mg/250 mL 0.9% NS IVPB ([facility])  1,250 mg Intravenous Q8H Shad Curiel MD   1,250 mg at 08/15/21 0327         Assessment/Plan       Acute bacterial endocarditis, Mitral Valve - on Merrem and Rocephin. ID Following. CT surgery consulted.     Cerebrovascular accident (CVA) due to embolism of cerebral artery (CMS/HCC) - No residual deficits at present    CAD - S/P KELLY x 3 to RCA by Dr. Thomas at [facility] on 05/27/2021    Paroxysmal Atrial Fibrillation - S/P Ablation, Unsuccessful in November 2019 at [facility]    Chronic Systolic CHF - Currently Compensated. On BB, ARB. Consider LifeVest at Discharge    DVT, RLE - Suspect Embolic from Endocarditis    Hx MV Repair (Minimally Invasive) by Dr. Telles at [place]      Continue current treatment and conference with involved providers.        Alma Rosa Lewis, LUDWIG  08/15/21  08:36 CDT

## 2021-08-15 NOTE — PLAN OF CARE
Problem: Adult Inpatient Plan of Care  Goal: Plan of Care Review  Outcome: Ongoing, Progressing  Flowsheets  Taken 8/15/2021 1758 by Amy Quintana, RN  Progress: no change  Outcome Summary: Pt A&Ox4. Denies pain. Denies numbness/tingling. Pt is running Sinus HR: 78-97 with PVC on tele. IV abx given this shift as ordered. Pt self cathed this shift. Up x adlib. PICC line dressing capped & CDI. Safety maintained.  Taken 8/13/2021 0605 by Juana Childs, RN  Plan of Care Reviewed With: patient

## 2021-08-15 NOTE — PROGRESS NOTES
"CC:\"i feel much better\"--denies fever or chils    Review of Systems   Constitutional: Positive for activity change.   HENT: Negative.    Eyes: Negative.    Respiratory: Negative.    Cardiovascular: Negative.    Gastrointestinal: Negative.    Endocrine: Negative.    Genitourinary: Negative.    Musculoskeletal: Negative.    Skin: Negative.    Allergic/Immunologic: Negative.    Neurological: Negative.    Hematological: Negative.    Psychiatric/Behavioral: Negative.      Temp:  [97.4 °F (36.3 °C)-98.4 °F (36.9 °C)] 97.4 °F (36.3 °C)  Heart Rate:  [80-85] 80  Resp:  [16-18] 16  BP: (109-129)/(55-68) 109/64  I/O last 3 completed shifts:  In: 720 [P.O.:720]  Out: 1450 [Urine:1450]  No intake/output data recorded.    Physical Exam  Vitals reviewed.   Constitutional:       Appearance: He is normal weight.   HENT:      Head: Normocephalic and atraumatic.      Nose: Nose normal.      Mouth/Throat:      Mouth: Mucous membranes are moist.   Eyes:      Extraocular Movements: Extraocular movements intact.      Pupils: Pupils are equal, round, and reactive to light.   Cardiovascular:      Rate and Rhythm: Normal rate and regular rhythm.      Pulses: Normal pulses.      Heart sounds: Normal heart sounds.   Pulmonary:      Effort: Pulmonary effort is normal.      Breath sounds: Normal breath sounds.   Abdominal:      General: Abdomen is flat. Bowel sounds are normal.   Musculoskeletal:         General: Normal range of motion.      Cervical back: Normal range of motion and neck supple.   Skin:     General: Skin is warm and dry.      Capillary Refill: Capillary refill takes less than 2 seconds.   Neurological:      General: No focal deficit present.      Mental Status: He is oriented to person, place, and time. Mental status is at baseline.   Psychiatric:         Mood and Affect: Mood normal.         Behavior: Behavior normal.         Thought Content: Thought content normal.         Judgment: Judgment normal.           Elevated " troponin    Confusion    Numbness of tongue    Acute bacterial endocarditis    Cerebrovascular accident (CVA) due to embolism of cerebral artery (CMS/HCC)    contijhue antbx under the diurection of inf dz---as per ct surgery
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
## 2021-08-15 NOTE — PAYOR COMM NOTE
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 (Last 24 hours) at 8/14/2021 1001  Last data filed at 8/14/2021 0357  Gross per 24 hour   Intake 420 ml   Output --   Net 420 ml         Physical Exam:    General Appearance:    Alert, cooperative, in no acute distress. Ill-appearing male.   HEENT:    Normocephalic. Atraumatic. PATRIC.   Neck:   Supple, trachea midline, no JVD. Adequate range of motion without pain.   Lungs:     Clear to auscultation, respirations regular, even and unlabored    Heart:    Regular rhythm and normal rate, normal S1 and S2, no murmur, no gallop, no rub, no click   Abdomen:     Normal bowel sounds, soft, non-tender, non-distended   Extremities:   Moves all extremities, no edema, no cyanosis, no redness   Pulses:   Pulses palpable and equal bilaterally   Skin:   No bleeding, bruising or rash   Neurologic:   Grossly intact            Results Review:   Lab Results (last 72 hours)     Procedure Component Value Units Date/Time    Blood Culture With DEBBIE - Blood, Arm, Right [[id number]] Collected: 08/13/21 0813    Specimen: Blood from Arm, Right Updated:  08/14/21 0830     Blood Culture No growth at 24 hours    Blood Culture With DEBBIE - Blood, Arm, Left [[id number]] Collected: 08/13/21 0813    Specimen: Blood from Arm, Left Updated: 08/14/21 0830     Blood Culture No growth at 24 hours    Blood Culture - Blood, Arm, Right [[id number]]  (Abnormal) Collected: 08/12/21 1111    Specimen: Blood from Arm, Right Updated: 08/14/21 0557     Blood Culture Growth present, too young to evaluate     Gram Stain Aerobic Bottle Gram positive cocci in chains    Blood Culture - Blood, Arm, Left [[id number]]  (Abnormal) Collected: 08/12/21 1111    Specimen: Blood from Arm, Left Updated: 08/14/21 0557     Blood Culture Growth present, too young to evaluate     Gram Stain Aerobic Bottle Gram positive cocci in chains    CBC (No Diff) [[id number]]  (Abnormal) Collected: 08/14/21 0429    Specimen: Blood Updated: 08/14/21 0506     WBC 9.41 10*3/mm3      RBC 3.85 10*6/mm3      Hemoglobin 10.8 g/dL      Hematocrit 33.7 %      MCV 87.5 fL      MCH 28.1 pg      MCHC 32.0 g/dL      RDW 12.2 %      RDW-SD 39.0 fl      MPV 10.4 fL      Platelets 325 10*3/mm3     Blood Culture ID, PCR - Blood, Arm, Left [[id number]]  (Abnormal) Collected: 08/12/21 1111    Specimen: Blood from Arm, Left Updated: 08/13/21 1454     BCID, PCR Enterococcus spp. Identification by BCID PCR.     BOTTLE TYPE Aerobic Bottle    Tropheryma Whipplei PCR [[id number]] Collected: 08/13/21 1305    Specimen: Blood Updated: 08/13/21 1309    Tularemia Antibody [[id number]] Collected: 08/13/21 0813    Specimen: Blood Updated: 08/13/21 0821    Bartonella Antibody Panel [[id number]] Collected: 08/13/21 0813    Specimen: Blood Updated: 08/13/21 0821    Bartonella, DNA, Amp Probe [[id number]] Collected: 08/13/21 0813    Specimen: Blood Updated: 08/13/21 0821    C-reactive Protein [[id number]]  (Abnormal) Collected: 08/13/21 0515    Specimen: Blood Updated: 08/13/21 0820     C-Reactive Protein 5.29 mg/dL     Sedimentation Rate [[id number]]  (Abnormal)  Collected: 08/13/21 0515    Specimen: Blood Updated: 08/13/21 0813     Sed Rate 41 mm/hr     Comprehensive Metabolic Panel [[id number]]  (Abnormal) Collected: 08/13/21 0515    Specimen: Blood Updated: 08/13/21 0625     Glucose 121 mg/dL      BUN 9 mg/dL      Creatinine 0.80 mg/dL      Sodium 135 mmol/L      Potassium 3.9 mmol/L      Chloride 102 mmol/L      CO2 25.0 mmol/L      Calcium 8.9 mg/dL      Total Protein 6.9 g/dL      Albumin 3.00 g/dL      ALT (SGPT) 30 U/L      AST (SGOT) 41 U/L      Alkaline Phosphatase 85 U/L      Total Bilirubin 0.4 mg/dL      eGFR Non African Amer 100 mL/min/1.73      Globulin 3.9 gm/dL      A/G Ratio 0.8 g/dL      BUN/Creatinine Ratio 11.3     Anion Gap 8.0 mmol/L     Narrative:      GFR Normal >60  Chronic Kidney Disease <60  Kidney Failure <15      CBC (No Diff) [[id number]]  (Abnormal) Collected: 08/13/21 0515    Specimen: Blood Updated: 08/13/21 0600     WBC 9.69 10*3/mm3      RBC 3.61 10*6/mm3      Hemoglobin 10.3 g/dL      Hematocrit 31.5 %      MCV 87.3 fL      MCH 28.5 pg      MCHC 32.7 g/dL      RDW 12.1 %      RDW-SD 38.9 fl      MPV 10.6 fL      Platelets 319 10*3/mm3     Comprehensive Metabolic Panel [[id number]]  (Abnormal) Collected: 08/12/21 1111    Specimen: Blood Updated: 08/12/21 1205     Glucose 121 mg/dL      BUN 11 mg/dL      Creatinine 0.78 mg/dL      Sodium 134 mmol/L      Potassium 4.0 mmol/L      Chloride 100 mmol/L      CO2 26.0 mmol/L      Calcium 9.3 mg/dL      Total Protein 7.2 g/dL      Albumin 3.20 g/dL      ALT (SGPT) 20 U/L      AST (SGOT) 28 U/L      Alkaline Phosphatase 88 U/L      Total Bilirubin 0.4 mg/dL      eGFR Non African Amer 103 mL/min/1.73      Globulin 4.0 gm/dL      A/G Ratio 0.8 g/dL      BUN/Creatinine Ratio 14.1     Anion Gap 8.0 mmol/L     Narrative:      GFR Normal >60  Chronic Kidney Disease <60  Kidney Failure <15      CBC & Differential [[id number]]  (Abnormal) Collected: 08/12/21 1111    Specimen: Blood Updated: 08/12/21 1138     Narrative:      The following orders were created for panel order CBC & Differential.  Procedure                               Abnormality         Status                     ---------                               -----------         ------                     CBC Auto Differential[[id number]]        Abnormal            Final result                 Please view results for these tests on the individual orders.    CBC Auto Differential [[id number]]  (Abnormal) Collected: 08/12/21 1111    Specimen: Blood Updated: 08/12/21 1138     WBC 9.08 10*3/mm3      RBC 3.92 10*6/mm3      Hemoglobin 11.0 g/dL      Hematocrit 34.0 %      MCV 86.7 fL      MCH 28.1 pg      MCHC 32.4 g/dL      RDW 12.1 %      RDW-SD 38.5 fl      MPV 10.3 fL      Platelets 366 10*3/mm3      Neutrophil % 81.2 %      Lymphocyte % 11.1 %      Monocyte % 5.7 %      Eosinophil % 1.2 %      Basophil % 0.2 %      Immature Grans % 0.6 %      Neutrophils, Absolute 7.37 10*3/mm3      Lymphocytes, Absolute 1.01 10*3/mm3      Monocytes, Absolute 0.52 10*3/mm3      Eosinophils, Absolute 0.11 10*3/mm3      Basophils, Absolute 0.02 10*3/mm3      Immature Grans, Absolute 0.05 10*3/mm3      nRBC 0.0 /100 WBC     Vitamin B12 [[id number]]  (Normal) Collected: 08/10/21 1038    Specimen: Blood Updated: 08/11/21 2306     Vitamin B-12 669 pg/mL     Narrative:      Results may be falsely increased if patient taking Biotin.             2D Echocardiogram:  · Left ventricular ejection fraction appears to be 31 - 35%. Left ventricular systolic function is moderately decreased.  · The left ventricular cavity is mildly dilated.  · Estimated right ventricular systolic pressure from tricuspid regurgitation is normal (<35 mmHg).  · Normal size and function of the right ventricle.  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       Medication Review: yes  Current Facility-Administered Medications   Medication Dose Route Frequency Provider Last Rate Last Admin   • acetaminophen (TYLENOL) tablet 650 mg  650 mg Oral Q6H PRN Shad Curiel MD   650 mg at 08/13/21 1449   • aspirin EC tablet 81 mg  81 mg Oral Daily Knees, Malgorzata E, APRN   81 mg at 08/13/21 0953   • atorvastatin (LIPITOR) tablet 40 mg  40 mg Oral Daily Knees, Malgorzata E, APRN   40 mg at 08/13/21 0953   • cefTRIAXone (ROCEPHIN) 2 g/100 mL 0.9% NS IVPB (MBP)  2 g Intravenous Q12H Elliott Rock MD   2 g at 08/13/21 2234   • clopidogrel (PLAVIX) tablet 75 mg  75 mg Oral Daily Shad Curiel MD   75 mg at 08/13/21 0954   • cyclobenzaprine (FLEXERIL) tablet 5 mg  5 mg Oral TID PRN Shad Curiel MD       • HYDROcodone-acetaminophen (NORCO) 5-325 MG per tablet 1 tablet  1 tablet Oral Q6H PRN Shad Curiel MD       • losartan (COZAAR) tablet 12.5 mg  12.5 mg Oral Q24H Knees, Malgorzata E, APRN   12.5 mg at 08/13/21 0951   • metoprolol succinate XL (TOPROL-XL) 24 hr tablet 25 mg  25 mg Oral Q24H Knees, Malgorzata E, APRN   25 mg at 08/13/21 0953   • sodium chloride 0.9 % flush 10 mL  10 mL Intravenous PRN Shad Curiel MD       • sodium chloride 0.9 % flush 10 mL  10 mL Intravenous Q12H Shad Curiel MD   10 mL at 08/13/21 2241   • sodium chloride 0.9 % flush 10 mL  10 mL Intravenous PRN Shad Curiel MD       • tamsulosin (FLOMAX) 24 hr capsule 0.4 mg  0.4 mg Oral Daily Shad Curiel MD   0.4 mg at 08/13/21 0954   • vancomycin 1500 mg/500 mL 0.9% NS IVPB ([facility])  15 mg/kg Intravenous Q12H Angelo Johnson MD   1,500 mg at 08/14/21 030         Assessment/Plan       Acute bacterial endocarditis, Mitral Valve - on Merrem and Rocephin. ID Following. CT surgery consulted.     Cerebrovascular accident (CVA) due to embolism of cerebral artery  (CMS/HCC) - No residual deficits at present    CAD - S/P KELLY x 3 to RCA by Dr. Thomas at [facility] on 05/27/2021    Paroxysmal Atrial Fibrillation - S/P Ablation, Unsuccessful in November 2019 at [facility]    Chronic Systolic CHF - Currently Compensated. On BB, ARB. Consider LifeVest at Discharge    DVT, RLE - Suspect Embolic from Endocarditis    Hx MV Repair (Minimally Invasive) by Dr. Telles at [facility]      Continue current treatment. Await CT surgery recommendations.     LUDWIG Kimbrough  08/14/21  10:01 CDT              Electronically signed by Alma Rosa Lewis APRN at 08/14/21 1024       Consult Notes (last 24 hours) (Notes from 08/13/21 1950 through 08/14/21 1950)    No notes of this type exist for this encounter.

## 2021-08-15 NOTE — PROGRESS NOTES
"CC:\"im feeling better\"    Review of Systems   Constitutional: Positive for activity change and fatigue.   HENT: Negative.    Eyes: Negative.    Respiratory: Negative.    Cardiovascular: Negative.    Gastrointestinal: Negative.    Endocrine: Negative.    Genitourinary: Negative.    Musculoskeletal: Negative.    Skin: Negative.    Allergic/Immunologic: Negative.    Neurological: Negative.    Hematological: Negative.    Psychiatric/Behavioral: Negative.      Temp:  [97.4 °F (36.3 °C)-98.4 °F (36.9 °C)] 97.4 °F (36.3 °C)  Heart Rate:  [80-85] 80  Resp:  [16-18] 16  BP: (109-129)/(55-68) 109/64  I/O last 3 completed shifts:  In: 720 [P.O.:720]  Out: 1450 [Urine:1450]  No intake/output data recorded.    Physical Exam  Vitals and nursing note reviewed.   Constitutional:       Appearance: Normal appearance. He is normal weight.   HENT:      Head: Normocephalic and atraumatic.      Nose: Nose normal.      Mouth/Throat:      Mouth: Mucous membranes are moist.      Pharynx: Oropharynx is clear.   Eyes:      Extraocular Movements: Extraocular movements intact.      Conjunctiva/sclera: Conjunctivae normal.      Pupils: Pupils are equal, round, and reactive to light.   Cardiovascular:      Rate and Rhythm: Normal rate and regular rhythm.      Pulses: Normal pulses.      Heart sounds: Normal heart sounds.   Pulmonary:      Effort: Pulmonary effort is normal.      Breath sounds: Normal breath sounds.   Abdominal:      General: Abdomen is flat. Bowel sounds are normal.      Palpations: Abdomen is soft.   Musculoskeletal:         General: Normal range of motion.      Cervical back: Normal range of motion and neck supple.   Skin:     General: Skin is warm and dry.      Capillary Refill: Capillary refill takes less than 2 seconds.   Neurological:      General: No focal deficit present.      Mental Status: He is alert and oriented to person, place, and time. Mental status is at baseline.   Psychiatric:         Mood and Affect: Mood " normal.         Behavior: Behavior normal.         Thought Content: Thought content normal.         Judgment: Judgment normal.           Elevated troponin    Confusion    Numbness of tongue    Acute bacterial endocarditis    Cerebrovascular accident (CVA) due to embolism of cerebral artery (CMS/HCC)  continuing absx---d/w dr gray--rpeat venous scan noted--appreciate help of consultants

## 2021-08-16 LAB
ANION GAP SERPL CALCULATED.3IONS-SCNC: 5 MMOL/L (ref 5–15)
B HENSELAE IGG TITR SER IF: NEGATIVE TITER
B HENSELAE IGM TITR SER IF: NEGATIVE TITER
B QUINTANA IGG TITR SER IF: NEGATIVE TITER
B QUINTANA IGM TITR SER IF: NEGATIVE TITER
BACTERIA SPEC AEROBE CULT: ABNORMAL
BUN SERPL-MCNC: 11 MG/DL (ref 6–20)
BUN/CREAT SERPL: 13.8 (ref 7–25)
CALCIUM SPEC-SCNC: 8.8 MG/DL (ref 8.6–10.5)
CHLORIDE SERPL-SCNC: 104 MMOL/L (ref 98–107)
CO2 SERPL-SCNC: 27 MMOL/L (ref 22–29)
CREAT SERPL-MCNC: 0.8 MG/DL (ref 0.76–1.27)
DEPRECATED RDW RBC AUTO: 39.7 FL (ref 37–54)
ERYTHROCYTE [DISTWIDTH] IN BLOOD BY AUTOMATED COUNT: 12.4 % (ref 12.3–15.4)
GFR SERPL CREATININE-BSD FRML MDRD: 100 ML/MIN/1.73
GLUCOSE SERPL-MCNC: 101 MG/DL (ref 65–99)
GRAM STN SPEC: ABNORMAL
HCT VFR BLD AUTO: 30.4 % (ref 37.5–51)
HGB BLD-MCNC: 9.7 G/DL (ref 13–17.7)
ISOLATED FROM: ABNORMAL
MCH RBC QN AUTO: 28 PG (ref 26.6–33)
MCHC RBC AUTO-ENTMCNC: 31.9 G/DL (ref 31.5–35.7)
MCV RBC AUTO: 87.9 FL (ref 79–97)
PLATELET # BLD AUTO: 320 10*3/MM3 (ref 140–450)
PMV BLD AUTO: 10.5 FL (ref 6–12)
POTASSIUM SERPL-SCNC: 4.1 MMOL/L (ref 3.5–5.2)
RBC # BLD AUTO: 3.46 10*6/MM3 (ref 4.14–5.8)
SODIUM SERPL-SCNC: 136 MMOL/L (ref 136–145)
VANCOMYCIN TROUGH SERPL-MCNC: 18.1 MCG/ML (ref 5–20)
WBC # BLD AUTO: 8.18 10*3/MM3 (ref 3.4–10.8)

## 2021-08-16 PROCEDURE — 80048 BASIC METABOLIC PNL TOTAL CA: CPT | Performed by: FAMILY MEDICINE

## 2021-08-16 PROCEDURE — 25010000002 VANCOMYCIN 10 G RECONSTITUTED SOLUTION: Performed by: FAMILY MEDICINE

## 2021-08-16 PROCEDURE — 80202 ASSAY OF VANCOMYCIN: CPT | Performed by: FAMILY MEDICINE

## 2021-08-16 PROCEDURE — 85027 COMPLETE CBC AUTOMATED: CPT | Performed by: PSYCHIATRY & NEUROLOGY

## 2021-08-16 PROCEDURE — 99232 SBSQ HOSP IP/OBS MODERATE 35: CPT | Performed by: PSYCHIATRY & NEUROLOGY

## 2021-08-16 PROCEDURE — 25010000002 CEFTRIAXONE PER 250 MG: Performed by: INTERNAL MEDICINE

## 2021-08-16 PROCEDURE — 99232 SBSQ HOSP IP/OBS MODERATE 35: CPT | Performed by: THORACIC SURGERY (CARDIOTHORACIC VASCULAR SURGERY)

## 2021-08-16 PROCEDURE — 99231 SBSQ HOSP IP/OBS SF/LOW 25: CPT | Performed by: FAMILY MEDICINE

## 2021-08-16 RX ADMIN — VANCOMYCIN HYDROCHLORIDE 1250 MG: 10 INJECTION, POWDER, LYOPHILIZED, FOR SOLUTION INTRAVENOUS at 06:16

## 2021-08-16 RX ADMIN — VANCOMYCIN HYDROCHLORIDE 1250 MG: 10 INJECTION, POWDER, LYOPHILIZED, FOR SOLUTION INTRAVENOUS at 22:03

## 2021-08-16 RX ADMIN — SODIUM CHLORIDE, PRESERVATIVE FREE 10 ML: 5 INJECTION INTRAVENOUS at 08:35

## 2021-08-16 RX ADMIN — METOPROLOL SUCCINATE 25 MG: 25 TABLET, FILM COATED, EXTENDED RELEASE ORAL at 08:35

## 2021-08-16 RX ADMIN — VANCOMYCIN HYDROCHLORIDE 1250 MG: 10 INJECTION, POWDER, LYOPHILIZED, FOR SOLUTION INTRAVENOUS at 14:58

## 2021-08-16 RX ADMIN — CLOPIDOGREL 75 MG: 75 TABLET, FILM COATED ORAL at 08:35

## 2021-08-16 RX ADMIN — SODIUM CHLORIDE, PRESERVATIVE FREE 10 ML: 5 INJECTION INTRAVENOUS at 20:30

## 2021-08-16 RX ADMIN — ASPIRIN 81 MG: 81 TABLET ORAL at 08:35

## 2021-08-16 RX ADMIN — LOSARTAN POTASSIUM 12.5 MG: 25 TABLET, FILM COATED ORAL at 08:35

## 2021-08-16 RX ADMIN — CEFTRIAXONE 2 G: 2 INJECTION, POWDER, FOR SOLUTION INTRAMUSCULAR; INTRAVENOUS at 08:30

## 2021-08-16 RX ADMIN — ATORVASTATIN CALCIUM 40 MG: 40 TABLET, FILM COATED ORAL at 08:35

## 2021-08-16 RX ADMIN — TAMSULOSIN HYDROCHLORIDE 0.4 MG: 0.4 CAPSULE ORAL at 08:35

## 2021-08-16 RX ADMIN — CEFTRIAXONE 2 G: 2 INJECTION, POWDER, FOR SOLUTION INTRAMUSCULAR; INTRAVENOUS at 20:22

## 2021-08-16 NOTE — PROGRESS NOTES
"Infectious Diseases Progress Note    Patient:  Robert Piedra  YOB: 1965  MRN: [id number]   Admit date: 8/10/2021   Admitting Physician: Shad Curiel MD  Primary Care Physician: Shad Curiel MD    Chief Complaint/Interval History: He feels okay.  No new symptoms.  No symptoms to suggest TIA.  No rash or skin itching.  No diarrhea.  No chest pain or shortness of breath.  No orthopnea or PND.  Interval notes reviewed.    Intake/Output Summary (Last 24 hours) at 8/15/2021 1922  Last data filed at 8/15/2021 0325  Gross per 24 hour   Intake 300 ml   Output 1450 ml   Net -1150 ml     Allergies:   Allergies   Allergen Reactions   • Penicillins Hives     Current Scheduled Medications:   aspirin, 81 mg, Oral, Daily  atorvastatin, 40 mg, Oral, Daily  cefTRIAXone, 2 g, Intravenous, Q12H  clopidogrel, 75 mg, Oral, Daily  losartan, 12.5 mg, Oral, Q24H  metoprolol succinate XL, 25 mg, Oral, Q24H  sodium chloride, 10 mL, Intravenous, Q12H  tamsulosin, 0.4 mg, Oral, Daily  vancomycin, 1,250 mg, Intravenous, Q8H      Current PRN Medications:  •  acetaminophen  •  cyclobenzaprine  •  HYDROcodone-acetaminophen  •  [COMPLETED] Insert peripheral IV **AND** sodium chloride  •  sodium chloride    Review of Systems see HPI    Vital Signs:  /69 (BP Location: Left arm, Patient Position: Lying)   Pulse 89   Temp 97.4 °F (36.3 °C) (Oral)   Resp 16   Ht 195.6 cm (77\")   Wt 97.5 kg (215 lb)   SpO2 96%   BMI 25.50 kg/m²     Physical Exam  Vital signs - reviewed.  Line/IV (PICC) site - No erythema, warmth, induration, or tenderness.  Sclera nonicteric without hemorrhage  Extremities no splinter hemorrhages or [place] lesions  Lungs clear without crackles  Heart exam without change  Extremities trace bilateral symmetrical lower extremity edema    Lab Results:  CBC:   Results from last 7 days   Lab Units 08/15/21  0157 08/14/21  0429 08/13/21  0515 08/12/21  1111 08/11/21  1302 08/10/21  0959   WBC 10*3/mm3 " 9.08 9.41 9.69 9.08  --  8.53   HEMOGLOBIN g/dL 10.0* 10.8* 10.3* 11.0*  --  12.2*   HEMATOCRIT % 31.3* 33.7* 31.5* 34.0* 36.7* 37.2*   PLATELETS 10*3/mm3 328 325 319 366 383 364     BMP:  Results from last 7 days   Lab Units 08/13/21  0515 08/12/21  1111 08/12/21  1111 08/10/21  1038 08/10/21  1038   SODIUM mmol/L 135*  --  134*  --  135*   POTASSIUM mmol/L 3.9  --  4.0  --  4.2   CHLORIDE mmol/L 102  --  100  --  100   CO2 mmol/L 25.0  --  26.0  --  25.0   BUN mg/dL 9  --  11  --  11   CREATININE mg/dL 0.80  --  0.78  --  0.84   GLUCOSE mg/dL 121*   < > 121*   < > 110*   CALCIUM mg/dL 8.9  --  9.3  --  9.1   ALT (SGPT) U/L 30  --  20  --  14    < > = values in this interval not displayed.     Culture Results:   Blood Culture   Date Value Ref Range Status   08/13/2021 Growth present, too young to evaluate  Preliminary   08/13/2021 Growth present, too young to evaluate  Preliminary   08/12/2021 Enterococcus species (C)  Preliminary     Comment:     Infectious disease consultation is highly recommended.   08/12/2021 Enterococcus species (C)  Preliminary     Comment:     Infectious disease consultation is highly recommended.   Susceptibility for the Enterococcus remains pending    Radiology: None  Additional Studies Reviewed: None    Impression:   Enterococcal endocarditis  Evidence for previous embolic events on MRI brain and CT abdomen  History of mitral valve surgery  Congestive heart failure  Coronary artery disease  Jehovah's witness    Recommendations:   Continue vancomycin  Continue ceftriaxone  Agree with anticoagulation recommendations per neurology  Follow-up blood cultures to make sure bacteremia clearing  Await final ID and susceptibility for blood culture  Monitor for CHF, symptoms suggestive of other embolic event, or other metastatic focus of infection  Continue to follow    Elliott Castro MD

## 2021-08-16 NOTE — PROGRESS NOTES
CC:no new nursing staff concerns voiced--resting quietly watching tv    Review of Systems   Constitutional: Negative.  Negative for activity change.   HENT: Negative.    Eyes: Negative.    Respiratory: Negative.    Cardiovascular: Negative.    Gastrointestinal: Negative.    Endocrine: Negative.    Genitourinary: Negative.    Musculoskeletal: Negative.    Skin: Negative.    Allergic/Immunologic: Negative.    Neurological: Negative.    Hematological: Negative.    Psychiatric/Behavioral: Negative.      Temp:  [97.4 °F (36.3 °C)-98.6 °F (37 °C)] 98.5 °F (36.9 °C)  Heart Rate:  [] 78  Resp:  [16] 16  BP: ()/(63-71) 112/64  I/O last 3 completed shifts:  In: 420 [P.O.:420]  Out: 2675 [Urine:2675]  No intake/output data recorded.    Physical Exam  Vitals reviewed.   Constitutional:       Appearance: Normal appearance. He is normal weight.   HENT:      Head: Normocephalic and atraumatic.      Nose: Nose normal.      Mouth/Throat:      Mouth: Mucous membranes are moist.      Pharynx: Oropharynx is clear.   Eyes:      Extraocular Movements: Extraocular movements intact.      Conjunctiva/sclera: Conjunctivae normal.      Pupils: Pupils are equal, round, and reactive to light.   Cardiovascular:      Rate and Rhythm: Normal rate and regular rhythm.      Pulses: Normal pulses.      Heart sounds: Normal heart sounds.   Pulmonary:      Effort: Pulmonary effort is normal.      Breath sounds: Normal breath sounds.   Abdominal:      General: Abdomen is flat. Bowel sounds are normal.      Palpations: Abdomen is soft.   Musculoskeletal:         General: Normal range of motion.      Cervical back: Normal range of motion and neck supple.   Skin:     General: Skin is warm and dry.      Capillary Refill: Capillary refill takes less than 2 seconds.   Neurological:      General: No focal deficit present.      Mental Status: He is alert and oriented to person, place, and time. Mental status is at baseline.   Psychiatric:          Mood and Affect: Mood normal.         Behavior: Behavior normal.         Thought Content: Thought content normal.         Judgment: Judgment normal.           Elevated troponin    Confusion    Numbness of tongue    Acute bacterial endocarditis    Cerebrovascular accident (CVA) due to embolism of cerebral artery (CMS/HCC)  continue antbx directed by id
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
## 2021-08-16 NOTE — PROGRESS NOTES
"Infectious Diseases Progress Note    Patient:  Robert Piedra  YOB: 1965  MRN: [id number]   Admit date: 8/10/2021   Admitting Physician: Shad Curiel MD  Primary Care Physician: Shad Curiel MD    Chief Complaint/Interval History: He remains without fever.  No new neurological symptoms.  Hemodynamically stable.  No rash or diarrhea.    Intake/Output Summary (Last 24 hours) at 8/16/2021 0655  Last data filed at 8/16/2021 0537  Gross per 24 hour   Intake 120 ml   Output 1225 ml   Net -1105 ml     Allergies:   Allergies   Allergen Reactions   • Penicillins Hives     Current Scheduled Medications:   aspirin, 81 mg, Oral, Daily  atorvastatin, 40 mg, Oral, Daily  cefTRIAXone, 2 g, Intravenous, Q12H  clopidogrel, 75 mg, Oral, Daily  losartan, 12.5 mg, Oral, Q24H  metoprolol succinate XL, 25 mg, Oral, Q24H  sodium chloride, 10 mL, Intravenous, Q12H  tamsulosin, 0.4 mg, Oral, Daily  vancomycin, 1,250 mg, Intravenous, Q8H      Current PRN Medications:  •  acetaminophen  •  cyclobenzaprine  •  HYDROcodone-acetaminophen  •  [COMPLETED] Insert peripheral IV **AND** sodium chloride  •  sodium chloride    Review of Systems see HPI    Vital Signs:  /64 (BP Location: Left arm, Patient Position: Lying)   Pulse 78   Temp 98.5 °F (36.9 °C) (Oral)   Resp 16   Ht 195.6 cm (77\")   Wt 98.1 kg (216 lb 3.2 oz)   SpO2 95%   BMI 25.64 kg/m²     Physical Exam  Vital signs - reviewed.  Line/IV site - No erythema, warmth, induration, or tenderness.  No splinter hemorrhages or [place] lesions  Lungs without crackles    Lab Results:  CBC:   Results from last 7 days   Lab Units 08/16/21  0223 08/15/21  0157 08/14/21  0429 08/13/21  0515 08/12/21  1111 08/11/21  1302 08/10/21  0959   WBC 10*3/mm3 8.18 9.08 9.41 9.69 9.08  --  8.53   HEMOGLOBIN g/dL 9.7* 10.0* 10.8* 10.3* 11.0*  --  12.2*   HEMATOCRIT % 30.4* 31.3* 33.7* 31.5* 34.0* 36.7* 37.2*   PLATELETS 10*3/mm3 320 328 325 319 366 383 364 "     BMP:  Results from last 7 days   Lab Units 08/16/21  0223 08/13/21  0515 08/13/21  0515 08/12/21  1111 08/12/21  1111 08/10/21  1038 08/10/21  1038   SODIUM mmol/L 136  --  135*  --  134*  --  135*   POTASSIUM mmol/L 4.1  --  3.9  --  4.0  --  4.2   CHLORIDE mmol/L 104  --  102  --  100  --  100   CO2 mmol/L 27.0  --  25.0  --  26.0  --  25.0   BUN mg/dL 11  --  9  --  11  --  11   CREATININE mg/dL 0.80  --  0.80  --  0.78  --  0.84   GLUCOSE mg/dL 101*   < > 121*   < > 121*   < > 110*   CALCIUM mg/dL 8.8  --  8.9  --  9.3  --  9.1   ALT (SGPT) U/L  --   --  30  --  20  --  14    < > = values in this interval not displayed.     Culture Results:   Blood cultures August 15, 2021-no growth to date    Blood Culture   Date Value Ref Range Status   08/13/2021 Growth present, too young to evaluate  Preliminary   08/13/2021 Growth present, too young to evaluate  Preliminary   08/12/2021 Enterococcus species (C)  Preliminary     Comment:     Infectious disease consultation is highly recommended.   08/12/2021 Enterococcus species (C)  Preliminary     Comment:     Infectious disease consultation is highly recommended.     Reviewed blood cultures from August 12, 2021 (both sets) and August 13, 2021 (both sets)-the August 12 cultures remain Enterococcus species with final ID and susceptibility pending.  Are listed as gram-positive cocci in chains in both bottles with growth too young to evaluate.    Radiology: None    Additional Studies Reviewed: None    Impression:   Enterococcal endocarditis-awaiting final ID and susceptibility.    Recommendations:   Continue vancomycin and ceftriaxone  Continue supportive care  Follow-up blood cultures  Watch for metastatic foci of infection/embolic stigmata/CHF    Elliott Castro MD

## 2021-08-16 NOTE — PLAN OF CARE
Goal Outcome Evaluation:  Plan of Care Reviewed With: patient        Progress: no change  Outcome Summary: PATIENT ALERT AND ORIENTED. NEUROS WNL. NO C/O PAIN. SINUS PER TELE. ANTIBIOTICS CONTINUE. PATIENT IN/OUT CATHS SELF AS NEEDED. CONT TO MONITOR.
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
## 2021-08-16 NOTE — PROGRESS NOTES
"Pharmacy Dosing Service  Pharmacokinetics  Vancomycin Follow-up Evaluation    Assessment/Action/Plan:      AUC Model Data:  Regimen: 1250 mg IV every 8 hours.  Exposure target: AUC24 (range)400-600 mg/L.hr   AUC24,ss: 507 mg/L.hr  PAUC*: 90 %  Ctrough,ss: 14.5 mg/L  Pconc*: 9 %  Tox.: 10 %    Calculated AUC based on trough level today within therapeutic range. Continue current dosing of 1250mg IV q8. Pharmacy will continue to follow daily and adjust dose accordingly.     Subjective:  Robert Piedra is a 56 y.o. male currently on Vancomycin 1250 mg IV every 8 hours for the treatment of endocarditis, day 5 of therapy. Current end date: 8/19/21    Objective:  Ht: 195.6 cm (77\"); Wt: 98.1 kg (216 lb 3.2 oz)  Estimated Creatinine Clearance: 141.4 mL/min (by C-G formula based on SCr of 0.8 mg/dL).   Creatinine   Date Value Ref Range Status   08/16/2021 0.80 0.76 - 1.27 mg/dL Final   08/13/2021 0.80 0.76 - 1.27 mg/dL Final   08/12/2021 0.78 0.76 - 1.27 mg/dL Final   08/02/2021 1 0.5 - 1.2 mg/dL Final   06/01/2021 0.9 0.5 - 1.2 mg/dL Final   05/27/2021 0.9 0.5 - 1.2 mg/dL Final      Lab Results   Component Value Date    WBC 8.18 08/16/2021    WBC 9.08 08/15/2021    WBC 9.41 08/14/2021         Lab Results   Component Value Date    VANCOTROUGH 18.10 08/16/2021         Culture Results:  Microbiology Results (last 10 days)       Procedure Component Value - Date/Time    Blood Culture With DEBBIE - Blood, Arm, Left [[id number]] Collected: 08/15/21 2042    Lab Status: Preliminary result Specimen: Blood from Arm, Left Updated: 08/16/21 0900     Blood Culture No growth at less than 24 hours    Blood Culture With DEBBIE - Blood, Arm, Left [[id number]] Collected: 08/15/21 2040    Lab Status: Preliminary result Specimen: Blood from Arm, Left Updated: 08/16/21 0900     Blood Culture No growth at less than 24 hours    Blood Culture With DEBBIE - Blood, Arm, Right [[id number]]  (Abnormal) Collected: 08/13/21 0813    Lab Status: Preliminary result " Specimen: Blood from Arm, Right Updated: 08/15/21 0532     Blood Culture Growth present, too young to evaluate     Gram Stain Aerobic Bottle Gram positive cocci in chains      Anaerobic Bottle Gram positive cocci    Blood Culture With DEBBIE - Blood, Arm, Left [[id number]]  (Abnormal) Collected: 08/13/21 0813    Lab Status: Preliminary result Specimen: Blood from Arm, Left Updated: 08/15/21 0531     Blood Culture Growth present, too young to evaluate     Gram Stain Aerobic Bottle Gram positive cocci in chains      Anaerobic Bottle Gram positive cocci    Blood Culture - Blood, Arm, Right [[id number]]  (Abnormal) Collected: 08/12/21 1111    Lab Status: Preliminary result Specimen: Blood from Arm, Right Updated: 08/15/21 0534     Blood Culture Enterococcus species     Comment: Infectious disease consultation is highly recommended.        Isolated from Aerobic Bottle     Gram Stain Aerobic Bottle Gram positive cocci in chains    Blood Culture - Blood, Arm, Left [[id number]]  (Abnormal) Collected: 08/12/21 1111    Lab Status: Preliminary result Specimen: Blood from Arm, Left Updated: 08/15/21 0533     Blood Culture Enterococcus species     Comment: Infectious disease consultation is highly recommended.        Isolated from Aerobic Bottle     Gram Stain Aerobic Bottle Gram positive cocci in chains    Blood Culture ID, PCR - Blood, Arm, Left [[id number]]  (Abnormal) Collected: 08/12/21 1111    Lab Status: Final result Specimen: Blood from Arm, Left Updated: 08/13/21 1454     BCID, PCR Enterococcus spp. Identification by BCID PCR.     BOTTLE TYPE Aerobic Bottle    COVID-19,[facility] IN-HOUSE,Nasal Swab No Transport Media 3-4 HR TAT - Swab, Nasal Cavity [[id number]]  (Normal) Collected: 08/10/21 1009    Lab Status: Final result Specimen: Swab from Nasal Cavity Updated: 08/10/21 1109     COVID19 Not Detected    Narrative:      Fact sheet for providers: https://www.fda.gov/media/043969/download     Fact sheet for patients:  https://www.fda.gov/media/463950/download    Test performed by PCR.    Consider negative results in combination with clinical observations, patient history, and epidemiological information.  Fact sheet for providers: https://www.fda.gov/media/016773/download     Fact sheet for patients: https://www.fda.gov/media/839939/download    Test performed by PCR.    Consider negative results in combination with clinical observations, patient history, and epidemiological information.            AZEEM Aldrich PharmD   08/16/21 12:37 CDT

## 2021-08-17 LAB
DEPRECATED RDW RBC AUTO: 39.7 FL (ref 37–54)
ERYTHROCYTE [DISTWIDTH] IN BLOOD BY AUTOMATED COUNT: 12.4 % (ref 12.3–15.4)
HCT VFR BLD AUTO: 31.9 % (ref 37.5–51)
HGB BLD-MCNC: 10.4 G/DL (ref 13–17.7)
MCH RBC QN AUTO: 28.6 PG (ref 26.6–33)
MCHC RBC AUTO-ENTMCNC: 32.6 G/DL (ref 31.5–35.7)
MCV RBC AUTO: 87.6 FL (ref 79–97)
PLATELET # BLD AUTO: 336 10*3/MM3 (ref 140–450)
PMV BLD AUTO: 10.3 FL (ref 6–12)
RBC # BLD AUTO: 3.64 10*6/MM3 (ref 4.14–5.8)
WBC # BLD AUTO: 6.22 10*3/MM3 (ref 3.4–10.8)

## 2021-08-17 PROCEDURE — 99232 SBSQ HOSP IP/OBS MODERATE 35: CPT | Performed by: CLINICAL NURSE SPECIALIST

## 2021-08-17 PROCEDURE — 99231 SBSQ HOSP IP/OBS SF/LOW 25: CPT | Performed by: FAMILY MEDICINE

## 2021-08-17 PROCEDURE — 85027 COMPLETE CBC AUTOMATED: CPT | Performed by: PSYCHIATRY & NEUROLOGY

## 2021-08-17 PROCEDURE — 25010000002 VANCOMYCIN 10 G RECONSTITUTED SOLUTION: Performed by: FAMILY MEDICINE

## 2021-08-17 PROCEDURE — 25010000002 CEFTRIAXONE PER 250 MG: Performed by: INTERNAL MEDICINE

## 2021-08-17 RX ADMIN — TAMSULOSIN HYDROCHLORIDE 0.4 MG: 0.4 CAPSULE ORAL at 08:32

## 2021-08-17 RX ADMIN — VANCOMYCIN HYDROCHLORIDE 1250 MG: 10 INJECTION, POWDER, LYOPHILIZED, FOR SOLUTION INTRAVENOUS at 06:19

## 2021-08-17 RX ADMIN — LOSARTAN POTASSIUM 12.5 MG: 25 TABLET, FILM COATED ORAL at 08:31

## 2021-08-17 RX ADMIN — VANCOMYCIN HYDROCHLORIDE 1250 MG: 10 INJECTION, POWDER, LYOPHILIZED, FOR SOLUTION INTRAVENOUS at 13:32

## 2021-08-17 RX ADMIN — METOPROLOL SUCCINATE 25 MG: 25 TABLET, FILM COATED, EXTENDED RELEASE ORAL at 08:31

## 2021-08-17 RX ADMIN — CLOPIDOGREL 75 MG: 75 TABLET, FILM COATED ORAL at 08:32

## 2021-08-17 RX ADMIN — ATORVASTATIN CALCIUM 40 MG: 40 TABLET, FILM COATED ORAL at 08:32

## 2021-08-17 RX ADMIN — ASPIRIN 81 MG: 81 TABLET ORAL at 08:32

## 2021-08-17 RX ADMIN — CEFTRIAXONE 2 G: 2 INJECTION, POWDER, FOR SOLUTION INTRAMUSCULAR; INTRAVENOUS at 08:30

## 2021-08-17 RX ADMIN — VANCOMYCIN HYDROCHLORIDE 1250 MG: 10 INJECTION, POWDER, LYOPHILIZED, FOR SOLUTION INTRAVENOUS at 21:22

## 2021-08-17 RX ADMIN — SODIUM CHLORIDE, PRESERVATIVE FREE 10 ML: 5 INJECTION INTRAVENOUS at 08:32

## 2021-08-17 RX ADMIN — SODIUM CHLORIDE, PRESERVATIVE FREE 10 ML: 5 INJECTION INTRAVENOUS at 20:13

## 2021-08-17 RX ADMIN — CEFTRIAXONE 2 G: 2 INJECTION, POWDER, FOR SOLUTION INTRAMUSCULAR; INTRAVENOUS at 20:13

## 2021-08-17 NOTE — CASE MANAGEMENT/SOCIAL WORK
Continued Stay Note   [facility]     Patient Name: Robert Piedra  MRN: [id number]  Today's Date: 8/17/2021    Admit Date: 8/10/2021    Discharge Plan     Row Name 08/17/21 1020       Plan    Plan Comments  PT awaiting potential transfer to [place]. Will continue to follow and assist as needed.        Discharge Codes    No documentation.             GEORGINA Graham

## 2021-08-17 NOTE — PROGRESS NOTES
"CC:\"im feeling good\"--denies fever or chills    Review of Systems   Constitutional: Positive for activity change.   HENT: Negative.    Eyes: Negative.    Respiratory: Negative.    Cardiovascular: Negative.    Gastrointestinal: Negative.    Endocrine: Negative.    Genitourinary: Negative.    Musculoskeletal: Negative.    Skin: Negative.    Allergic/Immunologic: Negative.    Neurological: Negative.    Hematological: Negative.    Psychiatric/Behavioral: Negative.      Temp:  [97.9 °F (36.6 °C)-99 °F (37.2 °C)] 97.9 °F (36.6 °C)  Heart Rate:  [72-84] 83  Resp:  [16-18] 18  BP: (100-120)/(53-69) 102/55  I/O last 3 completed shifts:  In: 820 [P.O.:720; IV Piggyback:100]  Out: 1225 [Urine:1225]  I/O this shift:  In: 240 [P.O.:240]  Out: 600 [Urine:600]    Physical Exam  Vitals and nursing note reviewed.   Constitutional:       Appearance: Normal appearance. He is normal weight.   HENT:      Head: Normocephalic and atraumatic.      Mouth/Throat:      Mouth: Mucous membranes are moist.   Eyes:      Pupils: Pupils are equal, round, and reactive to light.   Cardiovascular:      Rate and Rhythm: Normal rate and regular rhythm.      Pulses: Normal pulses.      Heart sounds: Normal heart sounds.   Pulmonary:      Effort: Pulmonary effort is normal.      Breath sounds: Normal breath sounds.   Abdominal:      General: Abdomen is flat. Bowel sounds are normal.      Palpations: Abdomen is soft.   Musculoskeletal:         General: Normal range of motion.      Cervical back: Normal range of motion and neck supple.   Skin:     General: Skin is warm and dry.      Capillary Refill: Capillary refill takes less than 2 seconds.   Neurological:      General: No focal deficit present.      Mental Status: He is alert and oriented to person, place, and time. Mental status is at baseline.   Psychiatric:         Mood and Affect: Mood normal.         Behavior: Behavior normal.         Thought Content: Thought content normal.         Judgment: " Judgment normal.           Elevated troponin    Confusion    Numbness of tongue    Acute bacterial endocarditis    Cerebrovascular accident (CVA) due to embolism of cerebral artery (CMS/HCC)    He plans on talking with dr hawkins regarding further plans this evening---continue antbx for now

## 2021-08-17 NOTE — PLAN OF CARE
Goal Outcome Evaluation:  Plan of Care Reviewed With: patient           Outcome Summary: VSS. Sinus/sinus tach  w/couplet PVCs. No c/o pain. A/o x4. Pt's plan is to go to [place] for surgery. Safety maintained.
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
## 2021-08-17 NOTE — PROGRESS NOTES
"Chief Complaint   Patient presents with   • Chest Pain     Events over the weekend and documentation have been reviewed. He feels quite well at this time. His wife was available via telephone.    Visit Vitals  /61 (BP Location: Left arm, Patient Position: Lying)   Pulse 80   Temp 98.7 °F (37.1 °C) (Oral)   Resp 18   Ht 195.6 cm (77\")   Wt 98.1 kg (216 lb 3.2 oz)   SpO2 99%   BMI 25.64 kg/m²         Intake/Output Summary (Last 24 hours) at 8/16/2021 2119  Last data filed at 8/16/2021 1331  Gross per 24 hour   Intake 820 ml   Output 1225 ml   Net -405 ml       Physical Exam:    General: No acute distress, in good spirits  Cardiovascular: RRR, no murmur, rubs, or gallops. No JVD  Pulmonary: Clear to auscultation bilaterally, no wheezing, rubs, or rales.  Abdomen: Soft, non distended, and non tender.  Extremities: Warm, moves all extremities. No lower extremity edema  Neurologic:  No focal deficits, CN II-XII intact grossly.      Impression:  Endocarditis  Multiple embolic strokes  Right lower extremity deep venous thrombosis  Atrial fibrillation post surgical and percutaneous ablations  Splenic infarcts  Status post mitral valve repair (annuloplasty ring is present)  Enterococcus bacteremia  Adventism  Aspirin like platelet defect  Chronic systolic heart failure    Medical decision-making/recommendations/plan:  Case is discussed with Dr. Owusu today.  Ultimately given the complexity of this patient's care and the close proximity to the original operation with probable subacute to possibly chronic infective endocarditis discussion with the patient he is okay with transfer to [facility]. We discussed that bed availability may be problematic. We'll plan to discuss with Dr. Curiel for transfer.  Discussed at length with patient and wife.  Total time 24 minutes with greater than 50% time counseling the patient.    "

## 2021-08-17 NOTE — PLAN OF CARE
Goal Outcome Evaluation: Outcome Summary: Length of stay assessment completed. % x 2 meals, 2 snacks. Start routine nighttime snack for optimal nutrition.

## 2021-08-17 NOTE — PLAN OF CARE
Goal Outcome Evaluation:   Patient did not c/o pain this shift. Patient's VSS/ RA, Sinus 79-89 with pvc per tele.  Patient does self catheterized as needed. SUNIL scale = 0. Stroke education given to patient. Awaiting for transfer to [facility] any time soon? PICC line c/d/I. Continue abx and other tx. Continue to monitor pt and labs.

## 2021-08-18 LAB
BACTERIA SPEC AEROBE CULT: ABNORMAL
DEPRECATED RDW RBC AUTO: 39.2 FL (ref 37–54)
ERYTHROCYTE [DISTWIDTH] IN BLOOD BY AUTOMATED COUNT: 12.1 % (ref 12.3–15.4)
F TULAR IGG TITR SER: NEGATIVE {TITER}
F TULAR IGM TITR SER: NEGATIVE {TITER}
GRAM STN SPEC: ABNORMAL
GRAM STN SPEC: ABNORMAL
HCT VFR BLD AUTO: 32.2 % (ref 37.5–51)
HGB BLD-MCNC: 10.3 G/DL (ref 13–17.7)
ISOLATED FROM: ABNORMAL
MCH RBC QN AUTO: 28.1 PG (ref 26.6–33)
MCHC RBC AUTO-ENTMCNC: 32 G/DL (ref 31.5–35.7)
MCV RBC AUTO: 87.7 FL (ref 79–97)
PLATELET # BLD AUTO: 365 10*3/MM3 (ref 140–450)
PMV BLD AUTO: 10.5 FL (ref 6–12)
RBC # BLD AUTO: 3.67 10*6/MM3 (ref 4.14–5.8)
WBC # BLD AUTO: 7.61 10*3/MM3 (ref 3.4–10.8)

## 2021-08-18 PROCEDURE — 25010000002 CEFTRIAXONE PER 250 MG: Performed by: INTERNAL MEDICINE

## 2021-08-18 PROCEDURE — 85027 COMPLETE CBC AUTOMATED: CPT | Performed by: PSYCHIATRY & NEUROLOGY

## 2021-08-18 PROCEDURE — 99231 SBSQ HOSP IP/OBS SF/LOW 25: CPT | Performed by: FAMILY MEDICINE

## 2021-08-18 PROCEDURE — 25010000002 VANCOMYCIN 10 G RECONSTITUTED SOLUTION: Performed by: INTERNAL MEDICINE

## 2021-08-18 PROCEDURE — 25010000002 VANCOMYCIN 10 G RECONSTITUTED SOLUTION: Performed by: FAMILY MEDICINE

## 2021-08-18 RX ADMIN — ATORVASTATIN CALCIUM 40 MG: 40 TABLET, FILM COATED ORAL at 08:12

## 2021-08-18 RX ADMIN — TAMSULOSIN HYDROCHLORIDE 0.4 MG: 0.4 CAPSULE ORAL at 08:11

## 2021-08-18 RX ADMIN — CLOPIDOGREL 75 MG: 75 TABLET, FILM COATED ORAL at 08:11

## 2021-08-18 RX ADMIN — CEFTRIAXONE 2 G: 2 INJECTION, POWDER, FOR SOLUTION INTRAMUSCULAR; INTRAVENOUS at 08:11

## 2021-08-18 RX ADMIN — METOPROLOL SUCCINATE 25 MG: 25 TABLET, FILM COATED, EXTENDED RELEASE ORAL at 08:11

## 2021-08-18 RX ADMIN — LOSARTAN POTASSIUM 12.5 MG: 25 TABLET, FILM COATED ORAL at 08:11

## 2021-08-18 RX ADMIN — VANCOMYCIN HYDROCHLORIDE 1250 MG: 10 INJECTION, POWDER, LYOPHILIZED, FOR SOLUTION INTRAVENOUS at 13:24

## 2021-08-18 RX ADMIN — SODIUM CHLORIDE, PRESERVATIVE FREE 10 ML: 5 INJECTION INTRAVENOUS at 08:11

## 2021-08-18 RX ADMIN — VANCOMYCIN HYDROCHLORIDE 1250 MG: 10 INJECTION, POWDER, LYOPHILIZED, FOR SOLUTION INTRAVENOUS at 06:09

## 2021-08-18 RX ADMIN — VANCOMYCIN HYDROCHLORIDE 1250 MG: 10 INJECTION, POWDER, LYOPHILIZED, FOR SOLUTION INTRAVENOUS at 21:54

## 2021-08-18 RX ADMIN — ASPIRIN 81 MG: 81 TABLET ORAL at 08:11

## 2021-08-18 RX ADMIN — SODIUM CHLORIDE, PRESERVATIVE FREE 10 ML: 5 INJECTION INTRAVENOUS at 20:52

## 2021-08-18 RX ADMIN — CEFTRIAXONE SODIUM 2 G: 2 INJECTION, POWDER, FOR SOLUTION INTRAMUSCULAR; INTRAVENOUS at 20:53

## 2021-08-18 NOTE — PROGRESS NOTES
"CC:\"im ok with going to [place]\"--spoke to dr boo adair ct surgery at [place] who has accepted in transfer--awaiting bed    Review of Systems   Constitutional: Positive for activity change.   HENT: Negative.    Eyes: Negative.    Respiratory: Negative.    Cardiovascular: Negative.    Gastrointestinal: Negative.    Endocrine: Negative.    Genitourinary: Negative.    Musculoskeletal: Negative.    Skin: Negative.    Allergic/Immunologic: Negative.    Neurological: Negative.    Hematological: Negative.    Psychiatric/Behavioral: Negative.      Temp:  [97.7 °F (36.5 °C)-98 °F (36.7 °C)] 97.7 °F (36.5 °C)  Heart Rate:  [79-88] 86  Resp:  [16-18] 16  BP: (102-135)/(58-77) 135/70  I/O last 3 completed shifts:  In: 1620 [P.O.:1020; IV Piggyback:600]  Out: 1875 [Urine:1875]  No intake/output data recorded.    Physical Exam  Vitals reviewed.   Constitutional:       Appearance: Normal appearance.   HENT:      Head: Normocephalic and atraumatic.      Nose: Nose normal.      Mouth/Throat:      Mouth: Mucous membranes are moist.   Eyes:      Pupils: Pupils are equal, round, and reactive to light.   Cardiovascular:      Rate and Rhythm: Normal rate and regular rhythm.      Pulses: Normal pulses.      Heart sounds: Normal heart sounds.   Pulmonary:      Effort: Pulmonary effort is normal.      Breath sounds: Normal breath sounds.   Abdominal:      General: Abdomen is flat. Bowel sounds are normal.   Musculoskeletal:         General: Normal range of motion.      Cervical back: Normal range of motion.   Skin:     General: Skin is warm and dry.      Capillary Refill: Capillary refill takes less than 2 seconds.   Neurological:      General: No focal deficit present.      Mental Status: He is alert and oriented to person, place, and time. Mental status is at baseline.   Psychiatric:         Mood and Affect: Mood normal.         Behavior: Behavior normal.         Thought Content: Thought content normal.         Judgment: " Judgment normal.           Elevated troponin    Confusion    Numbness of tongue    Acute bacterial endocarditis    Cerebrovascular accident (CVA) due to embolism of cerebral artery (CMS/HCC)    Continue antbx--awaiting bed at [facility] for transfer

## 2021-08-18 NOTE — CASE MANAGEMENT/SOCIAL WORK
Continued Stay Note   Jal     Patient Name: Robert Piedra  MRN: [id number]  Today's Date: 8/18/2021    Admit Date: 8/10/2021    Discharge Plan     Row Name 08/18/21 0938       Plan    Plan Comments  [facility] called and spoke with Padma vicente [facility] [phone] option [phone] who states that there is discharges for this afternoon but unsure if bed will be available or not. SW provided floors number to call when bed is available        Discharge Codes    No documentation.             Alley Thomas

## 2021-08-18 NOTE — PLAN OF CARE
Goal Outcome Evaluation:         VSS, no c/o pain, neuros in tact, sinus 67--87 with pvc, 5 in a row, pac's, continue to monitor

## 2021-08-18 NOTE — PLAN OF CARE
PATIENT ALERT AND ORIENTED, NIH 0. ANTIBIOTICS CONTINUE. PATIENT HAS URINARY RETENTION AND IN/OUT CATHS SELF. AWAITING BED AT [place]. WIFE AT BEDSIDE. NO C/O PAIN. UP AD ARI. CONT TO MONITOR.
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
## 2021-08-18 NOTE — PROGRESS NOTES
"Infectious Diseases Progress Note    Patient:  Robert Piedra  YOB: 1965  MRN: [id number]   Admit date: 8/10/2021   Admitting Physician: Shad Curiel MD  Primary Care Physician: Shad Curiel MD    Chief Complaint/Interval History: He feels okay.  No new symptoms.  Continues to await bed at tertiary care center.  No symptoms to suggest TIA.  No symptoms to suggest CHF.    Spoke with him again about his history of penicillin allergy.  He indicates he had received penicillin years ago from a doctor in [place].  He can remember sitting in a college class.  It was about 7 days or so after he had started treatment with a penicillin derivative.  Indicates he felt like bugs were crawling on his skin.  He indicates he felt itching.  He developed some hives.  He does describe some lip swelling.  He did not have shortness of breath.  He did not have difficulty breathing.  He cannot remember what treatment he received.  He indicates he did not require hospitalization.  He seems to recall the physician saying, \"this is a classic 8-day reaction.\"  He also indicates that about 4 years ago a few days after receiving a prostate biopsy he had had some fever and difficulties.  He indicates they told him they were going to give him a medicine that was some type of penicillin derivative.  He indicates they said they had Benadryl and another injection available in case they needed it.  Indicates he received the medication without difficulty.  He is not sure what antibiotic he received at that time.    Spoke with pharmacy.  Try to see if he has received any penicillin derivatives in the past.      Intake/Output Summary (Last 24 hours) at 8/18/2021 1618  Last data filed at 8/18/2021 1324  Gross per 24 hour   Intake 1570 ml   Output 1275 ml   Net 295 ml     Allergies:   Allergies   Allergen Reactions   • Penicillins Hives     Current Scheduled Medications:   aspirin, 81 mg, Oral, Daily  atorvastatin, 40 mg, " "Oral, Daily  cefTRIAXone, 2 g, Intravenous, Q12H  clopidogrel, 75 mg, Oral, Daily  losartan, 12.5 mg, Oral, Q24H  metoprolol succinate XL, 25 mg, Oral, Q24H  sodium chloride, 10 mL, Intravenous, Q12H  tamsulosin, 0.4 mg, Oral, Daily  vancomycin, 1,250 mg, Intravenous, Q8H      Current PRN Medications:  •  acetaminophen  •  cyclobenzaprine  •  HYDROcodone-acetaminophen  •  [COMPLETED] Insert peripheral IV **AND** sodium chloride    Review of Systems see HPI    Vital Signs:  /60 (BP Location: Left arm, Patient Position: Lying)   Pulse 83   Temp 98.5 °F (36.9 °C) (Oral)   Resp 18   Ht 195.6 cm (77.01\")   Wt 96.6 kg (213 lb)   SpO2 98%   BMI 25.25 kg/m²     Physical Exam  Vital signs - reviewed.  Line/IV site - No erythema, warmth, induration, or tenderness.  Lungs clear without crackles  Heart exam without change  Extremities without significant edema  General alert, pleasant, smiling, interactive, and in no distress    Lab Results:  CBC:   Results from last 7 days   Lab Units 08/18/21  0504 08/17/21  0429 08/16/21  0223 08/15/21  0157 08/14/21  0429 08/13/21  0515 08/12/21  1111   WBC 10*3/mm3 7.61 6.22 8.18 9.08 9.41 9.69 9.08   HEMOGLOBIN g/dL 10.3* 10.4* 9.7* 10.0* 10.8* 10.3* 11.0*   HEMATOCRIT % 32.2* 31.9* 30.4* 31.3* 33.7* 31.5* 34.0*   PLATELETS 10*3/mm3 365 336 320 328 325 319 366     BMP:  Results from last 7 days   Lab Units 08/16/21  0223 08/13/21  0515 08/13/21  0515 08/12/21  1111 08/12/21  1111   SODIUM mmol/L 136  --  135*  --  134*   POTASSIUM mmol/L 4.1  --  3.9  --  4.0   CHLORIDE mmol/L 104  --  102  --  100   CO2 mmol/L 27.0  --  25.0  --  26.0   BUN mg/dL 11  --  9  --  11   CREATININE mg/dL 0.80  --  0.80  --  0.78   GLUCOSE mg/dL 101*   < > 121*   < > 121*   CALCIUM mg/dL 8.8  --  8.9  --  9.3   ALT (SGPT) U/L  --   --  30  --  20    < > = values in this interval not displayed.     Culture Results:   Blood Culture   Date Value Ref Range Status   08/15/2021 No growth at 2 days  " Preliminary   08/15/2021 No growth at 2 days  Preliminary   08/13/2021 Enterococcus faecalis (C)  Final     Comment:     Infectious disease consultation is highly recommended.   08/13/2021 Enterococcus faecalis (C)  Final     Comment:     Infectious disease consultation is highly recommended.   08/12/2021 Enterococcus faecalis (C)  Final     Comment:     Infectious disease consultation is highly recommended.   08/12/2021 Enterococcus faecalis (C)  Final     Comment:     Infectious disease consultation is highly recommended.   Susceptibility     Enterococcus faecalis     TONO     Ampicillin <=2 ug/ml Susceptible     Gentamicin High Level Synergy SYN-S ug/ml Susceptible     Vancomycin 1 ug/ml Susceptible      Radiology: None    Additional Studies Reviewed: None    Impression:   Enterococcal endocarditis  History of penicillin allergy    Recommendations:   Blood cultures appear to be clearing  He seems to be stable from infectious disease standpoint without additional findings to suggest embolic stigmata or CHF  Would like to treat with ampicillin and ceftriaxone-trying to work with pharmacy to determine whether he is received penicillin derivative in the past.  If not going to talk with him further about considering desensitization.  Continue to watch for metastatic foci of infection/embolic stigmata/CHF  Continue to follow    Elliott Castro MD

## 2021-08-18 NOTE — PROGRESS NOTES
"CC:\"im doing ok\"--no new nursing staff concerns    Review of Systems   Constitutional: Positive for activity change.   HENT: Negative.    Eyes: Negative.    Respiratory: Negative.    Cardiovascular: Negative.    Gastrointestinal: Negative.    Endocrine: Negative.    Genitourinary: Negative.    Musculoskeletal: Negative.    Skin: Negative.    Allergic/Immunologic: Negative.    Neurological: Negative.    Hematological: Negative.    Psychiatric/Behavioral: Negative.      Temp:  [97.7 °F (36.5 °C)-98 °F (36.7 °C)] 97.7 °F (36.5 °C)  Heart Rate:  [79-88] 86  Resp:  [16-18] 16  BP: (102-135)/(58-77) 135/70  I/O last 3 completed shifts:  In: 1620 [P.O.:1020; IV Piggyback:600]  Out: 1875 [Urine:1875]  No intake/output data recorded.    Physical Exam  Vitals and nursing note reviewed.   Constitutional:       Appearance: Normal appearance. He is normal weight.   HENT:      Head: Normocephalic and atraumatic.      Nose: Nose normal.      Mouth/Throat:      Mouth: Mucous membranes are moist.   Eyes:      Extraocular Movements: Extraocular movements intact.      Pupils: Pupils are equal, round, and reactive to light.   Cardiovascular:      Rate and Rhythm: Normal rate and regular rhythm.      Pulses: Normal pulses.      Heart sounds: Normal heart sounds.   Pulmonary:      Effort: Pulmonary effort is normal.      Breath sounds: Normal breath sounds.   Abdominal:      General: Abdomen is flat. Bowel sounds are normal.      Palpations: Abdomen is soft.   Musculoskeletal:         General: Normal range of motion.      Cervical back: Normal range of motion and neck supple.   Skin:     General: Skin is warm and dry.      Capillary Refill: Capillary refill takes less than 2 seconds.   Neurological:      General: No focal deficit present.      Mental Status: He is alert and oriented to person, place, and time. Mental status is at baseline.   Psychiatric:         Mood and Affect: Mood normal.         Behavior: Behavior normal.         " Thought Content: Thought content normal.         Judgment: Judgment normal.           Elevated troponin    Confusion    Numbness of tongue    Acute bacterial endocarditis    Cerebrovascular accident (CVA) due to embolism of cerebral artery (CMS/HCC)    Awaiting bed opening at [facility] for transfer

## 2021-08-19 LAB
ALBUMIN SERPL-MCNC: 3.1 G/DL (ref 3.5–5.2)
ALBUMIN/GLOB SERPL: 0.8 G/DL
ALP SERPL-CCNC: 83 U/L (ref 39–117)
ALT SERPL W P-5'-P-CCNC: 24 U/L (ref 1–41)
ANION GAP SERPL CALCULATED.3IONS-SCNC: 6 MMOL/L (ref 5–15)
AST SERPL-CCNC: 19 U/L (ref 1–40)
B HENSELAE DNA SPEC QL NAA+PROBE: NEGATIVE
BILIRUB SERPL-MCNC: 0.2 MG/DL (ref 0–1.2)
BUN SERPL-MCNC: 12 MG/DL (ref 6–20)
BUN/CREAT SERPL: 16.2 (ref 7–25)
CALCIUM SPEC-SCNC: 8.7 MG/DL (ref 8.6–10.5)
CHLORIDE SERPL-SCNC: 104 MMOL/L (ref 98–107)
CO2 SERPL-SCNC: 27 MMOL/L (ref 22–29)
CREAT SERPL-MCNC: 0.74 MG/DL (ref 0.76–1.27)
DEPRECATED RDW RBC AUTO: 39.8 FL (ref 37–54)
ERYTHROCYTE [DISTWIDTH] IN BLOOD BY AUTOMATED COUNT: 12.4 % (ref 12.3–15.4)
GFR SERPL CREATININE-BSD FRML MDRD: 109 ML/MIN/1.73
GLOBULIN UR ELPH-MCNC: 3.7 GM/DL
GLUCOSE SERPL-MCNC: 96 MG/DL (ref 65–99)
HCT VFR BLD AUTO: 30.9 % (ref 37.5–51)
HGB BLD-MCNC: 10.1 G/DL (ref 13–17.7)
MCH RBC QN AUTO: 28.6 PG (ref 26.6–33)
MCHC RBC AUTO-ENTMCNC: 32.7 G/DL (ref 31.5–35.7)
MCV RBC AUTO: 87.5 FL (ref 79–97)
PLATELET # BLD AUTO: 327 10*3/MM3 (ref 140–450)
PMV BLD AUTO: 10.5 FL (ref 6–12)
POTASSIUM SERPL-SCNC: 4.1 MMOL/L (ref 3.5–5.2)
PROT SERPL-MCNC: 6.8 G/DL (ref 6–8.5)
RBC # BLD AUTO: 3.53 10*6/MM3 (ref 4.14–5.8)
SODIUM SERPL-SCNC: 137 MMOL/L (ref 136–145)
T WHIPPLEI BY PCR, SOURCE: NORMAL
TROPHERYMA WHIPPLEI PCR: NOT DETECTED
VANCOMYCIN TROUGH SERPL-MCNC: 15.1 MCG/ML (ref 5–20)
WBC # BLD AUTO: 8.3 10*3/MM3 (ref 3.4–10.8)

## 2021-08-19 PROCEDURE — 99231 SBSQ HOSP IP/OBS SF/LOW 25: CPT | Performed by: FAMILY MEDICINE

## 2021-08-19 PROCEDURE — 85027 COMPLETE CBC AUTOMATED: CPT | Performed by: PSYCHIATRY & NEUROLOGY

## 2021-08-19 PROCEDURE — 80202 ASSAY OF VANCOMYCIN: CPT | Performed by: INTERNAL MEDICINE

## 2021-08-19 PROCEDURE — 25010000002 CEFTRIAXONE PER 250 MG: Performed by: INTERNAL MEDICINE

## 2021-08-19 PROCEDURE — 25010000002 VANCOMYCIN 10 G RECONSTITUTED SOLUTION: Performed by: INTERNAL MEDICINE

## 2021-08-19 PROCEDURE — 80053 COMPREHEN METABOLIC PANEL: CPT | Performed by: FAMILY MEDICINE

## 2021-08-19 RX ADMIN — TAMSULOSIN HYDROCHLORIDE 0.4 MG: 0.4 CAPSULE ORAL at 08:22

## 2021-08-19 RX ADMIN — SODIUM CHLORIDE, PRESERVATIVE FREE 10 ML: 5 INJECTION INTRAVENOUS at 08:22

## 2021-08-19 RX ADMIN — CEFTRIAXONE SODIUM 2 G: 2 INJECTION, POWDER, FOR SOLUTION INTRAMUSCULAR; INTRAVENOUS at 08:21

## 2021-08-19 RX ADMIN — ASPIRIN 81 MG: 81 TABLET ORAL at 08:22

## 2021-08-19 RX ADMIN — CLOPIDOGREL 75 MG: 75 TABLET, FILM COATED ORAL at 08:22

## 2021-08-19 RX ADMIN — VANCOMYCIN HYDROCHLORIDE 1250 MG: 10 INJECTION, POWDER, LYOPHILIZED, FOR SOLUTION INTRAVENOUS at 21:16

## 2021-08-19 RX ADMIN — VANCOMYCIN HYDROCHLORIDE 1250 MG: 10 INJECTION, POWDER, LYOPHILIZED, FOR SOLUTION INTRAVENOUS at 05:06

## 2021-08-19 RX ADMIN — LOSARTAN POTASSIUM 12.5 MG: 25 TABLET, FILM COATED ORAL at 08:21

## 2021-08-19 RX ADMIN — ATORVASTATIN CALCIUM 40 MG: 40 TABLET, FILM COATED ORAL at 08:21

## 2021-08-19 RX ADMIN — CEFTRIAXONE SODIUM 2 G: 2 INJECTION, POWDER, FOR SOLUTION INTRAMUSCULAR; INTRAVENOUS at 18:33

## 2021-08-19 RX ADMIN — METOPROLOL SUCCINATE 25 MG: 25 TABLET, FILM COATED, EXTENDED RELEASE ORAL at 08:22

## 2021-08-19 RX ADMIN — VANCOMYCIN HYDROCHLORIDE 1250 MG: 10 INJECTION, POWDER, LYOPHILIZED, FOR SOLUTION INTRAVENOUS at 13:13

## 2021-08-19 RX ADMIN — SODIUM CHLORIDE, PRESERVATIVE FREE 10 ML: 5 INJECTION INTRAVENOUS at 21:16

## 2021-08-19 NOTE — PROGRESS NOTES
Bedside rounding visit to see Patient made with  per his request. Extensive discussion between  & Patient regarding treatment options and Patient's reported history of penicillin allergy (occurred many years ago when Patient was in college).  discussed option of using ampicillin therapy with Patient & described desensitization protocol / process. Patient had opportunity to ask questions and is agreeable to proceed with ampicillin desensitization if deemed clinically appropriate. I encouraged him to inquire if he has further questions.     Juan Schwab, Pharm D  8/19/2021  09:28 CDT

## 2021-08-19 NOTE — PROGRESS NOTES
Infectious Diseases Progress Note    Patient:  Robert Piedra  YOB: 1965  MRN: [id number]   Admit date: 8/10/2021   Admitting Physician: Shad Curiel MD  Primary Care Physician: Shad Curiel MD    Chief Complaint/Interval History:   He feels fine  He is without fever or chills  No new symptoms to suggest TIA  No chest pain, cough, shortness of breath or other cardiopulmonary symptoms at present  Talked with him at length with Juan the pharmacist present with me.  Reviewed with him discussion we had yesterday regarding his possible penicillin allergy.  Patient indicated today a recollection he had not shared previously.  He thinks a while after he had had the possible penicillin reaction described in yesterday's note, that he had been prescribed another course of ampicillin or amoxicillin.  Indicates with that course he seems to remember having had some hives and lip swelling without difficulty breathing.  He does seem to remember going to the ER.  He is not sure if it was penicillin or ampicillin that he received with this second reaction.  Thinks it might have been.  Talked with him at length today with the pharmacist present.  Explained treatment of choice for enterococcal endocarditis would be ampicillin plus ceftriaxone.  Reviewed consideration of an oral challenge.  Reviewed the process of desensitization.  Based on his history I would lean toward providing him desensitization if we/he elects to go with ampicillin treatment.  He is willing to try ampicillin.  He understands the risks of desensitization.  He would like to make sure he has  nursing present throughout the desensitization to offer some security observation.  I also shared with him I would not want to proceed with desensitization it was going to interrupt issues with possible tertiary care.  We did like him to be discitis prior to or after transfer but not during.  He agrees.    Intake/Output Summary (Last 24 hours)  "at 8/19/2021 0743  Last data filed at 8/19/2021 0506  Gross per 24 hour   Intake 970 ml   Output 850 ml   Net 120 ml     Allergies:   Allergies   Allergen Reactions   • Penicillins Hives     Current Scheduled Medications:   aspirin, 81 mg, Oral, Daily  atorvastatin, 40 mg, Oral, Daily  cefTRIAXone, 2 g, Intravenous, Q12H  clopidogrel, 75 mg, Oral, Daily  losartan, 12.5 mg, Oral, Q24H  metoprolol succinate XL, 25 mg, Oral, Q24H  sodium chloride, 10 mL, Intravenous, Q12H  tamsulosin, 0.4 mg, Oral, Daily  vancomycin, 1,250 mg, Intravenous, Q8H      Current PRN Medications:  •  acetaminophen  •  cyclobenzaprine  •  HYDROcodone-acetaminophen  •  [COMPLETED] Insert peripheral IV **AND** sodium chloride    Review of Systems no diarrhea, rash, or skin itching.    Vital Signs:  /65 (BP Location: Left arm, Patient Position: Sitting)   Pulse 82   Temp 98.2 °F (36.8 °C) (Oral)   Resp 16   Ht 195.6 cm (77.01\")   Wt 95.7 kg (211 lb)   SpO2 98%   BMI 25.02 kg/m²     Physical Exam  Vital signs - reviewed.  Line/IV (PICC) site - No erythema, warmth, induration, or tenderness.  Cardiac exam stable  Lungs without crackles  Extremities without significant edema  No splinter hemorrhages or Janeway lesions  No conjunctival hemorrhages    Lab Results:  CBC:   Results from last 7 days   Lab Units 08/19/21  0418 08/18/21  0504 08/17/21  0429 08/16/21  0223 08/15/21  0157 08/14/21  0429 08/13/21  0515   WBC 10*3/mm3 8.30 7.61 6.22 8.18 9.08 9.41 9.69   HEMOGLOBIN g/dL 10.1* 10.3* 10.4* 9.7* 10.0* 10.8* 10.3*   HEMATOCRIT % 30.9* 32.2* 31.9* 30.4* 31.3* 33.7* 31.5*   PLATELETS 10*3/mm3 327 365 336 320 328 325 319     BMP:  Results from last 7 days   Lab Units 08/16/21  0223 08/13/21  0515 08/13/21  0515 08/12/21  1111 08/12/21  1111   SODIUM mmol/L 136  --  135*  --  134*   POTASSIUM mmol/L 4.1  --  3.9  --  4.0   CHLORIDE mmol/L 104  --  102  --  100   CO2 mmol/L 27.0  --  25.0  --  26.0   BUN mg/dL 11  --  9  --  11 "   CREATININE mg/dL 0.80  --  0.80  --  0.78   GLUCOSE mg/dL 101*   < > 121*   < > 121*   CALCIUM mg/dL 8.8  --  8.9  --  9.3   ALT (SGPT) U/L  --   --  30  --  20    < > = values in this interval not displayed.     Culture Results:   Blood Culture   Date Value Ref Range Status   08/15/2021 No growth at 3 days  Preliminary   08/15/2021 No growth at 3 days  Preliminary   08/13/2021 Enterococcus faecalis (C)  Final     Comment:     Infectious disease consultation is highly recommended.   08/13/2021 Enterococcus faecalis (C)  Final     Comment:     Infectious disease consultation is highly recommended.   08/12/2021 Enterococcus faecalis (C)  Final     Comment:     Infectious disease consultation is highly recommended.   08/12/2021 Enterococcus faecalis (C)  Final     Comment:     Infectious disease consultation is highly recommended.     Radiology: None  Additional Studies Reviewed: None    Impression:   Enterococcal endocarditis  History of pathology    Recommendations:   Continue vancomycin and ceftriaxone at present  Going to see where things stand regarding possible transfer  Working with pharmacy for possible desensitization protocol for ampicillin-patient willing to proceed with desensitization  Will discuss with nursing best way to help staff monitoring him during desensitization (on the floor with a dedicated nurse for the desensitization or possible unit transfer for the desensitization)   Continue to follow    Elliott Castro MD

## 2021-08-19 NOTE — PLAN OF CARE
Goal Outcome Evaluation:               VSS. No C/O pain. AAOX4. Sinus 72-99 with PVC on tele. Continues on room air with O2 saturation in the 90's. Continues on anticoagulant. Right upper arm PICC site CDI. Continues on IV antibiotic. NIH scale remains 0. Neuro checks every 4 hours performed. Anticipating discharge to [facility]. Medication education provided. Safety maintained.

## 2021-08-19 NOTE — CASE MANAGEMENT/SOCIAL WORK
Continued Stay Note   [place]     Patient Name: Robert Piedra  MRN: [id number]  Today's Date: 8/19/2021    Admit Date: 8/10/2021    Discharge Plan     Row Name 08/19/21 1011       Plan    Plan  [facility]    Patient/Family in Agreement with Plan  yes    Plan Comments  SW spoke to Nohemi at [facility] transfer [phone].  They are still awaiting a bed opening and will call once ready.        Discharge Codes    No documentation.             SEE Horton

## 2021-08-19 NOTE — PAYOR COMM NOTE
"Teressa Piedra (56 y.o. Male) [id number]   Cont stay   Pt remains in hospital  Please review clinical      [facility] phone    Fax        Date of Birth Social Security Number Address Home Phone MRN    1965  [address] [place], [place] [address] [id number] [phone] [id number]    Yazdanism Marital Status          Mandaeism        Admission Date Admission Type Admitting Provider Attending Provider Department, Room/Bed    8/10/21 Emergency Shad Curiel MD [place], Thomas Waldon, MD [facility] 4B, 448/1    Discharge Date Discharge Disposition Discharge Destination                       Attending Provider: Shad Curiel MD    Allergies: Penicillins    Isolation: None   Infection: None   Code Status: CPR    Ht: 195.6 cm (77.01\")   Wt: 95.7 kg (211 lb)    Admission Cmt: None   Principal Problem: None                Active Insurance as of 8/10/2021     Primary Coverage     Payor Plan Insurance Group Employer/Plan Group    [employer] [facility] [employer] [facility] PPO [id number]     Payor Plan Address Payor Plan Phone Number Payor Plan Fax Number Effective Dates    [address] [phone]  12/24/2012 - None Entered    [place] [address]       Subscriber Name Subscriber Birth Date Member ID       TERESSA PIEDRA 1965 [id number]                 Emergency Contacts      (Rel.) Home Phone Work Phone Mobile Phone    Amparo Piedra (Spouse) [phone] -- [phone]              Current Facility-Administered Medications   Medication Dose Route Frequency Provider Last Rate Last Admin   • acetaminophen (TYLENOL) tablet 650 mg  650 mg Oral Q6H PRN Shad Curiel MD   650 mg at 08/13/21 1449   • aspirin EC tablet 81 mg  81 mg Oral Daily Knees, Malgorzata E, APRN   81 mg at 08/19/21 0822   • atorvastatin (LIPITOR) tablet 40 mg  40 mg Oral Daily Knees, Malgorzata E, APRN   40 mg at 08/19/21 0821   • cefTRIAXone (ROCEPHIN) 2 g in " sodium chloride 0.9 % 100 mL IVPB-VTB  2 g Intravenous Q12H Elliott Rock MD   Stopped at 08/19/21 0930   • clopidogrel (PLAVIX) tablet 75 mg  75 mg Oral Daily Shad Curiel MD   75 mg at 08/19/21 0822   • cyclobenzaprine (FLEXERIL) tablet 5 mg  5 mg Oral TID PRN Shad Curiel MD       • HYDROcodone-acetaminophen (NORCO) 5-325 MG per tablet 1 tablet  1 tablet Oral Q6H PRN Shad Curiel MD       • losartan (COZAAR) tablet 12.5 mg  12.5 mg Oral Q24H Knees, Malgorzata E, APRN   12.5 mg at 08/19/21 0821   • metoprolol succinate XL (TOPROL-XL) 24 hr tablet 25 mg  25 mg Oral Q24H Knees, Malgorzata E, APRN   25 mg at 08/19/21 0822   • sodium chloride 0.9 % flush 10 mL  10 mL Intravenous PRN Shad Curiel MD       • sodium chloride 0.9 % flush 10 mL  10 mL Intravenous Q12H Shad Curiel MD   10 mL at 08/19/21 0822   • tamsulosin (FLOMAX) 24 hr capsule 0.4 mg  0.4 mg Oral Daily Shad Curiel MD   0.4 mg at 08/19/21 0822   • vancomycin 1250 mg/250 mL 0.9% NS IVPB ([facility])  1,250 mg Intravenous Q8H Elliott Rock MD 0 mL/hr at 08/17/21 1500 1,250 mg at 08/19/21 1313        Physician Progress Notes (last 24 hours) (Notes from 08/18/21 1416 through 08/19/21 1416)      Elliott Rock MD at 08/19/21 0743          Infectious Diseases Progress Note    Patient:  Robert Piedra  YOB: 1965  MRN: [id number]   Admit date: 8/10/2021   Admitting Physician: Shad Curiel MD  Primary Care Physician: Shad Curiel MD    Chief Complaint/Interval History:   He feels fine  He is without fever or chills  No new symptoms to suggest TIA  No chest pain, cough, shortness of breath or other cardiopulmonary symptoms at present  Talked with him at length with Juan the pharmacist present with me.  Reviewed with him discussion we had yesterday regarding his possible penicillin allergy.  Patient indicated today a recollection he had not shared previously.  He thinks a while after he  had had the possible penicillin reaction described in yesterday's note, that he had been prescribed another course of ampicillin or amoxicillin.  Indicates with that course he seems to remember having had some hives and lip swelling without difficulty breathing.  He does seem to remember going to the ER.  He is not sure if it was penicillin or ampicillin that he received with this second reaction.  Thinks it might have been.  Talked with him at length today with the pharmacist present.  Explained treatment of choice for enterococcal endocarditis would be ampicillin plus ceftriaxone.  Reviewed consideration of an oral challenge.  Reviewed the process of desensitization.  Based on his history I would lean toward providing him desensitization if we/he elects to go with ampicillin treatment.  He is willing to try ampicillin.  He understands the risks of desensitization.  He would like to make sure he has  nursing present throughout the desensitization to offer some security observation.  I also shared with him I would not want to proceed with desensitization it was going to interrupt issues with possible tertiary care.  We did like him to be discitis prior to or after transfer but not during.  He agrees.    Intake/Output Summary (Last 24 hours) at 8/19/2021 0743  Last data filed at 8/19/2021 0506  Gross per 24 hour   Intake 970 ml   Output 850 ml   Net 120 ml     Allergies:   Allergies   Allergen Reactions   • Penicillins Hives     Current Scheduled Medications:   aspirin, 81 mg, Oral, Daily  atorvastatin, 40 mg, Oral, Daily  cefTRIAXone, 2 g, Intravenous, Q12H  clopidogrel, 75 mg, Oral, Daily  losartan, 12.5 mg, Oral, Q24H  metoprolol succinate XL, 25 mg, Oral, Q24H  sodium chloride, 10 mL, Intravenous, Q12H  tamsulosin, 0.4 mg, Oral, Daily  vancomycin, 1,250 mg, Intravenous, Q8H      Current PRN Medications:  •  acetaminophen  •  cyclobenzaprine  •  HYDROcodone-acetaminophen  •  [COMPLETED] Insert peripheral IV  "**AND** sodium chloride    Review of Systems no diarrhea, rash, or skin itching.    Vital Signs:  /65 (BP Location: Left arm, Patient Position: Sitting)   Pulse 82   Temp 98.2 °F (36.8 °C) (Oral)   Resp 16   Ht 195.6 cm (77.01\")   Wt 95.7 kg (211 lb)   SpO2 98%   BMI 25.02 kg/m²     Physical Exam  Vital signs - reviewed.  Line/IV (PICC) site - No erythema, warmth, induration, or tenderness.  Cardiac exam stable  Lungs without crackles  Extremities without significant edema  No splinter hemorrhages or Janeway lesions  No conjunctival hemorrhages    Lab Results:  CBC:   Results from last 7 days   Lab Units 08/19/21  0418 08/18/21  0504 08/17/21  0429 08/16/21  0223 08/15/21  0157 08/14/21  0429 08/13/21  0515   WBC 10*3/mm3 8.30 7.61 6.22 8.18 9.08 9.41 9.69   HEMOGLOBIN g/dL 10.1* 10.3* 10.4* 9.7* 10.0* 10.8* 10.3*   HEMATOCRIT % 30.9* 32.2* 31.9* 30.4* 31.3* 33.7* 31.5*   PLATELETS 10*3/mm3 327 365 336 320 328 325 319     BMP:  Results from last 7 days   Lab Units 08/16/21  0223 08/13/21  0515 08/13/21  0515 08/12/21  1111 08/12/21  1111   SODIUM mmol/L 136  --  135*  --  134*   POTASSIUM mmol/L 4.1  --  3.9  --  4.0   CHLORIDE mmol/L 104  --  102  --  100   CO2 mmol/L 27.0  --  25.0  --  26.0   BUN mg/dL 11  --  9  --  11   CREATININE mg/dL 0.80  --  0.80  --  0.78   GLUCOSE mg/dL 101*   < > 121*   < > 121*   CALCIUM mg/dL 8.8  --  8.9  --  9.3   ALT (SGPT) U/L  --   --  30  --  20    < > = values in this interval not displayed.     Culture Results:   Blood Culture   Date Value Ref Range Status   08/15/2021 No growth at 3 days  Preliminary   08/15/2021 No growth at 3 days  Preliminary   08/13/2021 Enterococcus faecalis (C)  Final     Comment:     Infectious disease consultation is highly recommended.   08/13/2021 Enterococcus faecalis (C)  Final     Comment:     Infectious disease consultation is highly recommended.   08/12/2021 Enterococcus faecalis (C)  Final     Comment:     Infectious disease " "consultation is highly recommended.   08/12/2021 Enterococcus faecalis (C)  Final     Comment:     Infectious disease consultation is highly recommended.     Radiology: None  Additional Studies Reviewed: None    Impression:   Enterococcal endocarditis  History of pathology    Recommendations:   Continue vancomycin and ceftriaxone at present  Going to see where things stand regarding possible transfer  Working with pharmacy for possible desensitization protocol for ampicillin-patient willing to proceed with desensitization  Will discuss with nursing best way to help staff monitoring him during desensitization (on the floor with a dedicated nurse for the desensitization or possible unit transfer for the desensitization)   Continue to follow    Elliott Castro MD    Electronically signed by Elliott Castro MD at 08/19/21 0305     Elliott Castro MD at 08/18/21 1618          Infectious Diseases Progress Note    Patient:  Robert Piedra  YOB: 1965  MRN: [id number]   Admit date: 8/10/2021   Admitting Physician: Shad Curiel MD  Primary Care Physician: Shad Curiel MD    Chief Complaint/Interval History: He feels okay.  No new symptoms.  Continues to await bed at tertiary care center.  No symptoms to suggest TIA.  No symptoms to suggest CHF.    Spoke with him again about his history of penicillin allergy.  He indicates he had received penicillin years ago from a doctor in [place].  He can remember sitting in a college class.  It was about 7 days or so after he had started treatment with a penicillin derivative.  Indicates he felt like bugs were crawling on his skin.  He indicates he felt itching.  He developed some hives.  He does describe some lip swelling.  He did not have shortness of breath.  He did not have difficulty breathing.  He cannot remember what treatment he received.  He indicates he did not require hospitalization.  He seems to recall the physician saying, \"this is a classic " "8-day reaction.\"  He also indicates that about 4 years ago a few days after receiving a prostate biopsy he had had some fever and difficulties.  He indicates they told him they were going to give him a medicine that was some type of penicillin derivative.  He indicates they said they had Benadryl and another injection available in case they needed it.  Indicates he received the medication without difficulty.  He is not sure what antibiotic he received at that time.    Spoke with pharmacy.  Try to see if he has received any penicillin derivatives in the past.      Intake/Output Summary (Last 24 hours) at 8/18/2021 1618  Last data filed at 8/18/2021 1324  Gross per 24 hour   Intake 1570 ml   Output 1275 ml   Net 295 ml     Allergies:   Allergies   Allergen Reactions   • Penicillins Hives     Current Scheduled Medications:   aspirin, 81 mg, Oral, Daily  atorvastatin, 40 mg, Oral, Daily  cefTRIAXone, 2 g, Intravenous, Q12H  clopidogrel, 75 mg, Oral, Daily  losartan, 12.5 mg, Oral, Q24H  metoprolol succinate XL, 25 mg, Oral, Q24H  sodium chloride, 10 mL, Intravenous, Q12H  tamsulosin, 0.4 mg, Oral, Daily  vancomycin, 1,250 mg, Intravenous, Q8H      Current PRN Medications:  •  acetaminophen  •  cyclobenzaprine  •  HYDROcodone-acetaminophen  •  [COMPLETED] Insert peripheral IV **AND** sodium chloride    Review of Systems see HPI    Vital Signs:  /60 (BP Location: Left arm, Patient Position: Lying)   Pulse 83   Temp 98.5 °F (36.9 °C) (Oral)   Resp 18   Ht 195.6 cm (77.01\")   Wt 96.6 kg (213 lb)   SpO2 98%   BMI 25.25 kg/m²     Physical Exam  Vital signs - reviewed.  Line/IV site - No erythema, warmth, induration, or tenderness.  Lungs clear without crackles  Heart exam without change  Extremities without significant edema  General alert, pleasant, smiling, interactive, and in no distress    Lab Results:  CBC:   Results from last 7 days   Lab Units 08/18/21  0504 08/17/21  0429 08/16/21  0223 08/15/21  0157 " 08/14/21  0429 08/13/21  0515 08/12/21  1111   WBC 10*3/mm3 7.61 6.22 8.18 9.08 9.41 9.69 9.08   HEMOGLOBIN g/dL 10.3* 10.4* 9.7* 10.0* 10.8* 10.3* 11.0*   HEMATOCRIT % 32.2* 31.9* 30.4* 31.3* 33.7* 31.5* 34.0*   PLATELETS 10*3/mm3 365 336 320 328 325 319 366     BMP:  Results from last 7 days   Lab Units 08/16/21  0223 08/13/21  0515 08/13/21  0515 08/12/21  1111 08/12/21  1111   SODIUM mmol/L 136  --  135*  --  134*   POTASSIUM mmol/L 4.1  --  3.9  --  4.0   CHLORIDE mmol/L 104  --  102  --  100   CO2 mmol/L 27.0  --  25.0  --  26.0   BUN mg/dL 11  --  9  --  11   CREATININE mg/dL 0.80  --  0.80  --  0.78   GLUCOSE mg/dL 101*   < > 121*   < > 121*   CALCIUM mg/dL 8.8  --  8.9  --  9.3   ALT (SGPT) U/L  --   --  30  --  20    < > = values in this interval not displayed.     Culture Results:   Blood Culture   Date Value Ref Range Status   08/15/2021 No growth at 2 days  Preliminary   08/15/2021 No growth at 2 days  Preliminary   08/13/2021 Enterococcus faecalis (C)  Final     Comment:     Infectious disease consultation is highly recommended.   08/13/2021 Enterococcus faecalis (C)  Final     Comment:     Infectious disease consultation is highly recommended.   08/12/2021 Enterococcus faecalis (C)  Final     Comment:     Infectious disease consultation is highly recommended.   08/12/2021 Enterococcus faecalis (C)  Final     Comment:     Infectious disease consultation is highly recommended.   Susceptibility     Enterococcus faecalis     TONO     Ampicillin <=2 ug/ml Susceptible     Gentamicin High Level Synergy SYN-S ug/ml Susceptible     Vancomycin 1 ug/ml Susceptible      Radiology: None    Additional Studies Reviewed: None    Impression:   Enterococcal endocarditis  History of penicillin allergy    Recommendations:   Blood cultures appear to be clearing  He seems to be stable from infectious disease standpoint without additional findings to suggest embolic stigmata or CHF  Would like to treat with ampicillin and  ceftriaxone-trying to work with pharmacy to determine whether he is received penicillin derivative in the past.  If not going to talk with him further about considering desensitization.  Continue to watch for metastatic foci of infection/embolic stigmata/CHF  Continue to follow    Elliott Castro MD    Electronically signed by Elliott Castro MD at 08/19/21 0723       AAOX4. Sinus 72-99 with PVC on tele. Continues on room air with O2 saturation in the 90's. Continues on anticoagulant. Right upper arm PICC site CDI. Continues on IV antibiotic. NIH scale remains 0. Neuro checks every 4 hours performed

## 2021-08-19 NOTE — PLAN OF CARE
Problem: Adult Inpatient Plan of Care  Goal: Patient-Specific Goal (Individualized)  8/19/2021 1608 by Sadie York RN  Outcome: Ongoing, Progressing  8/19/2021 1608 by Sadie York RN  Outcome: Ongoing, Progressing  Goal: Absence of Hospital-Acquired Illness or Injury  8/19/2021 1608 by Sadie York RN  Outcome: Ongoing, Progressing  8/19/2021 1608 by Sadie York RN  Outcome: Ongoing, Progressing  Intervention: Identify and Manage Fall Risk  Recent Flowsheet Documentation  Taken 8/19/2021 1600 by Sadie York RN  Safety Promotion/Fall Prevention: safety round/check completed  Taken 8/19/2021 1500 by Sadie York RN  Safety Promotion/Fall Prevention: safety round/check completed  Taken 8/19/2021 1400 by Sadie York RN  Safety Promotion/Fall Prevention: safety round/check completed  Taken 8/19/2021 1300 by Sadie York RN  Safety Promotion/Fall Prevention: safety round/check completed  Taken 8/19/2021 1200 by Sadie York RN  Safety Promotion/Fall Prevention: safety round/check completed  Taken 8/19/2021 1100 by Sadie York RN  Safety Promotion/Fall Prevention: safety round/check completed  Taken 8/19/2021 1000 by Sadie York RN  Safety Promotion/Fall Prevention: safety round/check completed  Taken 8/19/2021 0900 by Sadie York RN  Safety Promotion/Fall Prevention: safety round/check completed  Taken 8/19/2021 0827 by Sadie York RN  Safety Promotion/Fall Prevention: safety round/check completed  Intervention: Prevent Skin Injury  Recent Flowsheet Documentation  Taken 8/19/2021 0827 by Sadie York RN  Body Position: sitting up in bed  Intervention: Prevent and Manage VTE (venous thromboembolism) Risk  Recent Flowsheet Documentation  Taken 8/19/2021 0827 by Sadie York RN  VTE Prevention/Management: (see mar) other (see comments)  Goal: Optimal Comfort and Wellbeing  8/19/2021 1608 by Sadie York RN  Outcome:  Ongoing, Progressing  8/19/2021 1608 by Sadie York RN  Outcome: Ongoing, Progressing  Goal: Readiness for Transition of Care  8/19/2021 1608 by Sadie York RN  Outcome: Ongoing, Progressing  8/19/2021 1608 by Sadie York RN  Outcome: Ongoing, Progressing     Problem: Adjustment to Illness (Acute Coronary Syndrome)  Goal: Optimal Adaptation to Illness  8/19/2021 1608 by Sadie York RN  Outcome: Ongoing, Progressing  8/19/2021 1608 by Sadie York RN  Outcome: Ongoing, Progressing  Intervention: Support Adjustment to Life-Changing Event  Recent Flowsheet Documentation  Taken 8/19/2021 0827 by Sadie York RN  Supportive Measures: active listening utilized     Problem: Arrhythmia/Dysrhythmia (Acute Coronary Syndrome)  Goal: Normalized Cardiac Rhythm  8/19/2021 1608 by Sadie York RN  Outcome: Ongoing, Progressing  8/19/2021 1608 by Sadie York RN  Outcome: Ongoing, Progressing  Intervention: Monitor and Manage Cardiac Rhythm Effects  Recent Flowsheet Documentation  Taken 8/19/2021 0827 by Sadie York RN  VTE Prevention/Management: (see mar) other (see comments)     Problem: Cardiac-Related Pain (Acute Coronary Syndrome)  Goal: Absence of Cardiac-Related Pain  8/19/2021 1608 by Sadie York RN  Outcome: Ongoing, Progressing  8/19/2021 1608 by Sadie York RN  Outcome: Ongoing, Progressing     Problem: Hemodynamic Instability (Acute Coronary Syndrome)  Goal: Effective Cardiac Pump Function  8/19/2021 1608 by Sadie York RN  Outcome: Ongoing, Progressing  8/19/2021 1608 by Sadie York RN  Outcome: Ongoing, Progressing     Problem: Tissue Perfusion (Acute Coronary Syndrome)  Goal: Adequate Tissue Perfusion  8/19/2021 1608 by Sadie York RN  Outcome: Ongoing, Progressing  8/19/2021 1608 by Sadie York RN  Outcome: Ongoing, Progressing  Intervention: Optimize Cardiac Tissue Perfusion  Recent Flowsheet Documentation  Taken  8/19/2021 0827 by Sadie York RN  Activity Management: activity adjusted per tolerance     Problem: Adjustment to Illness (Stroke, Ischemic/Transient Ischemic Attack)  Goal: Optimal Coping  8/19/2021 1608 by Sadie York RN  Outcome: Ongoing, Progressing  8/19/2021 1608 by Sadie York RN  Outcome: Ongoing, Progressing  Intervention: Support Patient/Family Psychosocial Response to Stroke  Recent Flowsheet Documentation  Taken 8/19/2021 0827 by Sadie York RN  Supportive Measures: active listening utilized     Problem: Bowel Elimination Impaired (Stroke, Ischemic/Transient Ischemic Attack)  Goal: Effective Bowel Elimination  8/19/2021 1608 by Sadie York RN  Outcome: Ongoing, Progressing  8/19/2021 1608 by Sadie York RN  Outcome: Ongoing, Progressing     Problem: Cerebral Tissue Perfusion Risk (Stroke, Ischemic/Transient Ischemic Attack)  Goal: Optimal Cerebral Tissue Perfusion  8/19/2021 1608 by Sadie York RN  Outcome: Ongoing, Progressing  8/19/2021 1608 by Sadie York RN  Outcome: Ongoing, Progressing     Problem: Communication Impairment (Stroke, Ischemic/Transient Ischemic Attack)  Goal: Improved Communication Skills  8/19/2021 1608 by Sadie York RN  Outcome: Ongoing, Progressing  8/19/2021 1608 by Sadie York RN  Outcome: Ongoing, Progressing     Problem: Eating/Swallowing Impairment (Stroke, Ischemic/Transient Ischemic Attack)  Goal: Oral Intake without Aspiration  8/19/2021 1608 by Sadie York RN  Outcome: Ongoing, Progressing  8/19/2021 1608 by Sadie York RN  Outcome: Ongoing, Progressing     Problem: Functional Ability Impaired (Stroke, Ischemic/Transient Ischemic Attack)  Goal: Optimal Functional Ability  8/19/2021 1608 by Sadie York RN  Outcome: Ongoing, Progressing  8/19/2021 1608 by Sadie York RN  Outcome: Ongoing, Progressing  Intervention: Optimize Functional Ability  Recent Flowsheet  Documentation  Taken 8/19/2021 0827 by Sadie York RN  Activity Management: activity adjusted per tolerance     Problem: Hemodynamic Instability (Stroke, Ischemic/Transient Ischemic Attack)  Goal: Vital Signs Remain in Desired Range  8/19/2021 1608 by Sadie York RN  Outcome: Ongoing, Progressing  8/19/2021 1608 by Sadie York RN  Outcome: Ongoing, Progressing     Problem: Pain (Stroke, Ischemic/Transient Ischemic Attack)  Goal: Acceptable Pain Control  8/19/2021 1608 by Sadie York RN  Outcome: Ongoing, Progressing  8/19/2021 1608 by Sadie York RN  Outcome: Ongoing, Progressing     Problem: Sensorimotor Impairment (Stroke, Ischemic/Transient Ischemic Attack)  Goal: Improved Sensorimotor Function  8/19/2021 1608 by Sadie York RN  Outcome: Ongoing, Progressing  8/19/2021 1608 by Sadie York RN  Outcome: Ongoing, Progressing     Problem: Urinary Elimination Impaired (Stroke, Ischemic/Transient Ischemic Attack)  Goal: Effective Urinary Elimination  8/19/2021 1608 by Sadie York RN  Outcome: Ongoing, Progressing  8/19/2021 1608 by Sadie York RN  Outcome: Ongoing, Progressing   Goal Outcome Evaluation:  Plan of Care Reviewed With: patient        Progress: no change  Outcome Summary: Patient admitted from er after pt had episode of confusion at home. No c/o pain but trops are elevated. Neuro consult and cardio consult. NPO after midnight in case of testing.  Cont to monitor

## 2021-08-19 NOTE — PROGRESS NOTES
"Pharmacy Dosing Service  Pharmacokinetics  Vancomycin Follow-up Evaluation    Assessment/Action/Plan:  Vancomycin trough = 15.10 at 12:12 today (previous dose administered at 05:06 this AM).     AUC Model Data for current regimen:  Regimen: 1250 mg IV every 8 hours.  Exposure target: AUC24 (range)400-600 mg/L.hr   AUC24,ss: 482 mg/L.hr  PAUC*: 82 %  Ctrough,ss: 13.4 mg/L  Pconc*: 7 %  Tox.: 9 %    SCr=0.74 (stable). No dose adjustments at this time. Pharmacy will continue to follow daily and adjust dose accordingly.     Subjective:  Robert Piedra is a 56 y.o. male currently on Vancomycin 1250 mg IV every 8 hours for the treatment of endocarditis. Current end date: 9-18-21    Objective:  Ht: 195.6 cm (77.01\"); Wt: 95.7 kg (211 lb)  Estimated Creatinine Clearance: 149.1 mL/min (A) (by C-G formula based on SCr of 0.74 mg/dL (L)).     Creatinine   Date Value Ref Range Status   08/19/2021 0.74 (L) 0.76 - 1.27 mg/dL Final   08/16/2021 0.80 0.76 - 1.27 mg/dL Final   08/13/2021 0.80 0.76 - 1.27 mg/dL Final   08/02/2021 1 0.5 - 1.2 mg/dL Final   06/01/2021 0.9 0.5 - 1.2 mg/dL Final   05/27/2021 0.9 0.5 - 1.2 mg/dL Final      Lab Results   Component Value Date    WBC 8.30 08/19/2021    WBC 7.61 08/18/2021    WBC 6.22 08/17/2021         Lab Results   Component Value Date    VANCOTROUGH 15.10 08/19/2021         Culture Results:  Microbiology Results (last 10 days)       Procedure Component Value - Date/Time    Blood Culture With DEBBIE - Blood, Arm, Left [[id number]] Collected: 08/15/21 2042    Lab Status: Preliminary result Specimen: Blood from Arm, Left Updated: 08/18/21 2100     Blood Culture No growth at 3 days    Blood Culture With DEBBIE - Blood, Arm, Left [[id number]] Collected: 08/15/21 2040    Lab Status: Preliminary result Specimen: Blood from Arm, Left Updated: 08/18/21 2100     Blood Culture No growth at 3 days    Blood Culture With DEBBIE - Blood, Arm, Right [[id number]]  (Abnormal) Collected: 08/13/21 0813    Lab Status: Final " result Specimen: Blood from Arm, Right Updated: 08/16/21 1255     Blood Culture Enterococcus faecalis     Comment: Infectious disease consultation is highly recommended.        Isolated from Aerobic Bottle     Gram Stain Aerobic Bottle Gram positive cocci in chains      Anaerobic Bottle Gram positive cocci    Narrative:      Refer to previous blood culture collected on 8/12 for TONO's      Blood Culture With DEBBIE - Blood, Arm, Left [[id number]]  (Abnormal) Collected: 08/13/21 0813    Lab Status: Final result Specimen: Blood from Arm, Left Updated: 08/16/21 1257     Blood Culture Enterococcus faecalis     Comment: Infectious disease consultation is highly recommended.        Isolated from Anaerobic Bottle     Gram Stain Aerobic Bottle Gram positive cocci in chains      Anaerobic Bottle Gram positive cocci    Narrative:      Refer to previous blood culture collected on 8/12 for TONO's      Blood Culture - Blood, Arm, Right [[id number]]  (Abnormal) Collected: 08/12/21 1111    Lab Status: Edited Result - FINAL Specimen: Blood from Arm, Right Updated: 08/18/21 1218     Blood Culture Enterococcus faecalis     Comment: Infectious disease consultation is highly recommended.        Isolated from Aerobic Bottle     Gram Stain Aerobic Bottle Gram positive cocci in chains      Anaerobic Bottle Gram positive cocci     Comment: Appended report. These results have been appended to a previously final verified report.       Narrative:      Refer to previous blood culture collected on 8/12 for TONO's      Blood Culture - Blood, Arm, Left [[id number]]  (Abnormal)  (Susceptibility) Collected: 08/12/21 1111    Lab Status: Final result Specimen: Blood from Arm, Left Updated: 08/16/21 1253     Blood Culture Enterococcus faecalis     Comment: Infectious disease consultation is highly recommended.        Isolated from Aerobic Bottle     Gram Stain Aerobic Bottle Gram positive cocci in chains    Susceptibility        Enterococcus faecalis      TONO       Ampicillin Susceptible      Gentamicin High Level Synergy Susceptible      Vancomycin Susceptible                 Linear View                       Blood Culture ID, PCR - Blood, Arm, Left [[id number]]  (Abnormal) Collected: 08/12/21 1111    Lab Status: Final result Specimen: Blood from Arm, Left Updated: 08/13/21 1454     BCID, PCR Enterococcus spp. Identification by BCID PCR.     BOTTLE TYPE Aerobic Bottle    COVID-19,[employer] IN-HOUSE,Nasal Swab No Transport Media 3-4 HR TAT - Swab, Nasal Cavity [[id number]]  (Normal) Collected: 08/10/21 1009    Lab Status: Final result Specimen: Swab from Nasal Cavity Updated: 08/10/21 1109     COVID19 Not Detected    Narrative:      Fact sheet for providers: https://www.fda.gov/media/538641/download     Fact sheet for patients: https://www.fda.gov/media/590316/download    Test performed by PCR.    Consider negative results in combination with clinical observations, patient history, and epidemiological information.  Fact sheet for providers: https://www.fda.gov/media/729589/download     Fact sheet for patients: https://www.fda.gov/media/895367/download    Test performed by PCR.    Consider negative results in combination with clinical observations, patient history, and epidemiological information.            Juan Schwab, PharmD   08/19/21 13:04 CDT

## 2021-08-20 VITALS
HEIGHT: 77 IN | WEIGHT: 210.6 LBS | HEART RATE: 90 BPM | TEMPERATURE: 98.9 F | SYSTOLIC BLOOD PRESSURE: 112 MMHG | OXYGEN SATURATION: 97 % | RESPIRATION RATE: 20 BRPM | BODY MASS INDEX: 24.87 KG/M2 | DIASTOLIC BLOOD PRESSURE: 69 MMHG

## 2021-08-20 PROBLEM — R79.89 ELEVATED TROPONIN: Status: RESOLVED | Noted: 2021-08-10 | Resolved: 2021-08-20

## 2021-08-20 PROBLEM — R77.8 ELEVATED TROPONIN: Status: RESOLVED | Noted: 2021-08-10 | Resolved: 2021-08-20

## 2021-08-20 PROBLEM — R41.0 CONFUSION: Status: RESOLVED | Noted: 2021-08-11 | Resolved: 2021-08-20

## 2021-08-20 LAB
BACTERIA SPEC AEROBE CULT: NORMAL
BACTERIA SPEC AEROBE CULT: NORMAL
CREAT SERPL-MCNC: 0.77 MG/DL (ref 0.76–1.27)
DEPRECATED RDW RBC AUTO: 40 FL (ref 37–54)
ERYTHROCYTE [DISTWIDTH] IN BLOOD BY AUTOMATED COUNT: 12.5 % (ref 12.3–15.4)
GFR SERPL CREATININE-BSD FRML MDRD: 104 ML/MIN/1.73
HCT VFR BLD AUTO: 34.6 % (ref 37.5–51)
HGB BLD-MCNC: 11.1 G/DL (ref 13–17.7)
MCH RBC QN AUTO: 28.1 PG (ref 26.6–33)
MCHC RBC AUTO-ENTMCNC: 32.1 G/DL (ref 31.5–35.7)
MCV RBC AUTO: 87.6 FL (ref 79–97)
PLATELET # BLD AUTO: 348 10*3/MM3 (ref 140–450)
PMV BLD AUTO: 10.2 FL (ref 6–12)
RBC # BLD AUTO: 3.95 10*6/MM3 (ref 4.14–5.8)
WBC # BLD AUTO: 7.96 10*3/MM3 (ref 3.4–10.8)

## 2021-08-20 PROCEDURE — 82565 ASSAY OF CREATININE: CPT | Performed by: INTERNAL MEDICINE

## 2021-08-20 PROCEDURE — 85027 COMPLETE CBC AUTOMATED: CPT | Performed by: PSYCHIATRY & NEUROLOGY

## 2021-08-20 PROCEDURE — 25010000002 VANCOMYCIN 10 G RECONSTITUTED SOLUTION: Performed by: INTERNAL MEDICINE

## 2021-08-20 PROCEDURE — 25010000002 CEFTRIAXONE PER 250 MG: Performed by: INTERNAL MEDICINE

## 2021-08-20 PROCEDURE — 99238 HOSP IP/OBS DSCHRG MGMT 30/<: CPT | Performed by: FAMILY MEDICINE

## 2021-08-20 RX ORDER — ASPIRIN 81 MG/1
81 TABLET ORAL DAILY
Start: 2021-08-21 | End: 2021-10-15 | Stop reason: SDUPTHER

## 2021-08-20 RX ORDER — CYCLOBENZAPRINE HCL 5 MG
5 TABLET ORAL 3 TIMES DAILY PRN
Start: 2021-08-20 | End: 2021-10-08

## 2021-08-20 RX ORDER — CEFTRIAXONE 1 G/1
2 INJECTION, POWDER, FOR SOLUTION INTRAMUSCULAR; INTRAVENOUS EVERY 24 HOURS
Qty: 1 EACH | Refills: 0
Start: 2021-08-20 | End: 2021-09-07

## 2021-08-20 RX ORDER — ATORVASTATIN CALCIUM 40 MG/1
40 TABLET, FILM COATED ORAL DAILY
Start: 2021-08-21 | End: 2021-10-04 | Stop reason: SDUPTHER

## 2021-08-20 RX ORDER — LOSARTAN POTASSIUM 25 MG/1
12.5 TABLET ORAL
Start: 2021-08-21 | End: 2022-12-07 | Stop reason: SDUPTHER

## 2021-08-20 RX ORDER — TAMSULOSIN HYDROCHLORIDE 0.4 MG/1
0.4 CAPSULE ORAL DAILY
Qty: 30 CAPSULE
Start: 2021-08-21 | End: 2021-09-07

## 2021-08-20 RX ADMIN — VANCOMYCIN HYDROCHLORIDE 1250 MG: 10 INJECTION, POWDER, LYOPHILIZED, FOR SOLUTION INTRAVENOUS at 16:03

## 2021-08-20 RX ADMIN — CEFTRIAXONE SODIUM 2 G: 2 INJECTION, POWDER, FOR SOLUTION INTRAMUSCULAR; INTRAVENOUS at 20:37

## 2021-08-20 RX ADMIN — ASPIRIN 81 MG: 81 TABLET ORAL at 09:47

## 2021-08-20 RX ADMIN — TAMSULOSIN HYDROCHLORIDE 0.4 MG: 0.4 CAPSULE ORAL at 09:47

## 2021-08-20 RX ADMIN — ATORVASTATIN CALCIUM 40 MG: 40 TABLET, FILM COATED ORAL at 09:47

## 2021-08-20 RX ADMIN — VANCOMYCIN HYDROCHLORIDE 1250 MG: 10 INJECTION, POWDER, LYOPHILIZED, FOR SOLUTION INTRAVENOUS at 05:39

## 2021-08-20 RX ADMIN — SODIUM CHLORIDE, PRESERVATIVE FREE 10 ML: 5 INJECTION INTRAVENOUS at 09:52

## 2021-08-20 RX ADMIN — CLOPIDOGREL 75 MG: 75 TABLET, FILM COATED ORAL at 09:47

## 2021-08-20 RX ADMIN — CEFTRIAXONE SODIUM 2 G: 2 INJECTION, POWDER, FOR SOLUTION INTRAMUSCULAR; INTRAVENOUS at 10:00

## 2021-08-20 RX ADMIN — SODIUM CHLORIDE, PRESERVATIVE FREE 10 ML: 5 INJECTION INTRAVENOUS at 20:37

## 2021-08-20 RX ADMIN — LOSARTAN POTASSIUM 12.5 MG: 25 TABLET, FILM COATED ORAL at 09:47

## 2021-08-20 RX ADMIN — METOPROLOL SUCCINATE 25 MG: 25 TABLET, FILM COATED, EXTENDED RELEASE ORAL at 09:47

## 2021-08-20 NOTE — DISCHARGE SUMMARY
"[facility]   DISCHARGE SUMMARY       Date of Admission: 8/10/2021  Date of Discharge:  8/20/2021  Primary Care Physician: Shad Curiel MD    Presenting Problem/History of Present Illness:  Elevated troponin [R77.8]     Final Discharge Diagnoses:  Active Hospital Problems    Diagnosis    • Acute bacterial endocarditis    • Cerebrovascular accident (CVA) due to embolism of cerebral artery (CMS/HCC)    • Numbness of tongue        Consults: neurology, cardiology, ct surgery, inf diz    Procedures Performed: 2 d echo ,mri brain    Pertinent Test Results: noted enterococcus blood cultures    Chief Complaint on Day of Discharge: none    History of Present Illness on Day of Discharge: none     Hospital Course:  The patient is a 56 y.o. male who presented to [facility] with change in mental status, hx of fever and findings of embolic stroke on mri brain. His blood cultures was positive for enterococcus and subsequent echo did confirm vegetations on mitral valve. He does have hx of mv surgery at [facility] last year. Neurology was concerned about hemorrhage conversion of the embolic lesions and recommneded stopping the eliquis which was initiated due to atrial fib hx. Our ct surgeon recommended transferrring to [facility] due to complexity of his case. He was accepted and on this date was transferred by ems to [place]..      Condition on Discharge:  stable    Physical Exam on Discharge:  /69 (BP Location: Left arm, Patient Position: Lying)   Pulse 90   Temp 98.9 °F (37.2 °C) (Oral)   Resp 20   Ht 195.6 cm (77.01\")   Wt 95.5 kg (210 lb 9.6 oz)   SpO2 97%   BMI 24.97 kg/m²   Physical Exam  Constitutional:       Appearance: Normal appearance.   HENT:      Head: Normocephalic and atraumatic.   Cardiovascular:      Rate and Rhythm: Normal rate.      Pulses: Normal pulses.   Pulmonary:      Effort: Pulmonary effort is normal.   Abdominal:      General: Abdomen is flat. Bowel sounds " are normal.   Musculoskeletal:      Cervical back: Normal range of motion and neck supple.   Neurological:      Mental Status: He is alert.           Discharge Disposition:  Short Term Hospital (DC - External)    Discharge Medications:     Discharge Medications      New Medications      Instructions Start Date   aspirin 81 MG EC tablet   81 mg, Oral, Daily   Start Date: August 21, 2021     cefTRIAXone 1 g injection  Commonly known as: ROCEPHIN   2 g, Intramuscular, Every 24 Hours      losartan 25 MG tablet  Commonly known as: COZAAR   12.5 mg, Oral, Every 24 Hours Scheduled   Start Date: August 21, 2021     vancomycin   1,250 mg, Intravenous, Every 8 Hours         Changes to Medications      Instructions Start Date   atorvastatin 20 MG tablet  Commonly known as: LIPITOR  What changed: Another medication with the same name was changed. Make sure you understand how and when to take each.   20 mg, Oral, Daily      atorvastatin 40 MG tablet  Commonly known as: LIPITOR  What changed:   · medication strength  · how much to take   40 mg, Oral, Daily   Start Date: August 21, 2021        Continue These Medications      Instructions Start Date   clopidogrel 75 MG tablet  Commonly known as: PLAVIX   75 mg, Oral, Daily      Co Q 10 100 MG capsule   300 mg, Oral      cyclobenzaprine 5 MG tablet  Commonly known as: FLEXERIL   5 mg, Oral, 3 Times Daily PRN      metoprolol succinate XL 25 MG 24 hr tablet  Commonly known as: TOPROL-XL   25 mg, Oral, Daily      tamsulosin 0.4 MG capsule 24 hr capsule  Commonly known as: FLOMAX   1 capsule, Oral, Daily      tamsulosin 0.4 MG capsule 24 hr capsule  Commonly known as: FLOMAX   0.4 mg, Oral, Daily   Start Date: August 21, 2021        Stop These Medications    apixaban 5 MG tablet tablet  Commonly known as: ELIQUIS     furosemide 40 MG tablet  Commonly known as: LASIX     potassium chloride 20 MEQ CR tablet  Commonly known as: K-DUR,KLOR-CON        ASK your doctor about these medications       Instructions Start Date   doxycycline 100 MG capsule  Commonly known as: VIBRAMYCIN  Ask about: Should I take this medication?   100 mg, Oral, 2 Times Daily             Discharge Diet:     Activity at Discharge:     Discharge Care Plan/Instructions: he will need clarification of his rocephin and vancomycin dosing    Follow-up Appointments:   Future Appointments   Date Time Provider Department Center   9/7/2021  9:15 AM Shad Curiel MD [facility]   11/8/2021  9:30 AM REGINA PANG [facility]   11/15/2021  9:40 AM Michele Cota MD [facility]       Test Results Pending at Discharge: none    Shad Curiel MD  08/20/21  18:28 CDT    Time: 6:34pm

## 2021-08-20 NOTE — PAYOR COMM NOTE
"Teressa Piedra (56 y.o. Male) [id number]    Cont stay please review clinical  Pt remains in hospital  [facility] phone    Fax        Date of Birth Social Security Number Address Home Phone MRN    1965  [address] [place], [place] [address]MIGDALIA [facility] [id number] [phone] [id number]    Jew Marital Status          Mormonism        Admission Date Admission Type Admitting Provider Attending Provider Department, Room/Bed    8/10/21 Emergency Shad Curiel MD [place], Thomas Waldon, MD [facility] 4B, 448/1    Discharge Date Discharge Disposition Discharge Destination                       Attending Provider: Shad Curiel MD    Allergies: Penicillins    Isolation: None   Infection: None   Code Status: CPR    Ht: 195.6 cm (77.01\")   Wt: 95.5 kg (210 lb 9.6 oz)    Admission Cmt: None   Principal Problem: None                Active Insurance as of 8/10/2021     Primary Coverage     Payor Plan Insurance Group Employer/Plan Group    [id number] PPO [id number]     Payor Plan Address Payor Plan Phone Number Payor Plan Fax Number Effective Dates    [address] [phone]  12/24/2012 - None Entered    [place] [address]       Subscriber Name Subscriber Birth Date Member ID       TERESSA PIEDRA 1965 [id number]                 Emergency Contacts      (Rel.) Home Phone Work Phone Mobile Phone    Amparo Piedra (Spouse) [phone] -- [phone]              Current Facility-Administered Medications   Medication Dose Route Frequency Provider Last Rate Last Admin   • acetaminophen (TYLENOL) tablet 650 mg  650 mg Oral Q6H PRN Shad Curiel MD   650 mg at 08/13/21 1449   • aspirin EC tablet 81 mg  81 mg Oral Daily Knees, Malgorzata E, APRN   81 mg at 08/20/21 0947   • atorvastatin (LIPITOR) tablet 40 mg  40 mg Oral Daily Knees, Malgorzata E, APRN   40 mg at 08/20/21 0947   • cefTRIAXone (ROCEPHIN) 2 g in " "sodium chloride 0.9 % 100 mL IVPB-VTB  2 g Intravenous Q12H Elliott Rock  mL/hr at 08/20/21 1000 2 g at 08/20/21 1000   • clopidogrel (PLAVIX) tablet 75 mg  75 mg Oral Daily Shad Curiel MD   75 mg at 08/20/21 0947   • cyclobenzaprine (FLEXERIL) tablet 5 mg  5 mg Oral TID PRN Shad Curiel MD       • HYDROcodone-acetaminophen (NORCO) 5-325 MG per tablet 1 tablet  1 tablet Oral Q6H PRN Shad Curiel MD       • losartan (COZAAR) tablet 12.5 mg  12.5 mg Oral Q24H Knees, Malgorzata E, APRN   12.5 mg at 08/20/21 0947   • metoprolol succinate XL (TOPROL-XL) 24 hr tablet 25 mg  25 mg Oral Q24H Knees, Malgorzata E, APRN   25 mg at 08/20/21 0947   • sodium chloride 0.9 % flush 10 mL  10 mL Intravenous PRN Shad Curiel MD       • sodium chloride 0.9 % flush 10 mL  10 mL Intravenous Q12H Shad Curiel MD   10 mL at 08/20/21 0952   • tamsulosin (FLOMAX) 24 hr capsule 0.4 mg  0.4 mg Oral Daily Shad Curiel MD   0.4 mg at 08/20/21 0947   • vancomycin 1250 mg/250 mL 0.9% NS IVPB ([facility])  1,250 mg Intravenous Q8H Elliott Rock MD 0 mL/hr at 08/17/21 1500 1,250 mg at 08/20/21 0539        Physician Progress Notes (last 24 hours) (Notes from 08/19/21 1312 through 08/20/21 1312)      Shad Curiel MD at 08/19/21 1932        CC:\"im just waiting\"---waiting on bed at [place]--no new nursing staff concerns    Review of Systems   Constitutional: Positive for activity change.   HENT: Negative.    Eyes: Negative.    Respiratory: Negative.    Cardiovascular: Negative.    Gastrointestinal: Negative.    Endocrine: Negative.    Genitourinary: Negative.    Musculoskeletal: Negative.    Skin: Negative.    Allergic/Immunologic: Negative.    Neurological: Negative.    Hematological: Negative.    Psychiatric/Behavioral: Negative.      Temp:  [97.4 °F (36.3 °C)-98.3 °F (36.8 °C)] 97.7 °F (36.5 °C)  Heart Rate:  [86-91] 91  Resp:  [16] 16  BP: (112-117)/(63-68) 114/64  I/O last 3 completed " shifts:  In: 580 [P.O.:480; IV Piggyback:100]  Out: 4150 [Urine:4150]  No intake/output data recorded.    Physical Exam  Vitals and nursing note reviewed.   Constitutional:       Appearance: Normal appearance. He is normal weight.   HENT:      Head: Normocephalic and atraumatic.      Nose: Nose normal.      Mouth/Throat:      Mouth: Mucous membranes are moist.   Eyes:      Pupils: Pupils are equal, round, and reactive to light.   Cardiovascular:      Rate and Rhythm: Normal rate.      Pulses: Normal pulses.   Pulmonary:      Effort: Pulmonary effort is normal.   Abdominal:      General: Abdomen is flat.   Musculoskeletal:         General: Normal range of motion.      Cervical back: Normal range of motion and neck supple.   Skin:     General: Skin is warm.      Capillary Refill: Capillary refill takes less than 2 seconds.   Neurological:      General: No focal deficit present.      Mental Status: He is alert and oriented to person, place, and time. Mental status is at baseline.   Psychiatric:         Mood and Affect: Mood normal.         Behavior: Behavior normal.         Thought Content: Thought content normal.         Judgment: Judgment normal.           Elevated troponin    Confusion    Numbness of tongue    Acute bacterial endocarditis    Cerebrovascular accident (CVA) due to embolism of cerebral artery (CMS/[facility])    Await transfer    Electronically signed by Shad Curiel MD at 08/20/21 0734         8/20  Nurse note:  Sinus 68-86 with coup on tele. Continues on IV antibiotic. Continues on plavix. Continues on room air with saturation in the 90's. Anticipating discharge to [facility], waiting on bed. NIHH scale remains 0. QA Neuro checks performed. Medication education provided. Safety maintained.

## 2021-08-20 NOTE — PLAN OF CARE
Goal Outcome Evaluation:               VSS. No C/O pain. AAOX4. Sinus 68-86 with coup on tele. Continues on IV antibiotic. Continues on plavix. Continues on room air with saturation in the 90's. Anticipating discharge to [facility], waiting on bed. NIHH scale remains 0. QA Neuro checks performed. Medication education provided. Safety maintained.

## 2021-08-20 NOTE — PROGRESS NOTES
"Infectious Diseases Progress Note    Patient:  Robert Piedra  YOB: 1965  MRN: [id number]   Admit date: 8/10/2021   Admitting Physician: Shad Curiel MD  Primary Care Physician: Shad Curiel MD    Chief Complaint/Interval History: He is without new symptoms.  He is comfortable.  No CHF symptoms.  No symptoms to suggest TIA.  Talked with him about risks and benefits again of desensitization to allow treatment with ampicillin or penicillin.  He is agreeable to desensitization and accepts the risks.  He understands that there is a small risk of anaphylaxis. He indicates he just wants nursing to be available and keep a close eye on him during the desensitization process       Intake/Output Summary (Last 24 hours) at 8/20/2021 1124  Last data filed at 8/20/2021 1024  Gross per 24 hour   Intake 240 ml   Output 3300 ml   Net -3060 ml     Allergies:   Allergies   Allergen Reactions   • Penicillins Hives     Current Scheduled Medications:   aspirin, 81 mg, Oral, Daily  atorvastatin, 40 mg, Oral, Daily  cefTRIAXone, 2 g, Intravenous, Q12H  clopidogrel, 75 mg, Oral, Daily  losartan, 12.5 mg, Oral, Q24H  metoprolol succinate XL, 25 mg, Oral, Q24H  sodium chloride, 10 mL, Intravenous, Q12H  tamsulosin, 0.4 mg, Oral, Daily  vancomycin, 1,250 mg, Intravenous, Q8H      Current PRN Medications:  •  acetaminophen  •  cyclobenzaprine  •  HYDROcodone-acetaminophen  •  [COMPLETED] Insert peripheral IV **AND** sodium chloride    Review of Systems    Vital Signs:  /72 (BP Location: Left arm, Patient Position: Lying)   Pulse 84   Temp 97.7 °F (36.5 °C) (Oral)   Resp 16   Ht 195.6 cm (77.01\")   Wt 95.5 kg (210 lb 9.6 oz)   SpO2 100%   BMI 24.97 kg/m²     Physical Exam  Vital signs - reviewed.  Line/IV (PICC) site - No erythema, warmth, induration, or tenderness.  No new findings on lung, extremity, or heart exam.    Lab Results:  CBC:   Results from last 7 days   Lab Units 08/20/21  0700 " 08/19/21  0418 08/18/21  0504 08/17/21  0429 08/16/21 0223 08/15/21  0157 08/14/21 0429   WBC 10*3/mm3 7.96 8.30 7.61 6.22 8.18 9.08 9.41   HEMOGLOBIN g/dL 11.1* 10.1* 10.3* 10.4* 9.7* 10.0* 10.8*   HEMATOCRIT % 34.6* 30.9* 32.2* 31.9* 30.4* 31.3* 33.7*   PLATELETS 10*3/mm3 348 327 365 336 320 328 325     BMP:  Results from last 7 days   Lab Units 08/20/21  0508 08/19/21 0418 08/16/21 0223 08/16/21 0223   SODIUM mmol/L  --  137  --  136   POTASSIUM mmol/L  --  4.1  --  4.1   CHLORIDE mmol/L  --  104  --  104   CO2 mmol/L  --  27.0  --  27.0   BUN mg/dL  --  12  --  11   CREATININE mg/dL 0.77 0.74*  --  0.80   GLUCOSE mg/dL  --  96   < > 101*   CALCIUM mg/dL  --  8.7  --  8.8   ALT (SGPT) U/L  --  24  --   --     < > = values in this interval not displayed.     Culture Results:   Blood Culture   Date Value Ref Range Status   08/15/2021 No growth at 4 days  Preliminary   08/15/2021 No growth at 4 days  Preliminary     Radiology: None  Additional Studies Reviewed: None    Impression:   Enterococcal endocarditis  History of penicillin allergy    Recommendations:     Review of literature and up-to-date, treatment with ampicillin and ceftriaxone preferred over vancomycin.  Feel risk-benefit would be in favor of desensitization.  We will talk with nursing to make sure they can manage monitoring process for desensitization.  He otherwise may need to be transferred to the ends of care unit to complete desensitization.  Hope to make arrangements for desensitization in the next 1 to 2 days.  Will work with pharmacy      Elliott Castro MD

## 2021-08-20 NOTE — PROGRESS NOTES
"Pharmacy Dosing Service  Pharmacokinetics  Vancomycin Follow-up Evaluation    Assessment/Action/Plan:  Vancomycin trough evaluated/ no changes made yesterday.     AUC Model Data for current regimen:  Regimen: 1250 mg IV every 8 hours.  Exposure target: AUC24 (range)400-600 mg/L.hr   AUC24,ss: 500 mg/L.hr  PAUC*: 87 %  Ctrough,ss: 14.1 mg/L  Pconc*: 9 %  Tox.: 9 %    SCr=0.77 (stable). Ordered a follow-up Vancomycin trough w/ AM labs tomorrow. Pharmacy will continue to follow daily and adjust dose accordingly.     Subjective:  Robert Piedra is a 56 y.o. male currently on Vancomycin 1250 mg IV every 8 hours for the treatment of endocarditis. Current end date: 9-18-21    Objective:  Ht: 195.6 cm (77.01\"); Wt: 95.5 kg (210 lb 9.6 oz)  Estimated Creatinine Clearance: 143 mL/min (by C-G formula based on SCr of 0.77 mg/dL).     Creatinine   Date Value Ref Range Status   08/20/2021 0.77 0.76 - 1.27 mg/dL Final   08/19/2021 0.74 (L) 0.76 - 1.27 mg/dL Final   08/16/2021 0.80 0.76 - 1.27 mg/dL Final   08/02/2021 1 0.5 - 1.2 mg/dL Final   06/01/2021 0.9 0.5 - 1.2 mg/dL Final   05/27/2021 0.9 0.5 - 1.2 mg/dL Final      Lab Results   Component Value Date    WBC 7.96 08/20/2021    WBC 8.30 08/19/2021    WBC 7.61 08/18/2021         Lab Results   Component Value Date    VANCOTROUGH 15.10 08/19/2021       Culture Results:  Microbiology Results (last 10 days)       Procedure Component Value - Date/Time    Blood Culture With DEBBIE - Blood, Arm, Left [[id number]] Collected: 08/15/21 2042    Lab Status: Preliminary result Specimen: Blood from Arm, Left Updated: 08/19/21 2100     Blood Culture No growth at 4 days    Blood Culture With DEBBIE - Blood, Arm, Left [[id number]] Collected: 08/15/21 2040    Lab Status: Preliminary result Specimen: Blood from Arm, Left Updated: 08/19/21 2100     Blood Culture No growth at 4 days    Blood Culture With DEBBIE - Blood, Arm, Right [[id number]]  (Abnormal) Collected: 08/13/21 0813    Lab Status: Final result " Specimen: Blood from Arm, Right Updated: 08/16/21 1255     Blood Culture Enterococcus faecalis     Comment: Infectious disease consultation is highly recommended.        Isolated from Aerobic Bottle     Gram Stain Aerobic Bottle Gram positive cocci in chains      Anaerobic Bottle Gram positive cocci    Narrative:      Refer to previous blood culture collected on 8/12 for TONO's      Blood Culture With DEBBIE - Blood, Arm, Left [[id number]]  (Abnormal) Collected: 08/13/21 0813    Lab Status: Final result Specimen: Blood from Arm, Left Updated: 08/16/21 1257     Blood Culture Enterococcus faecalis     Comment: Infectious disease consultation is highly recommended.        Isolated from Anaerobic Bottle     Gram Stain Aerobic Bottle Gram positive cocci in chains      Anaerobic Bottle Gram positive cocci    Narrative:      Refer to previous blood culture collected on 8/12 for TONO's      Blood Culture - Blood, Arm, Right [[id number]]  (Abnormal) Collected: 08/12/21 1111    Lab Status: Edited Result - FINAL Specimen: Blood from Arm, Right Updated: 08/18/21 1218     Blood Culture Enterococcus faecalis     Comment: Infectious disease consultation is highly recommended.        Isolated from Aerobic Bottle     Gram Stain Aerobic Bottle Gram positive cocci in chains      Anaerobic Bottle Gram positive cocci     Comment: Appended report. These results have been appended to a previously final verified report.       Narrative:      Refer to previous blood culture collected on 8/12 for TONO's      Blood Culture - Blood, Arm, Left [[id number]]  (Abnormal)  (Susceptibility) Collected: 08/12/21 1111    Lab Status: Final result Specimen: Blood from Arm, Left Updated: 08/16/21 1253     Blood Culture Enterococcus faecalis     Comment: Infectious disease consultation is highly recommended.        Isolated from Aerobic Bottle     Gram Stain Aerobic Bottle Gram positive cocci in chains    Susceptibility        Enterococcus faecalis      TONO       Ampicillin Susceptible      Gentamicin High Level Synergy Susceptible      Vancomycin Susceptible                 Linear View                       Blood Culture ID, PCR - Blood, Arm, Left [[id number]]  (Abnormal) Collected: 08/12/21 1111    Lab Status: Final result Specimen: Blood from Arm, Left Updated: 08/13/21 1454     BCID, PCR Enterococcus spp. Identification by BCID PCR.     BOTTLE TYPE Aerobic Bottle            Juan Schwab, Lilliam   08/20/21 13:42 CDT

## 2021-08-20 NOTE — PROGRESS NOTES
"CC:\"im just waiting\"---waiting on bed at [facility]--no new nursing staff concerns    Review of Systems   Constitutional: Positive for activity change.   HENT: Negative.    Eyes: Negative.    Respiratory: Negative.    Cardiovascular: Negative.    Gastrointestinal: Negative.    Endocrine: Negative.    Genitourinary: Negative.    Musculoskeletal: Negative.    Skin: Negative.    Allergic/Immunologic: Negative.    Neurological: Negative.    Hematological: Negative.    Psychiatric/Behavioral: Negative.      Temp:  [97.4 °F (36.3 °C)-98.3 °F (36.8 °C)] 97.7 °F (36.5 °C)  Heart Rate:  [86-91] 91  Resp:  [16] 16  BP: (112-117)/(63-68) 114/64  I/O last 3 completed shifts:  In: 580 [P.O.:480; IV Piggyback:100]  Out: 4150 [Urine:4150]  No intake/output data recorded.    Physical Exam  Vitals and nursing note reviewed.   Constitutional:       Appearance: Normal appearance. He is normal weight.   HENT:      Head: Normocephalic and atraumatic.      Nose: Nose normal.      Mouth/Throat:      Mouth: Mucous membranes are moist.   Eyes:      Pupils: Pupils are equal, round, and reactive to light.   Cardiovascular:      Rate and Rhythm: Normal rate.      Pulses: Normal pulses.   Pulmonary:      Effort: Pulmonary effort is normal.   Abdominal:      General: Abdomen is flat.   Musculoskeletal:         General: Normal range of motion.      Cervical back: Normal range of motion and neck supple.   Skin:     General: Skin is warm.      Capillary Refill: Capillary refill takes less than 2 seconds.   Neurological:      General: No focal deficit present.      Mental Status: He is alert and oriented to person, place, and time. Mental status is at baseline.   Psychiatric:         Mood and Affect: Mood normal.         Behavior: Behavior normal.         Thought Content: Thought content normal.         Judgment: Judgment normal.           Elevated troponin    Confusion    Numbness of tongue    Acute bacterial endocarditis    Cerebrovascular " accident (CVA) due to embolism of cerebral artery (CMS/HCC)    Await transfer
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
## 2021-08-26 ENCOUNTER — HOME HEALTH ADMISSION (OUTPATIENT)
Dept: HOME HEALTH SERVICES | Facility: HOME HEALTHCARE | Age: 56
End: 2021-08-26

## 2021-08-26 ENCOUNTER — HOME CARE VISIT (OUTPATIENT)
Dept: HOME HEALTH SERVICES | Facility: CLINIC | Age: 56
End: 2021-08-26

## 2021-08-26 ENCOUNTER — TRANSCRIBE ORDERS (OUTPATIENT)
Dept: HOME HEALTH SERVICES | Facility: HOME HEALTHCARE | Age: 56
End: 2021-08-26

## 2021-08-26 VITALS
WEIGHT: 210 LBS | OXYGEN SATURATION: 97 % | HEIGHT: 77 IN | RESPIRATION RATE: 18 BRPM | DIASTOLIC BLOOD PRESSURE: 61 MMHG | SYSTOLIC BLOOD PRESSURE: 98 MMHG | BODY MASS INDEX: 24.79 KG/M2 | TEMPERATURE: 97.2 F | HEART RATE: 108 BPM

## 2021-08-26 DIAGNOSIS — Z98.890 PERSONAL HISTORY OF SURGERY TO HEART AND GREAT VESSELS, PRESENTING HAZARDS TO HEALTH: Primary | ICD-10-CM

## 2021-08-26 DIAGNOSIS — N13.8 BENIGN PROSTATIC HYPERPLASIA WITH URINARY OBSTRUCTION: ICD-10-CM

## 2021-08-26 DIAGNOSIS — N40.1 BENIGN PROSTATIC HYPERPLASIA WITH URINARY OBSTRUCTION: ICD-10-CM

## 2021-08-26 LAB
A PHAGOCYTOPH IGG TITR SER IF: NEGATIVE {TITER}
A PHAGOCYTOPH IGM TITR SER IF: NEGATIVE {TITER}
B BURGDOR IGG PATRN SER IB-IMP: NEGATIVE
B BURGDOR IGM PATRN SER IB-IMP: NEGATIVE
B BURGDOR18KD IGG SER QL IB: NORMAL
B BURGDOR23KD IGG SER QL IB: NORMAL
B BURGDOR23KD IGM SER QL IB: NORMAL
B BURGDOR28KD IGG SER QL IB: NORMAL
B BURGDOR30KD IGG SER QL IB: NORMAL
B BURGDOR39KD IGG SER QL IB: NORMAL
B BURGDOR39KD IGM SER QL IB: NORMAL
B BURGDOR41KD IGG SER QL IB: NORMAL
B BURGDOR41KD IGM SER QL IB: NORMAL
B BURGDOR45KD IGG SER QL IB: NORMAL
B BURGDOR58KD IGG SER QL IB: NORMAL
B BURGDOR66KD IGG SER QL IB: NORMAL
B BURGDOR93KD IGG SER QL IB: NORMAL
E CHAFFEENSIS IGG TITR SER IF: NEGATIVE {TITER}
E CHAFFEENSIS IGM TITR SER IF: NEGATIVE {TITER}
R RICKETTSI IGG SER QL IA: NORMAL
R RICKETTSI IGG TITR SER IF: ABNORMAL {TITER}
R RICKETTSI IGM SER-ACNC: 0.24 INDEX (ref 0–0.89)

## 2021-08-26 PROCEDURE — G0299 HHS/HOSPICE OF RN EA 15 MIN: HCPCS

## 2021-08-26 RX ORDER — TAMSULOSIN HYDROCHLORIDE 0.4 MG/1
CAPSULE ORAL
Qty: 90 CAPSULE | Refills: 3 | Status: SHIPPED | OUTPATIENT
Start: 2021-08-26 | End: 2022-12-02 | Stop reason: SDUPTHER

## 2021-08-27 ENCOUNTER — TELEPHONE (OUTPATIENT)
Dept: NEUROLOGY | Facility: CLINIC | Age: 56
End: 2021-08-27

## 2021-08-27 ENCOUNTER — HOME CARE VISIT (OUTPATIENT)
Dept: HOME HEALTH SERVICES | Facility: CLINIC | Age: 56
End: 2021-08-27

## 2021-08-27 VITALS
TEMPERATURE: 97.7 F | RESPIRATION RATE: 18 BRPM | DIASTOLIC BLOOD PRESSURE: 69 MMHG | SYSTOLIC BLOOD PRESSURE: 109 MMHG | HEART RATE: 99 BPM | OXYGEN SATURATION: 99 %

## 2021-08-27 PROCEDURE — G0299 HHS/HOSPICE OF RN EA 15 MIN: HCPCS

## 2021-08-27 NOTE — HOME HEALTH
Routine SN visit for blood draw. Pt alert and oriented. Denies complaints this am. Reports he did well with administration of meds last night.

## 2021-08-27 NOTE — TELEPHONE ENCOUNTER
Caller:  TERESSA    Relationship to patient: SELF    Best call back number: [phone]    New or established patient?  [x] New  [] Established    Date of discharge: 8-20-21    Facility discharged from:  [facility]    Diagnosis/Symptoms: CVA    L    Specialty Only: Did you see a [facility] provider?    [x] Yes  [] No  If so, who?  TONY ROSE AND

## 2021-08-30 ENCOUNTER — HOME CARE VISIT (OUTPATIENT)
Dept: HOME HEALTH SERVICES | Facility: CLINIC | Age: 56
End: 2021-08-30

## 2021-08-30 VITALS
OXYGEN SATURATION: 99 % | RESPIRATION RATE: 18 BRPM | HEART RATE: 89 BPM | TEMPERATURE: 98 F | DIASTOLIC BLOOD PRESSURE: 68 MMHG | SYSTOLIC BLOOD PRESSURE: 100 MMHG

## 2021-08-30 PROCEDURE — G0299 HHS/HOSPICE OF RN EA 15 MIN: HCPCS

## 2021-08-30 NOTE — HOME HEALTH
SN routine visit for labs. Pt alert and oriented. States he has been having issues with antibiotics taking longer to infuse than normal. Call placed to Dr. Richardson's office and he deferrred to Dr. Castro. Call placed to 's offcie and heplock flushes ordered in infusion routine. Denies any other concerns.

## 2021-08-31 ENCOUNTER — HOME CARE VISIT (OUTPATIENT)
Dept: HOME HEALTH SERVICES | Facility: CLINIC | Age: 56
End: 2021-08-31

## 2021-08-31 ENCOUNTER — LAB REQUISITION (OUTPATIENT)
Dept: LAB | Facility: HOSPITAL | Age: 56
End: 2021-08-31

## 2021-08-31 ENCOUNTER — HOME CARE VISIT (OUTPATIENT)
Dept: HOME HEALTH SERVICES | Facility: HOME HEALTHCARE | Age: 56
End: 2021-08-31

## 2021-08-31 VITALS
HEART RATE: 83 BPM | OXYGEN SATURATION: 98 % | SYSTOLIC BLOOD PRESSURE: 128 MMHG | DIASTOLIC BLOOD PRESSURE: 80 MMHG | TEMPERATURE: 97.6 F | RESPIRATION RATE: 20 BRPM

## 2021-08-31 DIAGNOSIS — Z00.00 ENCOUNTER FOR GENERAL ADULT MEDICAL EXAMINATION WITHOUT ABNORMAL FINDINGS: ICD-10-CM

## 2021-08-31 LAB
ALBUMIN SERPL-MCNC: 3.6 G/DL (ref 3.5–5.2)
ALBUMIN/GLOB SERPL: 1 G/DL
ALP SERPL-CCNC: 91 U/L (ref 39–117)
ALT SERPL W P-5'-P-CCNC: 29 U/L (ref 1–41)
ANION GAP SERPL CALCULATED.3IONS-SCNC: 11 MMOL/L (ref 5–15)
AST SERPL-CCNC: 27 U/L (ref 1–40)
BASOPHILS # BLD AUTO: 0.02 10*3/MM3 (ref 0–0.2)
BASOPHILS NFR BLD AUTO: 0.4 % (ref 0–1.5)
BILIRUB SERPL-MCNC: 0.3 MG/DL (ref 0–1.2)
BUN SERPL-MCNC: 18 MG/DL (ref 6–20)
BUN/CREAT SERPL: 16.2 (ref 7–25)
CALCIUM SPEC-SCNC: 8.9 MG/DL (ref 8.6–10.5)
CHLORIDE SERPL-SCNC: 107 MMOL/L (ref 98–107)
CO2 SERPL-SCNC: 25 MMOL/L (ref 22–29)
CREAT SERPL-MCNC: 1.11 MG/DL (ref 0.76–1.27)
CRP SERPL-MCNC: 0.8 MG/DL (ref 0–0.5)
DEPRECATED RDW RBC AUTO: 43.3 FL (ref 37–54)
EOSINOPHIL # BLD AUTO: 0.41 10*3/MM3 (ref 0–0.4)
EOSINOPHIL NFR BLD AUTO: 7.3 % (ref 0.3–6.2)
ERYTHROCYTE [DISTWIDTH] IN BLOOD BY AUTOMATED COUNT: 13.6 % (ref 12.3–15.4)
GENTAMICIN SERPL-MCNC: 0.4 MCG/ML (ref 0.5–2)
GFR SERPL CREATININE-BSD FRML MDRD: 68 ML/MIN/1.73
GLOBULIN UR ELPH-MCNC: 3.6 GM/DL
GLUCOSE SERPL-MCNC: 109 MG/DL (ref 65–99)
HCT VFR BLD AUTO: 32 % (ref 37.5–51)
HGB BLD-MCNC: 10.6 G/DL (ref 13–17.7)
IMM GRANULOCYTES # BLD AUTO: 0.01 10*3/MM3 (ref 0–0.05)
IMM GRANULOCYTES NFR BLD AUTO: 0.2 % (ref 0–0.5)
LYMPHOCYTES # BLD AUTO: 1.32 10*3/MM3 (ref 0.7–3.1)
LYMPHOCYTES NFR BLD AUTO: 23.5 % (ref 19.6–45.3)
MCH RBC QN AUTO: 29 PG (ref 26.6–33)
MCHC RBC AUTO-ENTMCNC: 33.1 G/DL (ref 31.5–35.7)
MCV RBC AUTO: 87.7 FL (ref 79–97)
MONOCYTES # BLD AUTO: 0.46 10*3/MM3 (ref 0.1–0.9)
MONOCYTES NFR BLD AUTO: 8.2 % (ref 5–12)
NEUTROPHILS NFR BLD AUTO: 3.4 10*3/MM3 (ref 1.7–7)
NEUTROPHILS NFR BLD AUTO: 60.4 % (ref 42.7–76)
NRBC BLD AUTO-RTO: 0 /100 WBC (ref 0–0.2)
PLATELET # BLD AUTO: 224 10*3/MM3 (ref 140–450)
PMV BLD AUTO: 11.6 FL (ref 6–12)
POTASSIUM SERPL-SCNC: 4.1 MMOL/L (ref 3.5–5.2)
PROT SERPL-MCNC: 7.2 G/DL (ref 6–8.5)
RBC # BLD AUTO: 3.65 10*6/MM3 (ref 4.14–5.8)
SODIUM SERPL-SCNC: 143 MMOL/L (ref 136–145)
VANCOMYCIN TROUGH SERPL-MCNC: 15 MCG/ML (ref 5–20)
WBC # BLD AUTO: 5.62 10*3/MM3 (ref 3.4–10.8)

## 2021-08-31 PROCEDURE — 80053 COMPREHEN METABOLIC PANEL: CPT | Performed by: INTERNAL MEDICINE

## 2021-08-31 PROCEDURE — 80202 ASSAY OF VANCOMYCIN: CPT | Performed by: INTERNAL MEDICINE

## 2021-08-31 PROCEDURE — 86140 C-REACTIVE PROTEIN: CPT | Performed by: INTERNAL MEDICINE

## 2021-08-31 PROCEDURE — 85025 COMPLETE CBC W/AUTO DIFF WBC: CPT | Performed by: INTERNAL MEDICINE

## 2021-08-31 PROCEDURE — 80170 ASSAY OF GENTAMICIN: CPT | Performed by: INTERNAL MEDICINE

## 2021-08-31 PROCEDURE — G0299 HHS/HOSPICE OF RN EA 15 MIN: HCPCS

## 2021-09-02 ENCOUNTER — TELEPHONE (OUTPATIENT)
Dept: FAMILY MEDICINE CLINIC | Facility: CLINIC | Age: 56
End: 2021-09-02

## 2021-09-02 NOTE — TELEPHONE ENCOUNTER
Caller: Robert Piedra    Relationship to patient: Self    Best call back number: [phone]    Patient is needing to confirm what dosage he should be taking of the Lipitor. Please advise.

## 2021-09-03 ENCOUNTER — HOME CARE VISIT (OUTPATIENT)
Dept: HOME HEALTH SERVICES | Facility: HOME HEALTHCARE | Age: 56
End: 2021-09-03

## 2021-09-06 ENCOUNTER — HOME CARE VISIT (OUTPATIENT)
Dept: HOME HEALTH SERVICES | Facility: CLINIC | Age: 56
End: 2021-09-06

## 2021-09-06 VITALS
RESPIRATION RATE: 18 BRPM | DIASTOLIC BLOOD PRESSURE: 68 MMHG | OXYGEN SATURATION: 99 % | HEART RATE: 91 BPM | SYSTOLIC BLOOD PRESSURE: 102 MMHG | TEMPERATURE: 98.5 F

## 2021-09-06 PROCEDURE — G0299 HHS/HOSPICE OF RN EA 15 MIN: HCPCS

## 2021-09-06 NOTE — HOME HEALTH
No recent falls, no insurance changes, and no recent medication changes. There was no need to contact the MD. Pt/cg had no further questions or concerns regarding the pt's medications or plan of care. A&O X 4. No c/o pain. VSS. CBC, CMP, CRP, vacomycin and gentamycin trough collected via PICC line and [facility] I will be calling [facility] about the pt needing more alcohol swabs, biopatch's, and heparin.

## 2021-09-07 ENCOUNTER — OFFICE VISIT (OUTPATIENT)
Dept: FAMILY MEDICINE CLINIC | Facility: CLINIC | Age: 56
End: 2021-09-07

## 2021-09-07 VITALS
HEIGHT: 77 IN | WEIGHT: 214 LBS | SYSTOLIC BLOOD PRESSURE: 100 MMHG | DIASTOLIC BLOOD PRESSURE: 68 MMHG | HEART RATE: 70 BPM | RESPIRATION RATE: 18 BRPM | BODY MASS INDEX: 25.27 KG/M2 | OXYGEN SATURATION: 100 % | TEMPERATURE: 97.8 F

## 2021-09-07 DIAGNOSIS — I33.0 ACUTE BACTERIAL ENDOCARDITIS: Primary | ICD-10-CM

## 2021-09-07 PROBLEM — Z53.1 REFUSAL OF BLOOD TRANSFUSIONS AS PATIENT IS JEHOVAH'S WITNESS: Status: ACTIVE | Noted: 2021-08-21

## 2021-09-07 PROBLEM — R06.00 DYSPNEA: Status: ACTIVE | Noted: 2020-08-24

## 2021-09-07 PROBLEM — K21.9 GERD (GASTROESOPHAGEAL REFLUX DISEASE): Status: ACTIVE | Noted: 2019-08-08

## 2021-09-07 PROBLEM — I25.10 CORONARY ARTERY DISEASE: Status: ACTIVE | Noted: 2021-08-21

## 2021-09-07 PROBLEM — I25.5 ISCHEMIC CARDIOMYOPATHY: Status: ACTIVE | Noted: 2021-05-27

## 2021-09-07 PROBLEM — I34.0 NONRHEUMATIC MITRAL VALVE REGURGITATION: Status: ACTIVE | Noted: 2020-05-13

## 2021-09-07 PROBLEM — I48.19 PERSISTENT ATRIAL FIBRILLATION: Status: ACTIVE | Noted: 2019-08-08

## 2021-09-07 PROBLEM — I05.9 ENDOCARDITIS OF MITRAL VALVE: Status: ACTIVE | Noted: 2021-08-21

## 2021-09-07 PROBLEM — IMO0001 PATIENT IS JEHOVAH'S WITNESS: Status: ACTIVE | Noted: 2021-09-07

## 2021-09-07 PROBLEM — R94.31 ABNORMAL ELECTROCARDIOGRAM: Status: ACTIVE | Noted: 2021-09-07

## 2021-09-07 PROBLEM — N40.0 BPH (BENIGN PROSTATIC HYPERPLASIA): Status: ACTIVE | Noted: 2019-08-08

## 2021-09-07 PROBLEM — IMO0001 REFUSAL OF BLOOD TRANSFUSIONS AS PATIENT IS JEHOVAH'S WITNESS: Status: ACTIVE | Noted: 2021-08-21

## 2021-09-07 PROBLEM — R73.03 PRE-DIABETES: Status: ACTIVE | Noted: 2019-08-08

## 2021-09-07 PROBLEM — Z98.890 S/P MVR (MITRAL VALVE REPAIR): Status: ACTIVE | Noted: 2020-08-21

## 2021-09-07 PROBLEM — I82.409 DVT (DEEP VENOUS THROMBOSIS): Status: ACTIVE | Noted: 2021-08-21

## 2021-09-07 PROBLEM — I44.2 COMPLETE HEART BLOCK: Status: ACTIVE | Noted: 2020-08-22

## 2021-09-07 PROBLEM — F41.9 ANXIETY: Status: ACTIVE | Noted: 2019-08-09

## 2021-09-07 PROBLEM — I50.9 CONGESTIVE HEART FAILURE (CHF) (HCC): Status: ACTIVE | Noted: 2019-08-09

## 2021-09-07 PROBLEM — I50.22 CHRONIC SYSTOLIC CONGESTIVE HEART FAILURE (HCC): Status: ACTIVE | Noted: 2021-09-07

## 2021-09-07 PROBLEM — D64.9 ANEMIA: Status: ACTIVE | Noted: 2021-08-21

## 2021-09-07 PROCEDURE — 99213 OFFICE O/P EST LOW 20 MIN: CPT | Performed by: FAMILY MEDICINE
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
## 2021-09-08 ENCOUNTER — HOME CARE VISIT (OUTPATIENT)
Dept: HOME HEALTH SERVICES | Facility: HOME HEALTHCARE | Age: 56
End: 2021-09-08

## 2021-09-09 ENCOUNTER — HOSPITAL ENCOUNTER (EMERGENCY)
Facility: HOSPITAL | Age: 56
Discharge: HOME | End: 2021-09-10
Attending: FAMILY MEDICINE | Admitting: FAMILY MEDICINE

## 2021-09-09 ENCOUNTER — LAB (OUTPATIENT)
Dept: LAB | Facility: HOSPITAL | Age: 56
End: 2021-09-09

## 2021-09-09 ENCOUNTER — APPOINTMENT (OUTPATIENT)
Dept: CT IMAGING | Facility: HOSPITAL | Age: 56
End: 2021-09-09

## 2021-09-09 ENCOUNTER — APPOINTMENT (OUTPATIENT)
Dept: GENERAL RADIOLOGY | Facility: HOSPITAL | Age: 56
End: 2021-09-09

## 2021-09-09 ENCOUNTER — TRANSCRIBE ORDERS (OUTPATIENT)
Dept: ADMINISTRATIVE | Facility: HOSPITAL | Age: 56
End: 2021-09-09

## 2021-09-09 ENCOUNTER — HOME CARE VISIT (OUTPATIENT)
Dept: HOME HEALTH SERVICES | Facility: HOME HEALTHCARE | Age: 56
End: 2021-09-09

## 2021-09-09 DIAGNOSIS — I33.0 ACUTE AND SUBACUTE BACTERIAL ENDOCARDITIS: Primary | ICD-10-CM

## 2021-09-09 DIAGNOSIS — I63.40 CEREBRAL INFARCTION DUE TO EMBOLISM OF CEREBRAL ARTERY (HCC): ICD-10-CM

## 2021-09-09 DIAGNOSIS — I50.20 HEART FAILURE WITH REDUCED LEFT VENTRICULAR FUNCTION (HCC): ICD-10-CM

## 2021-09-09 DIAGNOSIS — R42 DIZZINESS: ICD-10-CM

## 2021-09-09 DIAGNOSIS — I33.0 ACUTE AND SUBACUTE BACTERIAL ENDOCARDITIS: ICD-10-CM

## 2021-09-09 DIAGNOSIS — R51.9 NONINTRACTABLE HEADACHE, UNSPECIFIED CHRONICITY PATTERN, UNSPECIFIED HEADACHE TYPE: Primary | ICD-10-CM

## 2021-09-09 LAB
ALBUMIN SERPL-MCNC: 3.8 G/DL (ref 3.5–5.2)
ALBUMIN/GLOB SERPL: 1.1 G/DL
ALP SERPL-CCNC: 92 U/L (ref 39–117)
ALT SERPL W P-5'-P-CCNC: 27 U/L (ref 1–41)
ANION GAP SERPL CALCULATED.3IONS-SCNC: 6 MMOL/L (ref 5–15)
ANION GAP SERPL CALCULATED.3IONS-SCNC: 9 MMOL/L (ref 5–15)
APTT PPP: 34.1 SECONDS (ref 24.1–35)
AST SERPL-CCNC: 25 U/L (ref 1–40)
BACTERIA UR QL AUTO: ABNORMAL /HPF
BASOPHILS # BLD AUTO: 0.01 10*3/MM3 (ref 0–0.2)
BASOPHILS NFR BLD AUTO: 0.1 % (ref 0–1.5)
BILIRUB SERPL-MCNC: 0.4 MG/DL (ref 0–1.2)
BILIRUB UR QL STRIP: NEGATIVE
BUN SERPL-MCNC: 14 MG/DL (ref 6–20)
BUN SERPL-MCNC: 16 MG/DL (ref 6–20)
BUN/CREAT SERPL: 10.1 (ref 7–25)
BUN/CREAT SERPL: 10.6 (ref 7–25)
CALCIUM SPEC-SCNC: 9.1 MG/DL (ref 8.6–10.5)
CALCIUM SPEC-SCNC: 9.6 MG/DL (ref 8.6–10.5)
CHLORIDE SERPL-SCNC: 103 MMOL/L (ref 98–107)
CHLORIDE SERPL-SCNC: 107 MMOL/L (ref 98–107)
CLARITY UR: CLEAR
CO2 SERPL-SCNC: 23 MMOL/L (ref 22–29)
CO2 SERPL-SCNC: 27 MMOL/L (ref 22–29)
COLOR UR: YELLOW
CREAT SERPL-MCNC: 1.39 MG/DL (ref 0.76–1.27)
CREAT SERPL-MCNC: 1.51 MG/DL (ref 0.76–1.27)
DEPRECATED RDW RBC AUTO: 45.3 FL (ref 37–54)
EOSINOPHIL # BLD AUTO: 0.26 10*3/MM3 (ref 0–0.4)
EOSINOPHIL NFR BLD AUTO: 3.5 % (ref 0.3–6.2)
ERYTHROCYTE [DISTWIDTH] IN BLOOD BY AUTOMATED COUNT: 14.1 % (ref 12.3–15.4)
GFR SERPL CREATININE-BSD FRML MDRD: 48 ML/MIN/1.73
GFR SERPL CREATININE-BSD FRML MDRD: 53 ML/MIN/1.73
GLOBULIN UR ELPH-MCNC: 3.5 GM/DL
GLUCOSE SERPL-MCNC: 101 MG/DL (ref 65–99)
GLUCOSE SERPL-MCNC: 108 MG/DL (ref 65–99)
GLUCOSE UR STRIP-MCNC: NEGATIVE MG/DL
HCT VFR BLD AUTO: 32.6 % (ref 37.5–51)
HGB BLD-MCNC: 10.7 G/DL (ref 13–17.7)
HGB UR QL STRIP.AUTO: NEGATIVE
HOLD SPECIMEN: NORMAL
HOLD SPECIMEN: NORMAL
HYALINE CASTS UR QL AUTO: ABNORMAL /LPF
IMM GRANULOCYTES # BLD AUTO: 0.02 10*3/MM3 (ref 0–0.05)
IMM GRANULOCYTES NFR BLD AUTO: 0.3 % (ref 0–0.5)
INR PPP: 1.12 (ref 0.91–1.09)
KETONES UR QL STRIP: NEGATIVE
LEUKOCYTE ESTERASE UR QL STRIP.AUTO: ABNORMAL
LYMPHOCYTES # BLD AUTO: 0.99 10*3/MM3 (ref 0.7–3.1)
LYMPHOCYTES NFR BLD AUTO: 13.3 % (ref 19.6–45.3)
MAGNESIUM SERPL-MCNC: 2.1 MG/DL (ref 1.6–2.6)
MCH RBC QN AUTO: 28.8 PG (ref 26.6–33)
MCHC RBC AUTO-ENTMCNC: 32.8 G/DL (ref 31.5–35.7)
MCV RBC AUTO: 87.9 FL (ref 79–97)
MONOCYTES # BLD AUTO: 0.58 10*3/MM3 (ref 0.1–0.9)
MONOCYTES NFR BLD AUTO: 7.8 % (ref 5–12)
NEUTROPHILS NFR BLD AUTO: 5.58 10*3/MM3 (ref 1.7–7)
NEUTROPHILS NFR BLD AUTO: 75 % (ref 42.7–76)
NITRITE UR QL STRIP: NEGATIVE
NRBC BLD AUTO-RTO: 0 /100 WBC (ref 0–0.2)
PH UR STRIP.AUTO: <=5 [PH] (ref 5–8)
PLATELET # BLD AUTO: 203 10*3/MM3 (ref 140–450)
PMV BLD AUTO: 10.4 FL (ref 6–12)
POTASSIUM SERPL-SCNC: 3.9 MMOL/L (ref 3.5–5.2)
POTASSIUM SERPL-SCNC: 4.9 MMOL/L (ref 3.5–5.2)
PROT SERPL-MCNC: 7.3 G/DL (ref 6–8.5)
PROT UR QL STRIP: NEGATIVE
PROTHROMBIN TIME: 13.5 SECONDS (ref 11.5–13.4)
RBC # BLD AUTO: 3.71 10*6/MM3 (ref 4.14–5.8)
RBC # UR: ABNORMAL /HPF
REF LAB TEST METHOD: ABNORMAL
SARS-COV-2 RNA PNL SPEC NAA+PROBE: NOT DETECTED
SODIUM SERPL-SCNC: 135 MMOL/L (ref 136–145)
SODIUM SERPL-SCNC: 140 MMOL/L (ref 136–145)
SP GR UR STRIP: 1.02 (ref 1–1.03)
SQUAMOUS #/AREA URNS HPF: ABNORMAL /HPF
TROPONIN T SERPL-MCNC: <0.01 NG/ML (ref 0–0.03)
UROBILINOGEN UR QL STRIP: ABNORMAL
WBC # BLD AUTO: 7.44 10*3/MM3 (ref 3.4–10.8)
WBC UR QL AUTO: ABNORMAL /HPF
WHOLE BLOOD HOLD SPECIMEN: NORMAL
WHOLE BLOOD HOLD SPECIMEN: NORMAL

## 2021-09-09 PROCEDURE — 70496 CT ANGIOGRAPHY HEAD: CPT

## 2021-09-09 PROCEDURE — 85730 THROMBOPLASTIN TIME PARTIAL: CPT | Performed by: NURSE PRACTITIONER

## 2021-09-09 PROCEDURE — 87635 SARS-COV-2 COVID-19 AMP PRB: CPT | Performed by: NURSE PRACTITIONER

## 2021-09-09 PROCEDURE — 80048 BASIC METABOLIC PNL TOTAL CA: CPT

## 2021-09-09 PROCEDURE — 84484 ASSAY OF TROPONIN QUANT: CPT

## 2021-09-09 PROCEDURE — 83735 ASSAY OF MAGNESIUM: CPT

## 2021-09-09 PROCEDURE — 85610 PROTHROMBIN TIME: CPT | Performed by: NURSE PRACTITIONER

## 2021-09-09 PROCEDURE — 36415 COLL VENOUS BLD VENIPUNCTURE: CPT

## 2021-09-09 PROCEDURE — 85025 COMPLETE CBC W/AUTO DIFF WBC: CPT

## 2021-09-09 PROCEDURE — 99283 EMERGENCY DEPT VISIT LOW MDM: CPT

## 2021-09-09 PROCEDURE — 71045 X-RAY EXAM CHEST 1 VIEW: CPT

## 2021-09-09 PROCEDURE — 99284 EMERGENCY DEPT VISIT MOD MDM: CPT

## 2021-09-09 PROCEDURE — 70498 CT ANGIOGRAPHY NECK: CPT

## 2021-09-09 PROCEDURE — 0 IOPAMIDOL PER 1 ML: Performed by: NURSE PRACTITIONER

## 2021-09-09 PROCEDURE — 80053 COMPREHEN METABOLIC PANEL: CPT

## 2021-09-09 PROCEDURE — 81001 URINALYSIS AUTO W/SCOPE: CPT

## 2021-09-09 PROCEDURE — 70450 CT HEAD/BRAIN W/O DYE: CPT

## 2021-09-09 RX ORDER — SODIUM CHLORIDE 0.9 % (FLUSH) 0.9 %
10 SYRINGE (ML) INJECTION AS NEEDED
Status: DISCONTINUED | OUTPATIENT
Start: 2021-09-09 | End: 2021-09-10 | Stop reason: HOSPADM

## 2021-09-09 RX ADMIN — IOPAMIDOL 100 ML: 755 INJECTION, SOLUTION INTRAVENOUS at 20:49

## 2021-09-10 ENCOUNTER — APPOINTMENT (OUTPATIENT)
Dept: MRI IMAGING | Facility: HOSPITAL | Age: 56
End: 2021-09-10

## 2021-09-10 VITALS
TEMPERATURE: 97.7 F | SYSTOLIC BLOOD PRESSURE: 148 MMHG | DIASTOLIC BLOOD PRESSURE: 80 MMHG | HEART RATE: 84 BPM | RESPIRATION RATE: 16 BRPM | HEIGHT: 77 IN | BODY MASS INDEX: 24.79 KG/M2 | WEIGHT: 210 LBS | OXYGEN SATURATION: 99 %

## 2021-09-10 PROCEDURE — 70551 MRI BRAIN STEM W/O DYE: CPT

## 2021-09-10 NOTE — ED NOTES
Pt states that he takes IV vancomycin at home for an infection in his heart. Pt states that he was due for a dose at 2100. Spoke with LUDWIG Thompson and explained this and she states that it is okay for him to administer this medication at this time.      Lynda Bond, RN  09/09/21 3838
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
## 2021-09-13 ENCOUNTER — HOME CARE VISIT (OUTPATIENT)
Dept: HOME HEALTH SERVICES | Facility: CLINIC | Age: 56
End: 2021-09-13

## 2021-09-13 ENCOUNTER — LAB REQUISITION (OUTPATIENT)
Dept: LAB | Facility: HOSPITAL | Age: 56
End: 2021-09-13

## 2021-09-13 VITALS
RESPIRATION RATE: 20 BRPM | SYSTOLIC BLOOD PRESSURE: 136 MMHG | OXYGEN SATURATION: 98 % | TEMPERATURE: 98.3 F | HEART RATE: 87 BPM | DIASTOLIC BLOOD PRESSURE: 80 MMHG

## 2021-09-13 DIAGNOSIS — Z48.812 ENCOUNTER FOR SURGICAL AFTERCARE FOLLOWING SURGERY ON THE CIRCULATORY SYSTEM: ICD-10-CM

## 2021-09-13 LAB
ALBUMIN SERPL-MCNC: 3.9 G/DL (ref 3.5–5.2)
ALBUMIN/GLOB SERPL: 1 G/DL
ALP SERPL-CCNC: 97 U/L (ref 39–117)
ALT SERPL W P-5'-P-CCNC: 25 U/L (ref 1–41)
ANION GAP SERPL CALCULATED.3IONS-SCNC: 10 MMOL/L (ref 5–15)
AST SERPL-CCNC: 25 U/L (ref 1–40)
BASOPHILS # BLD AUTO: 0.01 10*3/MM3 (ref 0–0.2)
BASOPHILS NFR BLD AUTO: 0.1 % (ref 0–1.5)
BILIRUB SERPL-MCNC: 0.3 MG/DL (ref 0–1.2)
BUN SERPL-MCNC: 20 MG/DL (ref 6–20)
BUN/CREAT SERPL: 13.3 (ref 7–25)
CALCIUM SPEC-SCNC: 9.5 MG/DL (ref 8.6–10.5)
CHLORIDE SERPL-SCNC: 104 MMOL/L (ref 98–107)
CO2 SERPL-SCNC: 27 MMOL/L (ref 22–29)
CREAT SERPL-MCNC: 1.5 MG/DL (ref 0.76–1.27)
DEPRECATED RDW RBC AUTO: 45.6 FL (ref 37–54)
EOSINOPHIL # BLD AUTO: 0.37 10*3/MM3 (ref 0–0.4)
EOSINOPHIL NFR BLD AUTO: 5.1 % (ref 0.3–6.2)
ERYTHROCYTE [DISTWIDTH] IN BLOOD BY AUTOMATED COUNT: 14.2 % (ref 12.3–15.4)
GFR SERPL CREATININE-BSD FRML MDRD: 48 ML/MIN/1.73
GLOBULIN UR ELPH-MCNC: 3.8 GM/DL
GLUCOSE SERPL-MCNC: 131 MG/DL (ref 65–99)
HCT VFR BLD AUTO: 37.6 % (ref 37.5–51)
HGB BLD-MCNC: 12 G/DL (ref 13–17.7)
IMM GRANULOCYTES # BLD AUTO: 0.02 10*3/MM3 (ref 0–0.05)
IMM GRANULOCYTES NFR BLD AUTO: 0.3 % (ref 0–0.5)
LYMPHOCYTES # BLD AUTO: 1.03 10*3/MM3 (ref 0.7–3.1)
LYMPHOCYTES NFR BLD AUTO: 14.3 % (ref 19.6–45.3)
MCH RBC QN AUTO: 28.3 PG (ref 26.6–33)
MCHC RBC AUTO-ENTMCNC: 31.9 G/DL (ref 31.5–35.7)
MCV RBC AUTO: 88.7 FL (ref 79–97)
MONOCYTES # BLD AUTO: 0.47 10*3/MM3 (ref 0.1–0.9)
MONOCYTES NFR BLD AUTO: 6.5 % (ref 5–12)
NEUTROPHILS NFR BLD AUTO: 5.3 10*3/MM3 (ref 1.7–7)
NEUTROPHILS NFR BLD AUTO: 73.7 % (ref 42.7–76)
NRBC BLD AUTO-RTO: 0 /100 WBC (ref 0–0.2)
PLATELET # BLD AUTO: 224 10*3/MM3 (ref 140–450)
PMV BLD AUTO: 11.4 FL (ref 6–12)
POTASSIUM SERPL-SCNC: 5 MMOL/L (ref 3.5–5.2)
PROT SERPL-MCNC: 7.7 G/DL (ref 6–8.5)
RBC # BLD AUTO: 4.24 10*6/MM3 (ref 4.14–5.8)
SODIUM SERPL-SCNC: 141 MMOL/L (ref 136–145)
VANCOMYCIN TROUGH SERPL-MCNC: 8.3 MCG/ML (ref 5–20)
WBC # BLD AUTO: 7.2 10*3/MM3 (ref 3.4–10.8)

## 2021-09-13 PROCEDURE — 80053 COMPREHEN METABOLIC PANEL: CPT | Performed by: INTERNAL MEDICINE

## 2021-09-13 PROCEDURE — G0299 HHS/HOSPICE OF RN EA 15 MIN: HCPCS

## 2021-09-13 PROCEDURE — 85025 COMPLETE CBC W/AUTO DIFF WBC: CPT | Performed by: INTERNAL MEDICINE

## 2021-09-13 PROCEDURE — 80202 ASSAY OF VANCOMYCIN: CPT | Performed by: INTERNAL MEDICINE

## 2021-09-13 NOTE — HOME HEALTH
SN visit for PICC line dressing change and blood draw. Pt alert and oriented. Complains of mild headache at this visit. States he had to go to [facility] ER this past Thursday for headache, dizziness and visual disturbances. States he was concerned he was having another stroke. Pt had CT scan and MRI and was discharged home. States he has had intermittent headache since them. Advised pt if symptoms return to call 911 and he voices understanding. Venipuncture to left ac per 23 gauge butterfly x1 attempt. Red, green and purple top tubes collected and labeled in the presence of pt. Site care performed. Tolerated well. Specimens to be taken to [facility] for resulting.

## 2021-09-14 ENCOUNTER — HOME CARE VISIT (OUTPATIENT)
Dept: HOME HEALTH SERVICES | Facility: CLINIC | Age: 56
End: 2021-09-14

## 2021-09-14 VITALS
RESPIRATION RATE: 16 BRPM | HEART RATE: 94 BPM | DIASTOLIC BLOOD PRESSURE: 55 MMHG | OXYGEN SATURATION: 99 % | SYSTOLIC BLOOD PRESSURE: 110 MMHG | TEMPERATURE: 97.1 F

## 2021-09-14 PROCEDURE — G0151 HHCP-SERV OF PT,EA 15 MIN: HCPCS

## 2021-09-15 ENCOUNTER — LAB REQUISITION (OUTPATIENT)
Dept: LAB | Facility: HOSPITAL | Age: 56
End: 2021-09-15

## 2021-09-15 ENCOUNTER — OFFICE VISIT (OUTPATIENT)
Dept: NEUROLOGY | Facility: CLINIC | Age: 56
End: 2021-09-15

## 2021-09-15 ENCOUNTER — HOME CARE VISIT (OUTPATIENT)
Dept: HOME HEALTH SERVICES | Facility: CLINIC | Age: 56
End: 2021-09-15

## 2021-09-15 VITALS
WEIGHT: 210 LBS | OXYGEN SATURATION: 99 % | HEART RATE: 89 BPM | RESPIRATION RATE: 20 BRPM | HEIGHT: 77 IN | DIASTOLIC BLOOD PRESSURE: 74 MMHG | BODY MASS INDEX: 24.79 KG/M2 | SYSTOLIC BLOOD PRESSURE: 132 MMHG

## 2021-09-15 VITALS
HEART RATE: 91 BPM | TEMPERATURE: 98.4 F | DIASTOLIC BLOOD PRESSURE: 88 MMHG | RESPIRATION RATE: 22 BRPM | SYSTOLIC BLOOD PRESSURE: 148 MMHG | OXYGEN SATURATION: 98 %

## 2021-09-15 DIAGNOSIS — E78.2 MIXED HYPERLIPIDEMIA: ICD-10-CM

## 2021-09-15 DIAGNOSIS — Z00.00 ENCOUNTER FOR GENERAL ADULT MEDICAL EXAMINATION WITHOUT ABNORMAL FINDINGS: ICD-10-CM

## 2021-09-15 DIAGNOSIS — I10 ESSENTIAL HYPERTENSION: ICD-10-CM

## 2021-09-15 DIAGNOSIS — I63.40 CEREBROVASCULAR ACCIDENT (CVA) DUE TO EMBOLISM OF CEREBRAL ARTERY (HCC): Primary | ICD-10-CM

## 2021-09-15 DIAGNOSIS — R42 DIZZINESS: ICD-10-CM

## 2021-09-15 LAB
ALBUMIN SERPL-MCNC: 3.9 G/DL (ref 3.5–5.2)
ALBUMIN/GLOB SERPL: 1.1 G/DL
ALP SERPL-CCNC: 102 U/L (ref 39–117)
ALT SERPL W P-5'-P-CCNC: 26 U/L (ref 1–41)
ANION GAP SERPL CALCULATED.3IONS-SCNC: 10 MMOL/L (ref 5–15)
AST SERPL-CCNC: 24 U/L (ref 1–40)
BASOPHILS # BLD AUTO: 0.01 10*3/MM3 (ref 0–0.2)
BASOPHILS NFR BLD AUTO: 0.2 % (ref 0–1.5)
BILIRUB SERPL-MCNC: 0.5 MG/DL (ref 0–1.2)
BUN SERPL-MCNC: 19 MG/DL (ref 6–20)
BUN/CREAT SERPL: 14.8 (ref 7–25)
CALCIUM SPEC-SCNC: 9.4 MG/DL (ref 8.6–10.5)
CHLORIDE SERPL-SCNC: 104 MMOL/L (ref 98–107)
CO2 SERPL-SCNC: 24 MMOL/L (ref 22–29)
CREAT SERPL-MCNC: 1.28 MG/DL (ref 0.76–1.27)
CRP SERPL-MCNC: 1.55 MG/DL (ref 0–0.5)
DEPRECATED RDW RBC AUTO: 43.1 FL (ref 37–54)
EOSINOPHIL # BLD AUTO: 0.26 10*3/MM3 (ref 0–0.4)
EOSINOPHIL NFR BLD AUTO: 4 % (ref 0.3–6.2)
ERYTHROCYTE [DISTWIDTH] IN BLOOD BY AUTOMATED COUNT: 13.8 % (ref 12.3–15.4)
GFR SERPL CREATININE-BSD FRML MDRD: 58 ML/MIN/1.73
GLOBULIN UR ELPH-MCNC: 3.6 GM/DL
GLUCOSE SERPL-MCNC: 103 MG/DL (ref 65–99)
HCT VFR BLD AUTO: 36.8 % (ref 37.5–51)
HGB BLD-MCNC: 12.1 G/DL (ref 13–17.7)
IMM GRANULOCYTES # BLD AUTO: 0.02 10*3/MM3 (ref 0–0.05)
IMM GRANULOCYTES NFR BLD AUTO: 0.3 % (ref 0–0.5)
LYMPHOCYTES # BLD AUTO: 1.03 10*3/MM3 (ref 0.7–3.1)
LYMPHOCYTES NFR BLD AUTO: 16 % (ref 19.6–45.3)
MCH RBC QN AUTO: 28.5 PG (ref 26.6–33)
MCHC RBC AUTO-ENTMCNC: 32.9 G/DL (ref 31.5–35.7)
MCV RBC AUTO: 86.6 FL (ref 79–97)
MONOCYTES # BLD AUTO: 0.42 10*3/MM3 (ref 0.1–0.9)
MONOCYTES NFR BLD AUTO: 6.5 % (ref 5–12)
NEUTROPHILS NFR BLD AUTO: 4.7 10*3/MM3 (ref 1.7–7)
NEUTROPHILS NFR BLD AUTO: 73 % (ref 42.7–76)
NRBC BLD AUTO-RTO: 0 /100 WBC (ref 0–0.2)
PLATELET # BLD AUTO: 210 10*3/MM3 (ref 140–450)
PMV BLD AUTO: 11.5 FL (ref 6–12)
POTASSIUM SERPL-SCNC: 4.1 MMOL/L (ref 3.5–5.2)
PROT SERPL-MCNC: 7.5 G/DL (ref 6–8.5)
RBC # BLD AUTO: 4.25 10*6/MM3 (ref 4.14–5.8)
SODIUM SERPL-SCNC: 138 MMOL/L (ref 136–145)
VANCOMYCIN TROUGH SERPL-MCNC: 8.9 MCG/ML (ref 5–20)
WBC # BLD AUTO: 6.44 10*3/MM3 (ref 3.4–10.8)

## 2021-09-15 PROCEDURE — 86140 C-REACTIVE PROTEIN: CPT | Performed by: INTERNAL MEDICINE

## 2021-09-15 PROCEDURE — 99214 OFFICE O/P EST MOD 30 MIN: CPT | Performed by: NURSE PRACTITIONER

## 2021-09-15 PROCEDURE — 80202 ASSAY OF VANCOMYCIN: CPT | Performed by: INTERNAL MEDICINE

## 2021-09-15 PROCEDURE — 85025 COMPLETE CBC W/AUTO DIFF WBC: CPT | Performed by: INTERNAL MEDICINE

## 2021-09-15 PROCEDURE — G0299 HHS/HOSPICE OF RN EA 15 MIN: HCPCS

## 2021-09-15 PROCEDURE — 80053 COMPREHEN METABOLIC PANEL: CPT | Performed by: INTERNAL MEDICINE
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
## 2021-09-15 NOTE — HOME HEALTH
SN visit for blood draw. Pt states he is very dizzy this am and had a headache last night. States it is so bad he would not be able to drive today. He states after this visit, his wife is taking him to neurology appt at [facility].

## 2021-09-17 ENCOUNTER — HOME CARE VISIT (OUTPATIENT)
Dept: HOME HEALTH SERVICES | Facility: HOME HEALTHCARE | Age: 56
End: 2021-09-17

## 2021-09-17 ENCOUNTER — HOME CARE VISIT (OUTPATIENT)
Dept: HOME HEALTH SERVICES | Facility: CLINIC | Age: 56
End: 2021-09-17

## 2021-09-17 VITALS
HEART RATE: 78 BPM | OXYGEN SATURATION: 98 % | RESPIRATION RATE: 18 BRPM | TEMPERATURE: 97.2 F | DIASTOLIC BLOOD PRESSURE: 72 MMHG | SYSTOLIC BLOOD PRESSURE: 118 MMHG

## 2021-09-17 PROCEDURE — G0299 HHS/HOSPICE OF RN EA 15 MIN: HCPCS

## 2021-09-17 NOTE — HOME HEALTH
PRN sn visit made for pt complaints of bleeding from insertion site. Upon arrival small amount of fresh blood noted to statlock. Dressing changed per sterile technique and moved stat lock to different position. Appeared the catheter had been irritating insertion site. No redness or signs of infection noted. Pt is very aware of site and s/sx to report.

## 2021-09-20 ENCOUNTER — HOME CARE VISIT (OUTPATIENT)
Dept: HOME HEALTH SERVICES | Facility: CLINIC | Age: 56
End: 2021-09-20

## 2021-09-20 ENCOUNTER — LAB REQUISITION (OUTPATIENT)
Dept: LAB | Facility: HOSPITAL | Age: 56
End: 2021-09-20

## 2021-09-20 VITALS
SYSTOLIC BLOOD PRESSURE: 111 MMHG | DIASTOLIC BLOOD PRESSURE: 64 MMHG | TEMPERATURE: 98.3 F | OXYGEN SATURATION: 98 % | RESPIRATION RATE: 20 BRPM | HEART RATE: 81 BPM

## 2021-09-20 DIAGNOSIS — Z00.00 ENCOUNTER FOR GENERAL ADULT MEDICAL EXAMINATION WITHOUT ABNORMAL FINDINGS: ICD-10-CM

## 2021-09-20 LAB
ALBUMIN SERPL-MCNC: 3.8 G/DL (ref 3.5–5.2)
ALBUMIN/GLOB SERPL: 1 G/DL
ALP SERPL-CCNC: 106 U/L (ref 39–117)
ALT SERPL W P-5'-P-CCNC: 24 U/L (ref 1–41)
ANION GAP SERPL CALCULATED.3IONS-SCNC: 8 MMOL/L (ref 5–15)
AST SERPL-CCNC: 21 U/L (ref 1–40)
BILIRUB SERPL-MCNC: 0.3 MG/DL (ref 0–1.2)
BUN SERPL-MCNC: 22 MG/DL (ref 6–20)
BUN/CREAT SERPL: 15.7 (ref 7–25)
CALCIUM SPEC-SCNC: 9.4 MG/DL (ref 8.6–10.5)
CHLORIDE SERPL-SCNC: 104 MMOL/L (ref 98–107)
CO2 SERPL-SCNC: 26 MMOL/L (ref 22–29)
CREAT SERPL-MCNC: 1.4 MG/DL (ref 0.76–1.27)
CRP SERPL-MCNC: 1.86 MG/DL (ref 0–0.5)
DEPRECATED RDW RBC AUTO: 42.5 FL (ref 37–54)
ERYTHROCYTE [DISTWIDTH] IN BLOOD BY AUTOMATED COUNT: 13.6 % (ref 12.3–15.4)
GFR SERPL CREATININE-BSD FRML MDRD: 52 ML/MIN/1.73
GLOBULIN UR ELPH-MCNC: 3.7 GM/DL
GLUCOSE SERPL-MCNC: 95 MG/DL (ref 65–99)
HCT VFR BLD AUTO: 33.6 % (ref 37.5–51)
HGB BLD-MCNC: 11.3 G/DL (ref 13–17.7)
MCH RBC QN AUTO: 28.5 PG (ref 26.6–33)
MCHC RBC AUTO-ENTMCNC: 33.6 G/DL (ref 31.5–35.7)
MCV RBC AUTO: 84.6 FL (ref 79–97)
PLATELET # BLD AUTO: 238 10*3/MM3 (ref 140–450)
PMV BLD AUTO: 11.1 FL (ref 6–12)
POTASSIUM SERPL-SCNC: 4.4 MMOL/L (ref 3.5–5.2)
PROT SERPL-MCNC: 7.5 G/DL (ref 6–8.5)
RBC # BLD AUTO: 3.97 10*6/MM3 (ref 4.14–5.8)
SODIUM SERPL-SCNC: 138 MMOL/L (ref 136–145)
VANCOMYCIN TROUGH SERPL-MCNC: 9.1 MCG/ML (ref 5–20)
WBC # BLD AUTO: 5.81 10*3/MM3 (ref 3.4–10.8)

## 2021-09-20 PROCEDURE — 85027 COMPLETE CBC AUTOMATED: CPT | Performed by: INTERNAL MEDICINE

## 2021-09-20 PROCEDURE — 80202 ASSAY OF VANCOMYCIN: CPT | Performed by: INTERNAL MEDICINE

## 2021-09-20 PROCEDURE — G0299 HHS/HOSPICE OF RN EA 15 MIN: HCPCS

## 2021-09-20 PROCEDURE — 86140 C-REACTIVE PROTEIN: CPT | Performed by: INTERNAL MEDICINE

## 2021-09-20 PROCEDURE — 80053 COMPREHEN METABOLIC PANEL: CPT | Performed by: INTERNAL MEDICINE

## 2021-09-20 NOTE — HOME HEALTH
SN routine home care visit and labs. Pt alert and oriented. Denies complaints at this visit. Venipuncture to left ac per 23 gauge butterfly x 1 attempt. Red, green and purple top tubes collected and labeled in the presence of patient. Tolerated well. Specimens to go to [facility] for resulting.

## 2021-09-27 ENCOUNTER — LAB REQUISITION (OUTPATIENT)
Dept: LAB | Facility: HOSPITAL | Age: 56
End: 2021-09-27

## 2021-09-27 ENCOUNTER — HOME CARE VISIT (OUTPATIENT)
Dept: HOME HEALTH SERVICES | Facility: CLINIC | Age: 56
End: 2021-09-27

## 2021-09-27 VITALS
DIASTOLIC BLOOD PRESSURE: 70 MMHG | SYSTOLIC BLOOD PRESSURE: 144 MMHG | TEMPERATURE: 98.4 F | HEART RATE: 86 BPM | OXYGEN SATURATION: 98 % | RESPIRATION RATE: 20 BRPM

## 2021-09-27 DIAGNOSIS — Z00.00 ENCOUNTER FOR GENERAL ADULT MEDICAL EXAMINATION WITHOUT ABNORMAL FINDINGS: ICD-10-CM

## 2021-09-27 LAB
ALBUMIN SERPL-MCNC: 3.8 G/DL (ref 3.5–5.2)
ALBUMIN/GLOB SERPL: 1.2 G/DL
ALP SERPL-CCNC: 109 U/L (ref 39–117)
ALT SERPL W P-5'-P-CCNC: 26 U/L (ref 1–41)
ANION GAP SERPL CALCULATED.3IONS-SCNC: 8 MMOL/L (ref 5–15)
AST SERPL-CCNC: 23 U/L (ref 1–40)
BILIRUB SERPL-MCNC: 0.3 MG/DL (ref 0–1.2)
BUN SERPL-MCNC: 21 MG/DL (ref 6–20)
BUN/CREAT SERPL: 12.3 (ref 7–25)
CALCIUM SPEC-SCNC: 9 MG/DL (ref 8.6–10.5)
CHLORIDE SERPL-SCNC: 104 MMOL/L (ref 98–107)
CO2 SERPL-SCNC: 26 MMOL/L (ref 22–29)
CREAT SERPL-MCNC: 1.71 MG/DL (ref 0.76–1.27)
CRP SERPL-MCNC: 0.83 MG/DL (ref 0–0.5)
DEPRECATED RDW RBC AUTO: 44 FL (ref 37–54)
ERYTHROCYTE [DISTWIDTH] IN BLOOD BY AUTOMATED COUNT: 13.7 % (ref 12.3–15.4)
GFR SERPL CREATININE-BSD FRML MDRD: 41 ML/MIN/1.73
GLOBULIN UR ELPH-MCNC: 3.2 GM/DL
GLUCOSE SERPL-MCNC: 115 MG/DL (ref 65–99)
HCT VFR BLD AUTO: 34 % (ref 37.5–51)
HGB BLD-MCNC: 11.1 G/DL (ref 13–17.7)
MCH RBC QN AUTO: 28.2 PG (ref 26.6–33)
MCHC RBC AUTO-ENTMCNC: 32.6 G/DL (ref 31.5–35.7)
MCV RBC AUTO: 86.3 FL (ref 79–97)
PLATELET # BLD AUTO: 264 10*3/MM3 (ref 140–450)
PMV BLD AUTO: 11.4 FL (ref 6–12)
POTASSIUM SERPL-SCNC: 4.3 MMOL/L (ref 3.5–5.2)
PROT SERPL-MCNC: 7 G/DL (ref 6–8.5)
RBC # BLD AUTO: 3.94 10*6/MM3 (ref 4.14–5.8)
SODIUM SERPL-SCNC: 138 MMOL/L (ref 136–145)
VANCOMYCIN TROUGH SERPL-MCNC: 9.6 MCG/ML (ref 5–20)
WBC # BLD AUTO: 5.41 10*3/MM3 (ref 3.4–10.8)

## 2021-09-27 PROCEDURE — G0299 HHS/HOSPICE OF RN EA 15 MIN: HCPCS

## 2021-09-27 PROCEDURE — 80202 ASSAY OF VANCOMYCIN: CPT | Performed by: INTERNAL MEDICINE

## 2021-09-27 PROCEDURE — 80053 COMPREHEN METABOLIC PANEL: CPT | Performed by: INTERNAL MEDICINE

## 2021-09-27 PROCEDURE — 86140 C-REACTIVE PROTEIN: CPT | Performed by: INTERNAL MEDICINE

## 2021-09-27 PROCEDURE — 85027 COMPLETE CBC AUTOMATED: CPT | Performed by: INTERNAL MEDICINE

## 2021-09-27 NOTE — HOME HEALTH
SN visit for routine blood draw and PICC line dressing change. Pt is taking 1.26 grams of vanco every 24 hours per PICC line to right upper extremity. Pt sees  again on Sept 30, 2021.  He reports he continues to have headaches on a daily basis. Reports he is tolerating Vanc well.

## 2021-10-04 ENCOUNTER — LAB REQUISITION (OUTPATIENT)
Dept: LAB | Facility: HOSPITAL | Age: 56
End: 2021-10-04

## 2021-10-04 ENCOUNTER — HOME CARE VISIT (OUTPATIENT)
Dept: HOME HEALTH SERVICES | Facility: CLINIC | Age: 56
End: 2021-10-04

## 2021-10-04 DIAGNOSIS — Z00.00 ENCOUNTER FOR GENERAL ADULT MEDICAL EXAMINATION WITHOUT ABNORMAL FINDINGS: ICD-10-CM

## 2021-10-04 LAB
ALBUMIN SERPL-MCNC: 4 G/DL (ref 3.5–5.2)
ALBUMIN/GLOB SERPL: 1 G/DL
ALP SERPL-CCNC: 115 U/L (ref 39–117)
ALT SERPL W P-5'-P-CCNC: 19 U/L (ref 1–41)
ANION GAP SERPL CALCULATED.3IONS-SCNC: 8 MMOL/L (ref 5–15)
AST SERPL-CCNC: 22 U/L (ref 1–40)
BASOPHILS # BLD AUTO: 0.01 10*3/MM3 (ref 0–0.2)
BASOPHILS NFR BLD AUTO: 0.2 % (ref 0–1.5)
BILIRUB SERPL-MCNC: 0.5 MG/DL (ref 0–1.2)
BUN SERPL-MCNC: 19 MG/DL (ref 6–20)
BUN/CREAT SERPL: 14.8 (ref 7–25)
CALCIUM SPEC-SCNC: 9.5 MG/DL (ref 8.6–10.5)
CHLORIDE SERPL-SCNC: 103 MMOL/L (ref 98–107)
CO2 SERPL-SCNC: 26 MMOL/L (ref 22–29)
CREAT SERPL-MCNC: 1.28 MG/DL (ref 0.76–1.27)
CRP SERPL-MCNC: 0.73 MG/DL (ref 0–0.5)
DEPRECATED RDW RBC AUTO: 42.5 FL (ref 37–54)
EOSINOPHIL # BLD AUTO: 0.41 10*3/MM3 (ref 0–0.4)
EOSINOPHIL NFR BLD AUTO: 7.7 % (ref 0.3–6.2)
ERYTHROCYTE [DISTWIDTH] IN BLOOD BY AUTOMATED COUNT: 13.7 % (ref 12.3–15.4)
GFR SERPL CREATININE-BSD FRML MDRD: 58 ML/MIN/1.73
GLOBULIN UR ELPH-MCNC: 3.9 GM/DL
GLUCOSE SERPL-MCNC: 103 MG/DL (ref 65–99)
HCT VFR BLD AUTO: 36.7 % (ref 37.5–51)
HGB BLD-MCNC: 12.2 G/DL (ref 13–17.7)
IMM GRANULOCYTES # BLD AUTO: 0.01 10*3/MM3 (ref 0–0.05)
IMM GRANULOCYTES NFR BLD AUTO: 0.2 % (ref 0–0.5)
LYMPHOCYTES # BLD AUTO: 1.18 10*3/MM3 (ref 0.7–3.1)
LYMPHOCYTES NFR BLD AUTO: 22.2 % (ref 19.6–45.3)
MCH RBC QN AUTO: 28.4 PG (ref 26.6–33)
MCHC RBC AUTO-ENTMCNC: 33.2 G/DL (ref 31.5–35.7)
MCV RBC AUTO: 85.3 FL (ref 79–97)
MONOCYTES # BLD AUTO: 0.53 10*3/MM3 (ref 0.1–0.9)
MONOCYTES NFR BLD AUTO: 10 % (ref 5–12)
NEUTROPHILS NFR BLD AUTO: 3.18 10*3/MM3 (ref 1.7–7)
NEUTROPHILS NFR BLD AUTO: 59.7 % (ref 42.7–76)
NRBC BLD AUTO-RTO: 0 /100 WBC (ref 0–0.2)
PLATELET # BLD AUTO: 265 10*3/MM3 (ref 140–450)
PMV BLD AUTO: 11.8 FL (ref 6–12)
POTASSIUM SERPL-SCNC: 4.4 MMOL/L (ref 3.5–5.2)
PROT SERPL-MCNC: 7.9 G/DL (ref 6–8.5)
RBC # BLD AUTO: 4.3 10*6/MM3 (ref 4.14–5.8)
SODIUM SERPL-SCNC: 137 MMOL/L (ref 136–145)
WBC # BLD AUTO: 5.32 10*3/MM3 (ref 3.4–10.8)

## 2021-10-04 PROCEDURE — 80053 COMPREHEN METABOLIC PANEL: CPT | Performed by: INTERNAL MEDICINE

## 2021-10-04 PROCEDURE — 85025 COMPLETE CBC W/AUTO DIFF WBC: CPT | Performed by: INTERNAL MEDICINE

## 2021-10-04 PROCEDURE — G0299 HHS/HOSPICE OF RN EA 15 MIN: HCPCS

## 2021-10-04 PROCEDURE — 86140 C-REACTIVE PROTEIN: CPT | Performed by: INTERNAL MEDICINE

## 2021-10-04 RX ORDER — ATORVASTATIN CALCIUM 40 MG/1
40 TABLET, FILM COATED ORAL DAILY
Start: 2021-10-04 | End: 2021-10-06 | Stop reason: SDUPTHER

## 2021-10-04 NOTE — TELEPHONE ENCOUNTER
Caller: Robert Piedra    Relationship: Self      Medication requested (name and dosage):     atorvastatin (LIPITOR) 40 MG tablet    Pharmacy where request should be sent:     [facility]/pharmacy #[id number] - [place], [place] - [address] AT ACROSS FROM [address]  [phone] [facility] [phone]        Additional details provided by patient:    RAISING FROM 20 MG TO 40 MG    Best call back number:    [phone]    Does the patient have less than a 3 day supply:  [x] Yes  [] No    Tanya Santoyo Rep   10/04/21 10:10 CDT

## 2021-10-05 VITALS
TEMPERATURE: 97.8 F | SYSTOLIC BLOOD PRESSURE: 110 MMHG | OXYGEN SATURATION: 98 % | DIASTOLIC BLOOD PRESSURE: 72 MMHG | HEART RATE: 105 BPM | RESPIRATION RATE: 16 BRPM

## 2021-10-05 NOTE — HOME HEALTH
SN lore CBC, CMP, and CRP via PICC line. PICC line discontinued. Labs taken to [facility]. EDISON Martinez notified

## 2021-10-06 RX ORDER — ATORVASTATIN CALCIUM 40 MG/1
40 TABLET, FILM COATED ORAL DAILY
Qty: 90 TABLET | Refills: 1 | Status: SHIPPED | OUTPATIENT
Start: 2021-10-06 | End: 2022-03-07

## 2021-10-08 ENCOUNTER — OFFICE VISIT (OUTPATIENT)
Dept: CARDIOLOGY | Facility: CLINIC | Age: 56
End: 2021-10-08

## 2021-10-08 VITALS
HEIGHT: 77 IN | HEART RATE: 86 BPM | SYSTOLIC BLOOD PRESSURE: 121 MMHG | BODY MASS INDEX: 25.86 KG/M2 | OXYGEN SATURATION: 99 % | DIASTOLIC BLOOD PRESSURE: 79 MMHG | WEIGHT: 219 LBS

## 2021-10-08 DIAGNOSIS — I48.19 PERSISTENT ATRIAL FIBRILLATION (HCC): ICD-10-CM

## 2021-10-08 DIAGNOSIS — I50.22 CHRONIC SYSTOLIC CONGESTIVE HEART FAILURE (HCC): Primary | ICD-10-CM

## 2021-10-08 DIAGNOSIS — Z98.890 S/P MVR (MITRAL VALVE REPAIR): ICD-10-CM

## 2021-10-08 DIAGNOSIS — I82.4Z1 DEEP VEIN THROMBOSIS (DVT) OF DISTAL VEIN OF RIGHT LOWER EXTREMITY, UNSPECIFIED CHRONICITY (HCC): ICD-10-CM

## 2021-10-08 DIAGNOSIS — I49.3 PVC (PREMATURE VENTRICULAR CONTRACTION): ICD-10-CM

## 2021-10-08 DIAGNOSIS — I25.10 CORONARY ARTERY DISEASE INVOLVING NATIVE CORONARY ARTERY OF NATIVE HEART WITHOUT ANGINA PECTORIS: ICD-10-CM

## 2021-10-08 DIAGNOSIS — I05.9 ENDOCARDITIS OF MITRAL VALVE: ICD-10-CM

## 2021-10-08 PROCEDURE — 93000 ELECTROCARDIOGRAM COMPLETE: CPT | Performed by: NURSE PRACTITIONER

## 2021-10-08 PROCEDURE — 99214 OFFICE O/P EST MOD 30 MIN: CPT | Performed by: NURSE PRACTITIONER

## 2021-10-11 ENCOUNTER — TRANSCRIBE ORDERS (OUTPATIENT)
Dept: ADMINISTRATIVE | Facility: HOSPITAL | Age: 56
End: 2021-10-11

## 2021-10-11 ENCOUNTER — LAB (OUTPATIENT)
Dept: LAB | Facility: HOSPITAL | Age: 56
End: 2021-10-11

## 2021-10-11 DIAGNOSIS — R78.81 BACTEREMIA: ICD-10-CM

## 2021-10-11 DIAGNOSIS — B95.2 ENTEROCOCCUS FAECALIS INFECTION: ICD-10-CM

## 2021-10-11 DIAGNOSIS — I33.0 ACUTE AND SUBACUTE BACTERIAL ENDOCARDITIS: Primary | ICD-10-CM

## 2021-10-11 DIAGNOSIS — N28.9 URETERAL SLUDGE: ICD-10-CM

## 2021-10-11 LAB
ALBUMIN SERPL-MCNC: 4 G/DL (ref 3.5–5.2)
ALBUMIN/GLOB SERPL: 1.1 G/DL
ALP SERPL-CCNC: 105 U/L (ref 39–117)
ALT SERPL W P-5'-P-CCNC: 21 U/L (ref 1–41)
ANION GAP SERPL CALCULATED.3IONS-SCNC: 10 MMOL/L (ref 5–15)
AST SERPL-CCNC: 24 U/L (ref 1–40)
BASOPHILS # BLD AUTO: 0.01 10*3/MM3 (ref 0–0.2)
BASOPHILS NFR BLD AUTO: 0.2 % (ref 0–1.5)
BILIRUB SERPL-MCNC: 0.6 MG/DL (ref 0–1.2)
BUN SERPL-MCNC: 17 MG/DL (ref 6–20)
BUN/CREAT SERPL: 13.1 (ref 7–25)
CALCIUM SPEC-SCNC: 9.3 MG/DL (ref 8.6–10.5)
CHLORIDE SERPL-SCNC: 104 MMOL/L (ref 98–107)
CO2 SERPL-SCNC: 24 MMOL/L (ref 22–29)
CREAT SERPL-MCNC: 1.3 MG/DL (ref 0.76–1.27)
CRP SERPL-MCNC: 0.38 MG/DL (ref 0–0.5)
DEPRECATED RDW RBC AUTO: 42.7 FL (ref 37–54)
EOSINOPHIL # BLD AUTO: 0.34 10*3/MM3 (ref 0–0.4)
EOSINOPHIL NFR BLD AUTO: 7.4 % (ref 0.3–6.2)
ERYTHROCYTE [DISTWIDTH] IN BLOOD BY AUTOMATED COUNT: 13.6 % (ref 12.3–15.4)
GFR SERPL CREATININE-BSD FRML MDRD: 57 ML/MIN/1.73
GLOBULIN UR ELPH-MCNC: 3.6 GM/DL
GLUCOSE SERPL-MCNC: 108 MG/DL (ref 65–99)
HCT VFR BLD AUTO: 36.6 % (ref 37.5–51)
HGB BLD-MCNC: 12.1 G/DL (ref 13–17.7)
IMM GRANULOCYTES # BLD AUTO: 0.01 10*3/MM3 (ref 0–0.05)
IMM GRANULOCYTES NFR BLD AUTO: 0.2 % (ref 0–0.5)
LYMPHOCYTES # BLD AUTO: 1.2 10*3/MM3 (ref 0.7–3.1)
LYMPHOCYTES NFR BLD AUTO: 26.3 % (ref 19.6–45.3)
MCH RBC QN AUTO: 27.9 PG (ref 26.6–33)
MCHC RBC AUTO-ENTMCNC: 33.1 G/DL (ref 31.5–35.7)
MCV RBC AUTO: 84.5 FL (ref 79–97)
MONOCYTES # BLD AUTO: 0.38 10*3/MM3 (ref 0.1–0.9)
MONOCYTES NFR BLD AUTO: 8.3 % (ref 5–12)
NEUTROPHILS NFR BLD AUTO: 2.63 10*3/MM3 (ref 1.7–7)
NEUTROPHILS NFR BLD AUTO: 57.6 % (ref 42.7–76)
NRBC BLD AUTO-RTO: 0 /100 WBC (ref 0–0.2)
PLATELET # BLD AUTO: 230 10*3/MM3 (ref 140–450)
PMV BLD AUTO: 10.9 FL (ref 6–12)
POTASSIUM SERPL-SCNC: 3.9 MMOL/L (ref 3.5–5.2)
PROT SERPL-MCNC: 7.6 G/DL (ref 6–8.5)
RBC # BLD AUTO: 4.33 10*6/MM3 (ref 4.14–5.8)
SODIUM SERPL-SCNC: 138 MMOL/L (ref 136–145)
WBC # BLD AUTO: 4.57 10*3/MM3 (ref 3.4–10.8)

## 2021-10-11 PROCEDURE — 80053 COMPREHEN METABOLIC PANEL: CPT | Performed by: INTERNAL MEDICINE

## 2021-10-11 PROCEDURE — 86140 C-REACTIVE PROTEIN: CPT | Performed by: INTERNAL MEDICINE

## 2021-10-11 PROCEDURE — 36415 COLL VENOUS BLD VENIPUNCTURE: CPT | Performed by: INTERNAL MEDICINE

## 2021-10-11 PROCEDURE — 85025 COMPLETE CBC W/AUTO DIFF WBC: CPT | Performed by: INTERNAL MEDICINE

## 2021-10-11 PROCEDURE — 87040 BLOOD CULTURE FOR BACTERIA: CPT | Performed by: INTERNAL MEDICINE

## 2021-10-11 NOTE — PROGRESS NOTES
Subjective:     Encounter Date:10/08/2021      Patient ID: Robert Piedra is a 56 y.o. male.    Chief Complaint: Follow-up bacterial endocarditis, CAD, chronic systolic CHF, atrial fibrillation and flutter, history of severe MR status post mini MVR    History of Present Illness     The patient presents to follow-up regarding his recent bacterial endocarditis of the mitral valve diagnosed August 2021 and treated at [facility], leading to transfer to [place], with continued outpatient treatment following discharge there,  per ID, Dr. Rock, with IV antibiotics that were completed on 10/3.  He is also followed extensively for his history of chronic systolic congestive heart failure/nonischemic cardiomyopathy -possibly tachycardia mediated in the setting of his atrial fibrillation, history of severe mitral valve regurgitation status post mini MVr, left atrial appendage ligation, and maze procedure per Dr. Richardson on 8/19/2020, atrial fibrillation diagnosed in 2017, with ablation for AF in October 2019, and atrial flutter and PVC ablations performed in March 2021, all followed at [place].    Of note, due to leak around the left atrial appendage, anticoagulation was continued after left atrial appendage ligation.    He does also have a loop recorder in place implanted by Dr. Prieto ()  in August 2019.    Additionally, he did follow with [facility] cardiology for period of time and received 3 drug-eluting stents to the RCA on 6/3/2021 following an isolated episode of exertional chest pain that led to a nuclear stress test which was reported as abnormal.  Prior to MVr there was no documentation that he had any obstructive coronary artery disease.    He presented to [facility] on 8/10/2021 with complaints of altered speech that did resolve and recent intermittent fevers.  Cardiology was initially consulted for elevated troponin.  However, this was found to be flat trending and not consistent with ACS.  He denied  chest pain.  Further evaluation/work-up revealed the patient had multiple embolic strokes, splenic infarcts, right lower extremity DVT, and mitral valve vegetation/enterococcal bacterial endocarditis.  The patient was followed closely and treated  by infectious disease, neurology, and cardiology.  Due to risk of hemorrhagic conversion, neurology did recommend holding anticoagulation with Eliquis.  He initially came in on Plavix and Eliquis given the fact that he had had some recurrent atrial fibrillation after his previous ablation.  It appears he maintained normal sinus rhythm throughout that admission.  Eliquis was held.  Plavix and aspirin were initially continued due to his drug-eluting stent placement x3 only 2 months prior.  See echo report from that admission below.  Ultimately, the patient was transferred to [place] under the care of CT surgery, Dr. Richardson on 8/20/2021.    The patient was hospitalized at [place] 8/20 through 8/26/2021.  Repeat TTE on 8/21/2021 reported mild MR and no evidence of vegetation.  Infectious disease did recommend continued treatment with vancomycin through 10/3 and gentamicin through 9/5.  He was discharged home in stable condition.  Of note, due to the risk of bleeding on anticoagulation in the setting of his embolic strokes and presence of a right lower extremity DVT, an IVC filter was placed on 8/25/2021 with plans for extraction in 3 to 6 months thereafter.  It appears the patient was discharged home from [place] on aspirin 81 mg daily, and not on Plavix or Eliquis.    The patient later saw neurology in clinic at [facility] on 9/15 and Eliquis was resumed.  Also of note, the patient had been in the ER 9/9 with complaints of headache and dizziness.  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    Today the patient reports he is feeling fair overall.  He reports significant dizziness with position  changes.  Reports shortness of breath with exertion, possibly worsened slightly after antibiotic treatment was completed.  He reports intermittent hypotension with systolic blood pressures in the 90s.  He has occasional chest pain at rest after eating.  He denies any exertional chest pain.  He denies palpitations, syncope, orthopnea, PND, edema.  He is following up with Dr. Richardson with a repeat TTE early next week on 10/12.  When questioned further about his history, he says that he previously was prescribed Entresto but he did not tolerate this due to elevated creatinine/acute kidney injury.  He also reports that Dr. Prieto has offered him an AICD multiple times in the past but he declines and continues to decline because that would mean [place] would no longer [place]  We discussed the fact that his echo in August at [place] revealed his LVEF had actually normalized and the patient states he finds that hard to believe and feels as though it is still low.    The following portions of the patient's history were reviewed and updated as appropriate: allergies, current medications, past family history, past medical history, past social history, past surgical history and problem list.    Review of Systems   Constitutional: Positive for malaise/fatigue.   Cardiovascular: Positive for chest pain (atypical ) and dyspnea on exertion. Negative for claudication, leg swelling, near-syncope, orthopnea, palpitations, paroxysmal nocturnal dyspnea and syncope.   Respiratory: Negative for cough.    Hematologic/Lymphatic: Does not bruise/bleed easily.   Musculoskeletal: Negative for falls.   Gastrointestinal: Negative for bloating.   Neurological: Positive for dizziness. Negative for light-headedness and weakness.       Allergies   Allergen Reactions   • Penicillins Hives       Current Outpatient Medications:   •  acetaminophen (Tylenol) 325 MG tablet, Take 650 mg by mouth Every 4 (Four) Hours As Needed., Disp: ,  "Rfl:   •  apixaban (ELIQUIS) 5 MG tablet tablet, Take 1 tablet by mouth Every 12 (Twelve) Hours., Disp: 60 tablet, Rfl: 5  •  aspirin 81 MG EC tablet, Take 1 tablet by mouth Daily., Disp: , Rfl:   •  atorvastatin (LIPITOR) 40 MG tablet, Take 1 tablet by mouth Daily., Disp: 90 tablet, Rfl: 1  •  Coenzyme Q10 (CO Q 10) 100 MG capsule, Take 300 mg by mouth., Disp: , Rfl:   •  docusate sodium (Colace) 100 MG capsule, Take 100 mg by mouth As Needed., Disp: , Rfl:   •  ferrous sulfate 325 (65 FE) MG tablet, Take 325 mg by mouth 2 (Two) Times a Day., Disp: , Rfl:   •  furosemide (LASIX) 40 MG tablet, Take 40 mg by mouth Daily., Disp: , Rfl:   •  losartan (COZAAR) 25 MG tablet, Take 0.5 tablets by mouth Daily., Disp: , Rfl:   •  metoprolol tartrate (LOPRESSOR) 25 MG tablet, Take 12.5 mg by mouth 2 (Two) Times a Day., Disp: , Rfl:   •  potassium chloride (K-DUR,KLOR-CON) 20 MEQ CR tablet, Take 20 mEq by mouth Daily., Disp: , Rfl:   •  tamsulosin (FLOMAX) 0.4 MG capsule 24 hr capsule, TAKE 1 CAPSULE BY MOUTH EVERY DAY, Disp: 90 capsule, Rfl: 3         Objective:    /79   Pulse 86   Ht 195.6 cm (77\")   Wt 99.3 kg (219 lb)   SpO2 99%   BMI 25.97 kg/m²        Vitals and nursing note reviewed.   Constitutional:       General: Not in acute distress.     Appearance: Well-developed and not in distress. Not diaphoretic.   Neck:      Vascular: No JVD.   Pulmonary:      Effort: Pulmonary effort is normal. No respiratory distress.      Breath sounds: Normal breath sounds.   Cardiovascular:      Normal rate. Regular rhythm.      Murmurs: There is no murmur.   Edema:     Peripheral edema absent.   Abdominal:      Tenderness: There is no abdominal tenderness.   Skin:     General: Skin is warm and dry.   Neurological:      Mental Status: Alert and oriented to person, place, and time.         Lab Review:   Lab Results   Component Value Date    GLUCOSE 108 (H) 10/11/2021    BUN 17 10/11/2021    CREATININE 1.30 (H) 10/11/2021    " EGFRIFNONA 57 (L) 10/11/2021    EGFRIFAFRI >59 08/02/2021    BCR 13.1 10/11/2021    K 3.9 10/11/2021    CO2 24.0 10/11/2021    CALCIUM 9.3 10/11/2021    ALBUMIN 4.00 10/11/2021    AST 24 10/11/2021    ALT 21 10/11/2021     Lab Results   Component Value Date    CHOL 140 08/10/2021    CHLPL 140 08/21/2021    TRIG 89 08/21/2021    HDL 28 (L) 08/21/2021    LDL 94 08/21/2021     Lab Results   Component Value Date    WBC 4.57 10/11/2021    HGB 12.1 (L) 10/11/2021    HCT 36.6 (L) 10/11/2021    MCV 84.5 10/11/2021     10/11/2021             ECG 12 Lead    Date/Time: 10/8/2021 3:37 PM  Performed by: Malgorzata Mora APRN  Authorized by: Malgorzata Mora APRN   Comparison: compared with previous ECG from 8/11/2021  Similar to previous ECG  Rhythm: sinus rhythm  BPM: 86  Conduction: non-specific intraventricular conduction delay            Results for orders placed during the hospital encounter of 08/10/21    Adult Transthoracic Echo Complete W/ Cont if Necessary Per Protocol    Interpretation Summary  · Left ventricular ejection fraction appears to be 31 - 35%. Left ventricular systolic function is moderately decreased.  · The left ventricular cavity is mildly dilated.  · Estimated right ventricular systolic pressure from tricuspid regurgitation is normal (<35 mmHg).  · Normal size and function of the right ventricle.  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  · Saline test results are negative.    8/21/2021 echo report from [facility]:    1. Moderately enlarged left ventricle with normal systolic function.   2. The mitral valve has undergone surgical repair with mild residual   regurgitation.   3. Compared to prior study of 12/16/2020, left ventricular systolic   function  "is improved on today's study. There is slightly more mitral valve   regurgitation through the repaired mitral valve today than on prior study.    Previous outside records as reviewed and outlined by Dr. Owusu: \" Relevant details learned through \"care everywhere\" search of [facility] records include the following:  -Catheterization performed by Dr. Howe 8/17/2020, reported 50% proximal circumflex stenosis, 60% distal RCA stenosis  -dc summary from surgery/postop recovery from Aug '21 included:  Synopsis:  55 year old man with severe MR,HTN, HLD, GERD, AFib, BPH, and anxiety underwent Mini MVr, Ligation L Atrial Appendage, and MAZE procedure on 8/19/20 with Dr. Richardson. Patient is a Bahai.    Hospital Course:  On 8/19/2020, the patient underwent a Mini MVr, Ligation L Atrial Appendage, and MAZE with Dr. Richardson. The intraoperatiave CARLOS reveals: LVEF 40-45% with mild RV dysfunction. Patient is a Bahai thus received no blood but did get some Cellsaver intraoperatively. APS did an ES block in ORPostoperatively, the patient was transferred to the CVICU, where they were warmed, stabilized, and extubated. On POD#2, the patient was hemodynamically stable, weaned from vasoactive drips, and was transferred to the stepdown unit     Of note, the discharge summary reports the patient's \"anticoagulation\" plan upon discharge was to be aspirin alone, and that he could stop taking his Eliquis that had previously been prescribed.     Other relevant past hx learned:  Robert Piedra is a 55 y.o. male with past medical history significant for persistent atrial fibrillation, mitral regurgitation, CHF, HTN, and HLD, seen for preoperative examination prior to MVR+MAZE on 8/19/20 with Dr. Richardson. Patient was diagnosed with atrial fibrillation in 2017, without follow up until he developed heart failure requiring hospitalization in July 2019. He was referred to EP (Dr. Prieto) and underwent CARLOS/DCCV and ILR implant on 8/29/19, " "was enrolled in AMAZE trial (randomized to receive AF ablation without LLAA), continued on anticoagulation (Eliquis), and started on antiarrhythmic therapy. He underwent RF PVI and CTI line on 10/22/2019. He was also referred to Dr. Marcelino for CHF management, with TTE on 2/12/20 revealing recovery of systolic function (LVEF 55-65%) compared to OSH echo findings in 7/2019 (LVEF 42%), in addition to prolapse and possible flail of the posterior mitral valve leaflet with severe, eccentric, anteriorly-directed MR. Patient was doing well overall until mid July of this year, at which time his afib recurred, and he developed the onset of exertional dyspnea, fatigue, and mild lower extremity edema. His underwent CARLOS on 8/5/2020 which revealed a mildly dilated LV with preserved systolic function, normal RV size and function, and severe, eccentric primary MR due to flail P2 leaflet, with anteriorly directed jet.     Operative report reviewed: Confirmed prolapse of the posterior leaflet with a flail leaflet due to a torn chordae tendon a.  Procedures performed included a triangular resection of P2, 38 mm CG Future band with for mitral valve repair, left atrial cryomaze with oversewing, and inversion of the left atrial appendage.  This is all accomplished via a limited right anterolateral thoracotomy\" - Dr. Owusu         Assessment:      Problem List Items Addressed This Visit        Cardiac and Vasculature    PVC (premature ventricular contraction)    Relevant Medications    metoprolol tartrate (LOPRESSOR) 25 MG tablet    Chronic systolic congestive heart failure (HCC) - Primary    Overview                Coronary artery disease    Overview                        Endocarditis of mitral valve    Overview                Persistent atrial fibrillation (HCC)                    S/P MVR (mitral valve repair)               Coag and Thromboembolic    DVT (deep venous thrombosis) ([facility])                  Plan:     1.  Chronic systolic " CHF/probable nonischemic cardiomyopathy: He is currently compensated/euvolemic on exam.  Per most recent echo report in August at [place] LVEF has actually normalized.  Additionally, he is due for repeat TTE at [place] in 4 days.  As noted above, the patient states he has been offered AICD multiple times per Dr. Prieto in the past, but he continues to decline because this would end [place].  Furthermore, now that LVEF has normalized this would not be indicated.  -He was discharged from [facility] in August on Toprol-XL, but it appears he has been transitioned back to Lopressor 12.5 mg twice daily following discharge from [place].  Continue beta-blocker.  -Continue low-dose ARB.  The patient reports intolerance to Entresto in the past in the form of increased creatinine/KEENA.  -Currently on Lasix 40 mg daily.  From my standpoint, at this point would be okay to take Lasix on an as-needed basis for dyspnea/orthopnea/PND, significant lower extremity edema, weight gain of greater than 2 pounds overnight or 5 pounds in 2 days.  - will await 2D echocardiogram next week prior to consideration of further medication changes.  Consider SGLT2 inhibitor next if LVEF below normal, as opposed to Aldactone or any blood pressure lowering medications given his reports of occasional dizziness and hypotension.  -Heart healthy low-sodium diet  -Daily weights    2.  Recent treatment for endocarditis of mitral valve: He completed IV antibiotics per ID, Dr. Castro on 10/3.  He is due for repeat echo on 10/12 at [place] with follow-up with CTS, Dr. Richardson, on the same day.    3.  Persistent atrial fibrillation: Maintaining normal sinus rhythm.  He denies palpitations.  See history above.  As of 9/15 he is back on anticoagulation with Eliquis.  Previous notes indicate incomplete closure of left atrial appendage, warranting continuation of anticoagulation.  Eliquis was held temporarily following embolic strokes felt  "to be secondary to his endocarditis, given concerns for risk of hemorrhagic conversion.    4.  Coronary artery disease: Stable, no angina.  He had 3 drug-eluting stents placed to the RCA on 6/30/2021 at [facility] (non-ACS indication).  His situation is unique and given his recent endocarditis, atrial fibrillation, strokes.  He is currently on aspirin 81 mg daily with Eliquis 5 mg twice daily and not on dual antiplatelet therapy.  Continue current regimen.  -Continue high intensity statin for goal LDL less than 70.    Follow-up with Dr. Owusu in 2 to 3 months, sooner with new or worsening symptoms.    Addendum: 10/12/2021 TTE at [facility]:    This result has an attachment that is not available.   1.  Normal left ventricular cavity size and wall thickness with low normal   ejection fraction estimated at 50-55%   2.  Normal right ventricular size and systolic function   3.  The repaired mitral valve has an elevated mean gradient of 6 mmHg at   76 bpm and at least moderate mitral regurgitation   4.  RVSP estimated at 30 mmHg   5.  Moderate left atrial enlargement     Since prior echocardiogram 8/21/2021 mitral regurgitation is more severe   and the mean gradient is elevated.    Note reviewed per Dr. Richardson on 10/12:     \"57yo s/p Mvrepair with concerns for endocarditis and CVA as a complication. Today looks well. Echo to my eye is without any active infection. There is mild MR (disagree with the report) and overall I am satisfied with his clinical status. Follow up prn.    Electronically signed by Rk Richardson MD at 10/12/2021 1:05 PM CDT \"    As his LVEF has normalized and he is maintaining NSR, will hold off on further changes in medical therapy at this time.         "

## 2021-10-15 ENCOUNTER — OFFICE VISIT (OUTPATIENT)
Dept: NEUROLOGY | Facility: CLINIC | Age: 56
End: 2021-10-15

## 2021-10-15 VITALS
RESPIRATION RATE: 17 BRPM | OXYGEN SATURATION: 99 % | DIASTOLIC BLOOD PRESSURE: 60 MMHG | HEART RATE: 90 BPM | HEIGHT: 77 IN | BODY MASS INDEX: 23.02 KG/M2 | WEIGHT: 195 LBS | SYSTOLIC BLOOD PRESSURE: 122 MMHG

## 2021-10-15 DIAGNOSIS — I10 ESSENTIAL HYPERTENSION: ICD-10-CM

## 2021-10-15 DIAGNOSIS — I63.40 CEREBROVASCULAR ACCIDENT (CVA) DUE TO EMBOLISM OF CEREBRAL ARTERY (HCC): Primary | ICD-10-CM

## 2021-10-15 DIAGNOSIS — E78.2 MIXED HYPERLIPIDEMIA: ICD-10-CM

## 2021-10-15 DIAGNOSIS — R42 DIZZINESS: ICD-10-CM

## 2021-10-15 PROCEDURE — 99214 OFFICE O/P EST MOD 30 MIN: CPT | Performed by: NURSE PRACTITIONER

## 2021-10-15 RX ORDER — ASPIRIN 81 MG/1
81 TABLET, CHEWABLE ORAL DAILY
COMMUNITY
Start: 2021-09-13 | End: 2022-12-07 | Stop reason: SDUPTHER

## 2021-10-15 RX ORDER — POTASSIUM CHLORIDE 1500 MG/1
20 TABLET, FILM COATED, EXTENDED RELEASE ORAL DAILY
COMMUNITY
Start: 2021-10-02 | End: 2021-11-05 | Stop reason: SDUPTHER
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
## 2021-10-16 LAB
BACTERIA SPEC AEROBE CULT: NORMAL
BACTERIA SPEC AEROBE CULT: NORMAL

## 2021-10-19 ENCOUNTER — TELEPHONE (OUTPATIENT)
Dept: NEUROSURGERY | Facility: CLINIC | Age: 56
End: 2021-10-19

## 2021-10-19 NOTE — TELEPHONE ENCOUNTER
REC REQUEST FOR RECORDS FROM [place] [employer] - I DON'T SEE THAT WE EVER REC A REQUEST FOR RECORDS.   HE IS NOT OUR PATIENT OR NEUROLOGY.   THE VENDOR NUM IS [id number].  [facility] TO RETURN MY CALL.

## 2021-10-29 ENCOUNTER — TRANSCRIBE ORDERS (OUTPATIENT)
Dept: ADMINISTRATIVE | Facility: HOSPITAL | Age: 56
End: 2021-10-29

## 2021-10-29 ENCOUNTER — LAB (OUTPATIENT)
Dept: LAB | Facility: HOSPITAL | Age: 56
End: 2021-10-29

## 2021-10-29 DIAGNOSIS — I33.0 ACUTE AND SUBACUTE BACTERIAL ENDOCARDITIS: Primary | ICD-10-CM

## 2021-10-29 DIAGNOSIS — Z53.1 REFUSAL OF TREATMENT FOR REASONS OF RELIGION OR CONSCIENCE: ICD-10-CM

## 2021-10-29 DIAGNOSIS — I50.20 HEART FAILURE WITH REDUCED LEFT VENTRICULAR FUNCTION (HCC): ICD-10-CM

## 2021-10-29 DIAGNOSIS — I63.40 CEREBRAL INFARCTION DUE TO EMBOLISM OF CEREBRAL ARTERY (HCC): ICD-10-CM

## 2021-10-29 LAB
CRP SERPL-MCNC: 0.64 MG/DL (ref 0–0.5)
DEPRECATED RDW RBC AUTO: 45.4 FL (ref 37–54)
ERYTHROCYTE [DISTWIDTH] IN BLOOD BY AUTOMATED COUNT: 14.1 % (ref 12.3–15.4)
HCT VFR BLD AUTO: 36.1 % (ref 37.5–51)
HGB BLD-MCNC: 11.8 G/DL (ref 13–17.7)
MCH RBC QN AUTO: 28.6 PG (ref 26.6–33)
MCHC RBC AUTO-ENTMCNC: 32.7 G/DL (ref 31.5–35.7)
MCV RBC AUTO: 87.4 FL (ref 79–97)
PLATELET # BLD AUTO: 275 10*3/MM3 (ref 140–450)
PMV BLD AUTO: 10.6 FL (ref 6–12)
RBC # BLD AUTO: 4.13 10*6/MM3 (ref 4.14–5.8)
WBC # BLD AUTO: 7.2 10*3/MM3 (ref 3.4–10.8)

## 2021-10-29 PROCEDURE — 36415 COLL VENOUS BLD VENIPUNCTURE: CPT | Performed by: INTERNAL MEDICINE

## 2021-10-29 PROCEDURE — 85027 COMPLETE CBC AUTOMATED: CPT | Performed by: INTERNAL MEDICINE

## 2021-10-29 PROCEDURE — 86140 C-REACTIVE PROTEIN: CPT | Performed by: INTERNAL MEDICINE

## 2021-10-29 PROCEDURE — 87040 BLOOD CULTURE FOR BACTERIA: CPT | Performed by: INTERNAL MEDICINE

## 2021-11-03 LAB
BACTERIA SPEC AEROBE CULT: NORMAL
BACTERIA SPEC AEROBE CULT: NORMAL

## 2021-11-05 ENCOUNTER — OFFICE VISIT (OUTPATIENT)
Dept: CARDIOLOGY | Facility: CLINIC | Age: 56
End: 2021-11-05

## 2021-11-05 VITALS
OXYGEN SATURATION: 99 % | HEIGHT: 77 IN | HEART RATE: 75 BPM | SYSTOLIC BLOOD PRESSURE: 119 MMHG | BODY MASS INDEX: 25.98 KG/M2 | DIASTOLIC BLOOD PRESSURE: 78 MMHG | WEIGHT: 220 LBS

## 2021-11-05 DIAGNOSIS — I10 ESSENTIAL HYPERTENSION: ICD-10-CM

## 2021-11-05 DIAGNOSIS — Z98.890 S/P MVR (MITRAL VALVE REPAIR): ICD-10-CM

## 2021-11-05 DIAGNOSIS — I48.19 PERSISTENT ATRIAL FIBRILLATION (HCC): ICD-10-CM

## 2021-11-05 DIAGNOSIS — I50.22 CHRONIC SYSTOLIC CONGESTIVE HEART FAILURE (HCC): Primary | ICD-10-CM

## 2021-11-05 DIAGNOSIS — I25.10 CORONARY ARTERY DISEASE INVOLVING NATIVE CORONARY ARTERY OF NATIVE HEART WITHOUT ANGINA PECTORIS: ICD-10-CM

## 2021-11-05 PROCEDURE — 93000 ELECTROCARDIOGRAM COMPLETE: CPT | Performed by: INTERNAL MEDICINE

## 2021-11-05 PROCEDURE — 99214 OFFICE O/P EST MOD 30 MIN: CPT | Performed by: INTERNAL MEDICINE

## 2021-11-05 NOTE — PROGRESS NOTES
Subjective:     Encounter Date:11/05/21      Patient ID: Robert Piedra is a 56 y.o. male.    Chief Complaint: Follow-up bacterial endocarditis, CAD, chronic systolic CHF, atrial fibrillation and flutter, history of severe MR status post mini MVR    History of Present Illness     56-year-old male returns today for follow-up.  By way of review, we met when he presented to [facility] on 8/10/2021 with complaints of altered speech that did resolve and recent intermittent fevers.   Further evaluation/work-up revealed the patient had multiple embolic strokes, splenic infarcts, right lower extremity DVT, and concern for mitral valve vegetation/enterococcal bacterial endocarditis based on TTE.  Cardiology was initially consulted for elevated troponin.  However, this was found to be flat trending and not consistent with ACS.  He denied chest pain.  The patient was followed closely and treated  by infectious disease, neurology, and cardiology.  Due to risk of hemorrhagic conversion, neurology did recommend holding anticoagulation with Eliquis. He initially came in on Plavix and Eliquis given the fact that he had had some recurrent atrial fibrillation after his previous ablation.  He remained in sinus rhythm throughout the admission and Eliquis was held.  Plavix and aspirin were initially continued due to his drug-eluting stent placement x3 only 2 months prior.  See echo report from that admission below.  Ultimately, the patient was transferred to [place] under the care of CT surgery, Dr. Richardson on 8/20/2021, since he had performed his surgery last year on the mitral valve and there was concern for prosthetic endocarditis as a source for all the above.  He remained at [place] until  8/26/2021.  Repeat TTE on 8/21/2021 at [place] reported mild MR and no evidence of vegetation.  Infectious disease did recommend continued treatment with vancomycin through 10/3 and gentamicin through 9/5.  He was discharged home in stable  condition.  Of note, due to the risk of bleeding on anticoagulation in the setting of his embolic strokes and presence of a right lower extremity DVT, an IVC filter was placed on 8/25/2021 with plans for extraction in 3 to 6 months thereafter.  It appears the patient was discharged home from [place] on aspirin 81 mg daily, and not on Plavix or Eliquis.    Prior to that admission, he had been followed at [facility] for chronic systolic congestive heart failure/nonischemic cardiomyopathy -possibly tachycardia mediated in the setting of his atrial fibrillation, history of severe mitral valve regurgitation status post mini MVr, left atrial appendage ligation, and maze procedure per Dr. Richardson on 8/19/2020, atrial fibrillation diagnosed in 2017, with ablation for AF in October 2019, and atrial flutter and PVC ablations performed in March 2021, all followed at [place]. Of note, due to leak around the left atrial appendage, anticoagulation was continued after left atrial appendage ligation.He does also have a loop recorder in place implanted by Dr. Prieto ()  in August 2019.  Additionally, he did follow with [facility] for period of time and received 3 drug-eluting stents to the RCA on 6/3/2021 following an isolated episode of exertional chest pain that led to a nuclear stress test which was reported as abnormal.  Prior to MVr there was no documentation that he had any obstructive coronary artery disease.    After discharge from the hospitalization at [facility] [place] from when she was transferred to [place], we have been saw neurology in their office here at [facility] on 9/15 and Eliquis was resumed.  Also of note, the patient had been in the ER 9/9 with complaints of headache and dizziness.  MRI revealed no acute findings or new strokes     The patient is a [employer] and cannot receive blood transfusions.    He has followed up in our office once previously with LUDWIG Contreras, on 10/15/2021, at  which point he reported that he had been feeling fair overall.    Today he reports the following:  MAIN C/O IS ENERGY REDUCTION  STILL 'GASPING' FOR AIR - RANDOM. NOT AS SEVERE AS IT WAS; USUALLY OCCURS WITH ACTIVITY. NO ORTHOPNEA OR SWELLING - QUIT LASIX ABOUT A WEEK AGO BECAUSE OF CONCERNS RE:BPH (STATES HE MAY BE GETTING URO-LIFT).    HOME WEIGHTS HAVE TRENDED UP AT HOME OVER LAST 3-4 WEEK ABOUT 3 LBS.  HE STATES HE'S STEADY # ON HOME SCALE.  -SOA EPISODES STILL HAPPEN BUT AT MORE INTENSE LEVELS OF EXERTION AND LESS SEVERE WHEN IT HAPPENS    DIZZINESS W/STANDING      The following portions of the patient's history were reviewed and updated as appropriate: allergies, current medications, past family history, past medical history, past social history, past surgical history and problem list.    Review of Systems   Constitutional: Positive for fatigue and malaise/fatigue.   Cardiovascular: Positive for chest pain, dyspnea on exertion and palpitations. Negative for claudication, leg swelling, near-syncope, orthopnea, paroxysmal nocturnal dyspnea and syncope.   Respiratory: Positive for shortness of breath. Negative for cough.    Hematologic/Lymphatic: Does not bruise/bleed easily.   Musculoskeletal: Negative for falls.   Gastrointestinal: Negative for bloating.   Neurological: Positive for dizziness. Negative for light-headedness and weakness.       Allergies   Allergen Reactions   • Penicillins Hives       Current Outpatient Medications:   •  acetaminophen (Tylenol) 325 MG tablet, Take 650 mg by mouth Every 4 (Four) Hours As Needed., Disp: , Rfl:   •  apixaban (ELIQUIS) 5 MG tablet tablet, Take 1 tablet by mouth Every 12 (Twelve) Hours., Disp: 60 tablet, Rfl: 5  •  aspirin 81 MG chewable tablet, Chew 81 mg Daily., Disp: , Rfl:   •  atorvastatin (LIPITOR) 40 MG tablet, Take 1 tablet by mouth Daily., Disp: 90 tablet, Rfl: 1  •  Coenzyme Q10 (CO Q 10) 100 MG capsule, Take 300 mg by mouth., Disp: , Rfl:   •   "docusate sodium (Colace) 100 MG capsule, Take 100 mg by mouth As Needed., Disp: , Rfl:   •  ferrous sulfate 325 (65 FE) MG tablet, Take 325 mg by mouth 2 (Two) Times a Day., Disp: , Rfl:   •  furosemide (LASIX) 40 MG tablet, Take 40 mg by mouth Daily., Disp: , Rfl:   •  losartan (COZAAR) 25 MG tablet, Take 0.5 tablets by mouth Daily., Disp: , Rfl:   •  metoprolol tartrate (LOPRESSOR) 25 MG tablet, Take 12.5 mg by mouth 2 (Two) Times a Day., Disp: , Rfl:   •  potassium chloride (K-DUR,KLOR-CON) 20 MEQ CR tablet, Take 20 mEq by mouth Daily., Disp: , Rfl:   •  tamsulosin (FLOMAX) 0.4 MG capsule 24 hr capsule, TAKE 1 CAPSULE BY MOUTH EVERY DAY, Disp: 90 capsule, Rfl: 3         Objective:    /78   Pulse 75   Ht 195.6 cm (77\")   Wt 99.8 kg (220 lb)   SpO2 99%   BMI 26.09 kg/m²        Vitals and nursing note reviewed.   Constitutional:       General: Not in acute distress.     Appearance: Well-developed and not in distress. Not diaphoretic.   Neck:      Vascular: No JVD.   Pulmonary:      Effort: Pulmonary effort is normal. No respiratory distress.      Breath sounds: Normal breath sounds.   Cardiovascular:      Normal rate. Regular rhythm.      Murmurs: There is no murmur.   Edema:     Peripheral edema absent.   Abdominal:      Tenderness: There is no abdominal tenderness.   Skin:     General: Skin is warm and dry.   Neurological:      Mental Status: Alert and oriented to person, place, and time.         Lab Review:   Lab Results   Component Value Date    GLUCOSE 108 (H) 10/11/2021    BUN 17 10/11/2021    CREATININE 1.30 (H) 10/11/2021    EGFRIFNONA 57 (L) 10/11/2021    EGFRIFAFRI >59 08/02/2021    BCR 13.1 10/11/2021    K 3.9 10/11/2021    CO2 24.0 10/11/2021    CALCIUM 9.3 10/11/2021    ALBUMIN 4.00 10/11/2021    AST 24 10/11/2021    ALT 21 10/11/2021     Lab Results   Component Value Date    CHOL 140 08/10/2021    CHLPL 140 08/21/2021    TRIG 89 08/21/2021    HDL 28 (L) 08/21/2021    LDL 94 08/21/2021 " "    Lab Results   Component Value Date    WBC 7.20 10/29/2021    HGB 11.8 (L) 10/29/2021    HCT 36.1 (L) 10/29/2021    MCV 87.4 10/29/2021     10/29/2021             ECG 12 Lead    Date/Time: 11/5/2021 11:32 AM  Performed by: Jorge Owusu MD  Authorized by: Jorge Owusu MD   Comparison: compared with previous ECG from 10/8/2021  Similar to previous ECG  Rhythm: sinus rhythm  Conduction: non-specific intraventricular conduction delay  Clinical impression comment: Borderline ECG              Results for orders placed during the hospital encounter of 08/10/21    Adult Transthoracic Echo Complete W/ Cont if Necessary Per Protocol    Interpretation Summary  · Left ventricular ejection fraction appears to be 31 - 35%. Left ventricular systolic function is moderately decreased.  · The left ventricular cavity is mildly dilated.  · Estimated right ventricular systolic pressure from tricuspid regurgitation is normal (<35 mmHg).  · Normal size and function of the right ventricle.  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  · Saline test results are negative.    8/21/2021 echo report from [facility]:    1. Moderately enlarged left ventricle with normal systolic function.   2. The mitral valve has undergone surgical repair with mild residual   regurgitation.   3. Compared to prior study of 12/16/2020, left ventricular systolic   function is improved on today's study. There is slightly more mitral valve   regurgitation through the repaired mitral valve today than on prior study.    Previous outside records as reviewed and outlined by Dr. Owusu:     \"Relevant details learned through \"care everywhere\" search of [facility] records include the " "following:  -Catheterization performed by Dr. Howe 8/17/2020, reported 50% proximal circumflex stenosis, 60% distal RCA stenosis  -dc summary from surgery/postop recovery from Aug '21 included:  Synopsis:  55 year old man with severe MR,HTN, HLD, GERD, AFib, BPH, and anxiety underwent Mini MVr, Ligation L Atrial Appendage, and MAZE procedure on 8/19/20 with Dr. Richardson. Patient is a [employer].    Hospital Course:  On 8/19/2020, the patient underwent a Mini MVr, Ligation L Atrial Appendage, and MAZE with Dr. Richardson. The intraoperatiave CARLOS reveals: LVEF 40-45% with mild RV dysfunction. Patient is a [employer] thus received no blood but did get some Cellsaver intraoperatively. APS did an ES block in ORPostoperatively, the patient was transferred to the CVICU, where they were warmed, stabilized, and extubated. On POD#2, the patient was hemodynamically stable, weaned from vasoactive drips, and was transferred to the stepdown unit     Of note, the discharge summary reports the patient's \"anticoagulation\" plan upon discharge was to be aspirin alone, and that he could stop taking his Eliquis that had previously been prescribed.     Other relevant past hx learned:  Robert Piedra is a 55 y.o. male with past medical history significant for persistent atrial fibrillation, mitral regurgitation, CHF, HTN, and HLD, seen for preoperative examination prior to MVR+MAZE on 8/19/20 with Dr. Richardson. Patient was diagnosed with atrial fibrillation in 2017, without follow up until he developed heart failure requiring hospitalization in July 2019. He was referred to EP (Dr. Prieto) and underwent CARLOS/DCCV and ILR implant on 8/29/19, was enrolled in AMAZE trial (randomized to receive AF ablation without LLAA), continued on anticoagulation (Eliquis), and started on antiarrhythmic therapy. He underwent RF PVI and CTI line on 10/22/2019. He was also referred to Dr. Marcelino for CHF management, with TTE on 2/12/20 revealing recovery of " "systolic function (LVEF 55-65%) compared to OSH echo findings in 7/2019 (LVEF 42%), in addition to prolapse and possible flail of the posterior mitral valve leaflet with severe, eccentric, anteriorly-directed MR. Patient was doing well overall until mid July of this year, at which time his afib recurred, and he developed the onset of exertional dyspnea, fatigue, and mild lower extremity edema. His underwent CARLOS on 8/5/2020 which revealed a mildly dilated LV with preserved systolic function, normal RV size and function, and severe, eccentric primary MR due to flail P2 leaflet, with anteriorly directed jet.     Operative report reviewed: Confirmed prolapse of the posterior leaflet with a flail leaflet due to a torn chordae tendon a.  Procedures performed included a triangular resection of P2, 38 mm CG Future band with for mitral valve repair, left atrial cryomaze with oversewing, and inversion of the left atrial appendage.  This is all accomplished via a limited right anterolateral thoracotomy\" - Dr. Owusu       =========================================================      Addendum: 10/12/2021 TTE at [facility]:    This result has an attachment that is not available.   1.  Normal left ventricular cavity size and wall thickness with low normal   ejection fraction estimated at 50-55%   2.  Normal right ventricular size and systolic function   3.  The repaired mitral valve has an elevated mean gradient of 6 mmHg at   76 bpm and at least moderate mitral regurgitation   4.  RVSP estimated at 30 mmHg   5.  Moderate left atrial enlargement     Since prior echocardiogram 8/21/2021 mitral regurgitation is more severe   and the mean gradient is elevated.    Note reviewed per Dr. Richardson on 10/12:     \"55yo s/p Mvrepair with concerns for endocarditis and CVA as a complication. Today looks well. Echo to my eye is without any active infection. There is mild MR (disagree with the report) and overall I am satisfied with his clinical status. " Follow up prn.      Assessment:      Problem List Items Addressed This Visit        Cardiac and Vasculature    Essential hypertension    Chronic systolic congestive heart failure (HCC) - Primary    Overview     Last Assessment & Plan:   Formatting of this note might be different from the original.  - OSH TTE normal RV function, LVEF 30-35%. Uploaded to [facility]  - 8/21 TTE completed, see full report. Normal BiV function noted.   - Continue home lasix, BB, losartan  - Daily weights, strict I/Os         Coronary artery disease    Overview     6/3/2021 PCI with KELLY X 3 (  Xience Diana 2.5 x 33, Xience Diana 2.75 x 18,Xience Diana 3.5 x 33)  to RCA following abnormal nuclear stress test, which had been ordered after episode of exertional chest pain x 1 per pt report.         Persistent atrial fibrillation (HCC)    Overview     Last Assessment & Plan:   Formatting of this note might be different from the original.  Has had a slower than he anticipated recovery after his combined ablation procedure for PVC's and atypical atrial flutter although has had some other medical issues related to his prostate and prostatitis/UTI which have likely been a factor.  Otherwise does not appear that he has had true sustained atrial arrhythmias although has had a fairly high burden of ectopic atrial beats, may be PACs but appear to have a different focus than his usual sinus beats that hopefully will continue to calm down as his additional ablation heals.  Will defer further changes today.  We may consider completion of his left atrial appendage closure for a central leak from his Lariat in the future but this is deferred for now and will need to be cautious about interruption of anticoagulation for other reasons as this may be higher risk for appendage thrombus. RTC 3 months with Dr. Prieto as previously arranged.  Formatting of this note might be different from the original.  Added automatically from request for surgery [id number]  BMI:  "27.7 (wgt: 234 lbs hgt: 6'5\")- recent weight loss with decrease in alcohol  Alcohol: 10 glasses wine per week, and whiskey and beer  Pre DM- HGB A1C: 5.6 10/22/2019  Former smoker  Chads Vasc: 1 (HF)  Nml BP  No MANAV  Hyperlipidemia: atorvastatin  Anticoagulation: none pre ablation  Antiarrhythmic: dronedarone    Last Assessment & Plan:   Formatting of this note might be different from the original.  : he is persistently back in AF for the last couple weeks and has had symptoms associated with this.  CARLOS today confirmed that he has a torn chord with severe MR- unlikely that he will be able to maintain normal sinus rhythm until this is addressed as discussed elsewhere.  For now, I will have him increase his metoprolol for rate control- can increase to 75mg to start with and increase further to 100mg if resting HR consistently remain > 100.  It would be reasonable to load him with amiodarone at the time of valve surgery- he tolerated dronedarone in the past.  Would recommend that he have MAZE and YELENA ligation with Atria clip at the time of valve repair or replacement- he had favorable anatomy for appendage ligation in the AMAZE trial, but was randomized to receive ablation alone.  Otherwise, are available to assist with his arrhythmia management around the time of any therapy for his valve.  Tentatively scheduled to see him back in October to coordinate with his next AMAZE study visit, but can readjust his follow-up based on further valve interventions.         S/P MVR (mitral valve repair)    Overview     Last Assessment & Plan:   Formatting of this note might be different from the original.  -s/p Mini MVr with LLAA and MAZE on 8/19  -Thoracotomy protection: incision care, avoid R arm lifting or R side stress  - diuresis with IV Lasix 40mg BID  -Avoid amiodarone or AV maryan blocking medications due to heart block  -Weaned dobutamine off  - + BM  -Pain management with Tylenol, lidocaine patches, PRN oxycodone.         "            Plan:     1.  Chronic systolic CHF/probable nonischemic cardiomyopathy: He is currently compensated/euvolemic on exam.  Per most recent echo report in August at [place] LVEF has actually normalized.  As noted above, the patient states he has been offered AICD multiple times per Dr. Prieto in the past, but he continues to decline because this would end [place].  Furthermore, now that LVEF has normalized this would not be indicated.  -He was discharged from [facility] in August on Toprol-XL, but it appears he has been transitioned back to Lopressor 12.5 mg twice daily following discharge from [place].  Continue beta-blocker.  -Continue low-dose ARB.  The patient reports intolerance to Entresto in the past in the form of increased creatinine/KEENA.  - given recovery of EF, no other medication changes required at this time  -Heart healthy low-sodium diet  -Daily weights  -OK WITH PRN LASIX STRATEGY    2.  Recent treatment for endocarditis of mitral valve: He completed IV antibiotics per ID, Dr. Castro on 10/3.   -CT surgeon [place] thinks that his surgically repaired mitral valve looked okay on most recent echo; further follow-up per them    3.  Persistent atrial fibrillation: Maintaining normal sinus rhythm.  He denies palpitations.  See history above.  As of 9/15 he is back on anticoagulation with Eliquis.  Previous notes indicate incomplete closure of left atrial appendage, warranting continuation of anticoagulation.  Eliquis was held temporarily following embolic strokes felt to be secondary to his endocarditis, given concerns for risk of hemorrhagic conversion.    4.  Coronary artery disease: Stable, no angina.  He had 3 drug-eluting stents placed to the RCA on 6/30/2021 at [facility] (non-ACS indication).  His situation is unique and given his recent endocarditis, atrial fibrillation, strokes.  He is currently on aspirin 81 mg daily with Eliquis 5 mg twice daily and not on dual  antiplatelet therapy.  Continue current regimen.  -Continue high intensity statin for goal LDL less than 70.    Follow-up in 3 months, or sooner with any new or worsening symptoms      Jorge Owusu MD   12:06 CDT  11/05/21

## 2021-11-08 DIAGNOSIS — R97.20 ELEVATED PSA: Primary | ICD-10-CM

## 2021-11-08 DIAGNOSIS — R97.20 ELEVATED PSA: ICD-10-CM

## 2021-11-08 LAB — PSA SERPL-MCNC: 8.81 NG/ML (ref 0–4)
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
## 2021-11-15 ENCOUNTER — OFFICE VISIT (OUTPATIENT)
Dept: UROLOGY | Facility: CLINIC | Age: 56
End: 2021-11-15

## 2021-11-15 VITALS — WEIGHT: 224.6 LBS | BODY MASS INDEX: 26.52 KG/M2 | TEMPERATURE: 97.6 F | HEIGHT: 77 IN

## 2021-11-15 DIAGNOSIS — N13.8 BPH WITH URINARY OBSTRUCTION: ICD-10-CM

## 2021-11-15 DIAGNOSIS — N40.1 BPH WITH URINARY OBSTRUCTION: ICD-10-CM

## 2021-11-15 DIAGNOSIS — N30.01 ACUTE CYSTITIS WITH HEMATURIA: ICD-10-CM

## 2021-11-15 DIAGNOSIS — R97.20 ELEVATED PSA: Primary | ICD-10-CM

## 2021-11-15 LAB
BILIRUB BLD-MCNC: NEGATIVE MG/DL
CLARITY, POC: ABNORMAL
COLOR UR: YELLOW
GLUCOSE UR STRIP-MCNC: NEGATIVE MG/DL
KETONES UR QL: NEGATIVE
LEUKOCYTE EST, POC: ABNORMAL
NITRITE UR-MCNC: POSITIVE MG/ML
PH UR: 6 [PH] (ref 5–8)
PROT UR STRIP-MCNC: NEGATIVE MG/DL
RBC # UR STRIP: ABNORMAL /UL
SP GR UR: 1.02 (ref 1–1.03)
UROBILINOGEN UR QL: NORMAL

## 2021-11-15 PROCEDURE — 87077 CULTURE AEROBIC IDENTIFY: CPT | Performed by: UROLOGY

## 2021-11-15 PROCEDURE — 87086 URINE CULTURE/COLONY COUNT: CPT | Performed by: UROLOGY

## 2021-11-15 PROCEDURE — 87186 SC STD MICRODIL/AGAR DIL: CPT | Performed by: UROLOGY

## 2021-11-15 PROCEDURE — 81001 URINALYSIS AUTO W/SCOPE: CPT | Performed by: UROLOGY

## 2021-11-15 PROCEDURE — 99214 OFFICE O/P EST MOD 30 MIN: CPT | Performed by: UROLOGY

## 2021-11-15 RX ORDER — SODIUM CHLORIDE 9 MG/ML
100 INJECTION, SOLUTION INTRAVENOUS CONTINUOUS
Status: CANCELLED | OUTPATIENT
Start: 2021-11-15

## 2021-11-17 DIAGNOSIS — N30.01 ACUTE CYSTITIS WITH HEMATURIA: Primary | ICD-10-CM

## 2021-11-17 LAB — BACTERIA SPEC AEROBE CULT: ABNORMAL

## 2021-11-17 RX ORDER — SULFAMETHOXAZOLE AND TRIMETHOPRIM 800; 160 MG/1; MG/1
1 TABLET ORAL 2 TIMES DAILY
Qty: 14 TABLET | Refills: 0 | Status: SHIPPED | OUTPATIENT
Start: 2021-11-17 | End: 2021-11-24

## 2021-11-23 RX ORDER — LOSARTAN POTASSIUM 25 MG/1
TABLET ORAL
Qty: 180 TABLET | Refills: 1 | Status: SHIPPED | OUTPATIENT
Start: 2021-11-23 | End: 2022-01-04

## 2021-11-24 RX ORDER — CLOPIDOGREL BISULFATE 75 MG/1
TABLET ORAL
Qty: 90 TABLET | Refills: 1 | Status: SHIPPED | OUTPATIENT
Start: 2021-11-24 | End: 2022-01-04

## 2021-12-07 ENCOUNTER — OFFICE VISIT (OUTPATIENT)
Dept: FAMILY MEDICINE CLINIC | Facility: CLINIC | Age: 56
End: 2021-12-07

## 2021-12-07 VITALS
HEIGHT: 77 IN | SYSTOLIC BLOOD PRESSURE: 120 MMHG | WEIGHT: 225 LBS | BODY MASS INDEX: 26.57 KG/M2 | HEART RATE: 75 BPM | DIASTOLIC BLOOD PRESSURE: 80 MMHG | RESPIRATION RATE: 16 BRPM | OXYGEN SATURATION: 98 %

## 2021-12-07 DIAGNOSIS — I10 ESSENTIAL HYPERTENSION: Primary | ICD-10-CM

## 2021-12-07 DIAGNOSIS — I48.19 PERSISTENT ATRIAL FIBRILLATION (HCC): ICD-10-CM

## 2021-12-07 DIAGNOSIS — Z12.11 SCREEN FOR COLON CANCER: ICD-10-CM

## 2021-12-07 DIAGNOSIS — N40.1 BENIGN PROSTATIC HYPERPLASIA WITH LOWER URINARY TRACT SYMPTOMS, SYMPTOM DETAILS UNSPECIFIED: ICD-10-CM

## 2021-12-07 PROCEDURE — 99213 OFFICE O/P EST LOW 20 MIN: CPT | Performed by: FAMILY MEDICINE
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
## 2021-12-20 ENCOUNTER — TELEPHONE (OUTPATIENT)
Dept: CARDIOLOGY CLINIC | Age: 56
End: 2021-12-20

## 2021-12-20 NOTE — TELEPHONE ENCOUNTER
Patient called stating he is scheduled for ECHO on 1/4/22 but he is switching insurance over next year and his testing has been precerted with the [facility] and not [facility]. He wants to know if he even needs this since he had ECHO done at [facility] with Dr. Elena Harmon on 10/12/21.

## 2021-12-20 NOTE — TELEPHONE ENCOUNTER
Patient had echo at outlying facility in October does patient still need echo in January?  You saw him in May and said to have echo and appt in 6 months

## 2022-01-03 ENCOUNTER — TELEPHONE (OUTPATIENT)
Dept: CARDIOLOGY CLINIC | Age: 57
End: 2022-01-03

## 2022-01-04 ENCOUNTER — OFFICE VISIT (OUTPATIENT)
Dept: CARDIOLOGY CLINIC | Age: 57
End: 2022-01-04
Payer: COMMERCIAL

## 2022-01-04 VITALS
HEART RATE: 87 BPM | WEIGHT: 230 LBS | DIASTOLIC BLOOD PRESSURE: 74 MMHG | SYSTOLIC BLOOD PRESSURE: 132 MMHG | BODY MASS INDEX: 27.16 KG/M2 | HEIGHT: 77 IN

## 2022-01-04 DIAGNOSIS — I25.110 CORONARY ARTERY DISEASE INVOLVING NATIVE CORONARY ARTERY OF NATIVE HEART WITH UNSTABLE ANGINA PECTORIS (HCC): Primary | ICD-10-CM

## 2022-01-04 PROCEDURE — G8484 FLU IMMUNIZE NO ADMIN: HCPCS | Performed by: INTERNAL MEDICINE

## 2022-01-04 PROCEDURE — 1036F TOBACCO NON-USER: CPT | Performed by: INTERNAL MEDICINE

## 2022-01-04 PROCEDURE — 99214 OFFICE O/P EST MOD 30 MIN: CPT | Performed by: INTERNAL MEDICINE

## 2022-01-04 PROCEDURE — G8427 DOCREV CUR MEDS BY ELIG CLIN: HCPCS | Performed by: INTERNAL MEDICINE

## 2022-01-04 PROCEDURE — 3017F COLORECTAL CA SCREEN DOC REV: CPT | Performed by: INTERNAL MEDICINE

## 2022-01-04 PROCEDURE — G8419 CALC BMI OUT NRM PARAM NOF/U: HCPCS | Performed by: INTERNAL MEDICINE

## 2022-01-04 RX ORDER — ASPIRIN 81 MG/1
81 TABLET ORAL DAILY
COMMUNITY

## 2022-01-04 RX ORDER — ATORVASTATIN CALCIUM 40 MG/1
40 TABLET, FILM COATED ORAL DAILY
COMMUNITY

## 2022-01-04 RX ORDER — LOSARTAN POTASSIUM 25 MG/1
12.5 TABLET ORAL DAILY
COMMUNITY
End: 2022-05-18

## 2022-01-04 NOTE — PROGRESS NOTES
[facility], [address], [address] [facility]  Phone: [phone]  Fax: [phone]    OFFICE VISIT:  2022    Kosta Dillon - : 1965    I appreciate the opportunity of participating in the care of Kosta Dillon. He is a very pleasant 61 y.o. male who I had the opportunity of seeing in my office today, Records from your office have been obtained and reviewed. Reason For Visit:  Archie Carlos is a 61 y.o. male who is here for 6 Month Follow-Up      Archie Carlos is known to have a history of severe MR, hypertension, hyperlipidemia, GERD atrial fibrillation and BPH. He underwent previous ablation then had recurrent atrial fibrillation. He had congestive heart failure symptoms while in atrial fibrillation. On 2020, the patient underwent a Mini MVr, Ligation L Atrial Appendage, and MAZE with Dr. Soledad Mays. The intraoperatiave AYANNA reveals: LVEF 40-45% with mild RV dysfunction  He was placed on Eliquis 5 mg twice daily because his appendage closure was not complete. Patient was released from CT surgery 2020  Is following DALI Rivers at [facility].   He currently has a loop recorder in    He underwent recent cardiac evaluation in May 2021 after he presented with chest pain, he underwent 2D echo which showed LV systolic function of 33% and no significant mitral regurgitation across the annuloplasty ring, stress test which was abnormal, based on that he underwent heart catheterization with me on May 27, 2021, he underwent successful PCI with stenting of his RCA    He is here for follow-up visit, he tells me he is doing well no severe chest pain no ER visits, he reports some none typical chest discomfort, he reports no worsening shortness of breath or leg swelling or palpitation or syncope        Kosta Dillon has the following history as recorded in [employer]:  Patient Active Problem List    Diagnosis Date Noted    Mitral regurgitation     Hypertension     Atrial facility-administered medications for this visit. Review of Systems  Constitutional  no significant activity change, appetite change, or unexpected weight change. No fever, chills or diaphoresis. No fatigue. HEENT  no significant rhinorrhea or epistaxis. No tinnitus or significant hearing loss. Eyes  no sudden vision change or amaurosis. Respiratory  no significant wheezing, stridor, apnea or cough. No dyspnea on exertion or shortness of breath. Cardiovascular  no exertional chest pain, orthopnea or PND. No sensation of arrhythmia or slow heart rate. No claudication or leg edema. Gastrointestinal  no abdominal swelling or pain. No blood in stool. No severe constipation, diarrhea, nausea, or vomiting. Genitourinary  no difficulty urinating, dysuria, frequency, or urgency. No flank pain or hematuria. No  previous radiation or chemotherapy  Musculoskeletal  no back pain, gait disturbance, or myalgia. Skin  no color change or rash. No pallor. No new surgical incision. Neurologic  no speech difficulty, facial asymmetry or lateralizing weakness. No seizures, presyncope, syncope, or significant dizziness. Hematologic  no easy bruising or excessive bleeding. Psychiatric  no severe anxiety or insomnia. No confusion. All other review of systems are negative. Objective  Vital Signs - There were no vitals taken for this visit. General - Jairo Hawkins is alert, cooperative, and pleasant. Well groomed. No acute distress. Body habitus is normal.  HEENT  The head is normocephalic. No circumoral cyanosis. Dentition is normal.   Ears and nose externally normal. No abnormal scars or lesions noted  EYES -  No Xanthelasma, no arcus senilis, no conjunctival hemorrhages or discharge. Neck - Supple, without increased jugular venous pressures. No carotid bruits. No mass. Respiratory - Lungs are clear bilaterally. No wheezes or rales. Normal effort without use of accessory muscles. symptoms of dyspnea during infusion that resolved   in recovery. Baseline EKG showed normal sinus rhythm, CAD. During   stress there were no significant EKG changes or rhythm changes,   negative for ischemia. Baseline and peak blood pressures were 108/60,   and 108/60 respectively.  Baseline and peak heart rates were 82 and    93 respectively. The resting and stress perfusion images were compared. There is a   fixed moderate size and moderate intensity defect of the inferior   wall, very little reversibility, considered positive for scar with a   matching wall motion abnormality as noted below. Gated wall motion demonstrates inferior wall akinesis to severe   hypokinesis, although wall segments contract normally, ejection   fraction reduced at 33%. Conclusions   This is an abnormal pharmacologic nuclear stress test, positive for   inferior wall scar, basically no ischemia with inferior wall   hypokinesis to akinesis and severely reduced ejection fraction   overall. Signed by Dr Marcos Fernández on 5/12/2021 3:32 PM        Procedure-heart catheterization     Procedure Type      Diagnostic procedure:DCA, Coronary Angiogram      PCI procedure:Right Coronary, RCA, PTCA, GARRET      Conclusions      Successful PCI / Drug Eluting Stent of the proximal Right Coronary Artery. Successful PCI / Drug Eluting Stent of the distal Right Coronary Artery. Patient tolerated the procedure well. Recommendations      Medical management. Dual antiplatelet agents for one year. Aggressive risk factor management.       Signatures      ----------------------------------------------------------------   Electronically signed by Milton Sunshine MD(Performing   Physician) on 06/03/2021 16:14   ----------------------------------------------------------------        Assessment:       -------------------  Coronary artery disease -status post recent heart catheterization and PCI to the RCA with 3 stents -stable from cardiovascular standpoint with no worsening angina, no shortness of breath or leg swelling or palpitation or syncope  We will continue with current medical management    We will continue with and Eliquis and aspirin. Blood pressure and heart rate controlled. Medical management includes  Beta-blocker, ARB, statin and remains anticoagulated on Eliquis    Recent echo showed improved EF and he is clinically doing much better     Follow-up in the office after 6 months     I appreciate the opportunity of participating in the care and treatment of this patient. Mayra Schmidt MD, [facility], [facility]  Interventional Cardiologist, Endovascular Specialist   Medical Director, Structural Heart Program   [facility]    EMR dragon/transcription disclaimer: Much of this encounter note is electronic transcription/translation of spoken language to printed tach. Electronic translation of spoken language may be erroneous, or at times, nonsensical words or phrases may be inadvertently transcribed.  Although, I have reviewed the note for such errors, some may still exist.      Cc:  Esperanza Alarcon MD

## 2022-01-07 ENCOUNTER — TELEPHONE (OUTPATIENT)
Dept: FAMILY MEDICINE CLINIC | Facility: CLINIC | Age: 57
End: 2022-01-07

## 2022-01-07 DIAGNOSIS — R30.0 DYSURIA: Primary | ICD-10-CM

## 2022-01-07 RX ORDER — CIPROFLOXACIN 500 MG/1
500 TABLET, FILM COATED ORAL EVERY 12 HOURS SCHEDULED
Qty: 28 TABLET | Refills: 0 | Status: SHIPPED | OUTPATIENT
Start: 2022-01-07 | End: 2022-01-21

## 2022-01-12 LAB
APPEARANCE UR: CLEAR
BACTERIA #/AREA URNS HPF: ABNORMAL /HPF
BACTERIA UR CULT: ABNORMAL
BACTERIA UR CULT: ABNORMAL
BILIRUB UR QL STRIP: NEGATIVE
CASTS URNS MICRO: ABNORMAL
COLOR UR: YELLOW
EPI CELLS #/AREA URNS HPF: ABNORMAL /HPF
GLUCOSE UR QL: NEGATIVE
HGB UR QL STRIP: ABNORMAL
KETONES UR QL STRIP: NEGATIVE
LEUKOCYTE ESTERASE UR QL STRIP: ABNORMAL
NITRITE UR QL STRIP: POSITIVE
OTHER ANTIBIOTIC SUSC ISLT: ABNORMAL
PH UR STRIP: 6 [PH] (ref 5–8)
PROT UR QL STRIP: ABNORMAL
RBC #/AREA URNS HPF: ABNORMAL /HPF
SP GR UR: 1.01 (ref 1–1.03)
UROBILINOGEN UR STRIP-MCNC: ABNORMAL MG/DL
WBC #/AREA URNS HPF: ABNORMAL /HPF

## 2022-01-17 ENCOUNTER — OFFICE VISIT (OUTPATIENT)
Dept: NEUROLOGY | Facility: CLINIC | Age: 57
End: 2022-01-17

## 2022-01-17 VITALS
SYSTOLIC BLOOD PRESSURE: 132 MMHG | HEIGHT: 77 IN | HEART RATE: 84 BPM | OXYGEN SATURATION: 98 % | RESPIRATION RATE: 17 BRPM | DIASTOLIC BLOOD PRESSURE: 68 MMHG | BODY MASS INDEX: 26.68 KG/M2

## 2022-01-17 DIAGNOSIS — R42 DIZZINESS: ICD-10-CM

## 2022-01-17 DIAGNOSIS — I63.40 CEREBROVASCULAR ACCIDENT (CVA) DUE TO EMBOLISM OF CEREBRAL ARTERY: Primary | ICD-10-CM

## 2022-01-17 DIAGNOSIS — E78.2 MIXED HYPERLIPIDEMIA: ICD-10-CM

## 2022-01-17 DIAGNOSIS — I10 ESSENTIAL HYPERTENSION: ICD-10-CM

## 2022-01-17 DIAGNOSIS — I48.19 PERSISTENT ATRIAL FIBRILLATION: ICD-10-CM

## 2022-01-17 PROCEDURE — 99213 OFFICE O/P EST LOW 20 MIN: CPT | Performed by: NURSE PRACTITIONER
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
## 2022-01-18 ENCOUNTER — OFFICE VISIT (OUTPATIENT)
Dept: FAMILY MEDICINE CLINIC | Facility: CLINIC | Age: 57
End: 2022-01-18

## 2022-01-18 VITALS
HEART RATE: 85 BPM | WEIGHT: 227 LBS | HEIGHT: 77 IN | BODY MASS INDEX: 26.8 KG/M2 | DIASTOLIC BLOOD PRESSURE: 76 MMHG | SYSTOLIC BLOOD PRESSURE: 126 MMHG | OXYGEN SATURATION: 97 % | RESPIRATION RATE: 16 BRPM

## 2022-01-18 DIAGNOSIS — Z00.00 WELLNESS EXAMINATION: Primary | ICD-10-CM

## 2022-01-18 PROCEDURE — 99396 PREV VISIT EST AGE 40-64: CPT | Performed by: FAMILY MEDICINE
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
## 2022-01-24 ENCOUNTER — TELEPHONE (OUTPATIENT)
Dept: FAMILY MEDICINE CLINIC | Facility: CLINIC | Age: 57
End: 2022-01-24

## 2022-01-24 NOTE — TELEPHONE ENCOUNTER
Caller: Robert Piedra    Relationship to patient: Self    Best call back number: [phone]    Patient is needing to know the status of the [employer] physical forms that was supposed to be ready today. He will pick those up when ready. Please advise.

## 2022-02-01 ENCOUNTER — TELEPHONE (OUTPATIENT)
Dept: CARDIOLOGY CLINIC | Age: 57
End: 2022-02-01

## 2022-02-01 NOTE — TELEPHONE ENCOUNTER
Pt called stating he had a heart procedure done in [place] and the dr. Amanda Boo the eliquis and start plavix. Pt just wanted to make sure you were ok with him going off eliquis.

## 2022-02-01 NOTE — TELEPHONE ENCOUNTER
Per Dr. Manus Olszewski the eliquis is prescribed by Dr. Dorothea Shone at [facility] for stroke prevention.

## 2022-02-02 ENCOUNTER — TELEPHONE (OUTPATIENT)
Dept: FAMILY MEDICINE CLINIC | Facility: CLINIC | Age: 57
End: 2022-02-02

## 2022-02-02 NOTE — TELEPHONE ENCOUNTER
Caller: Robert Piedra    Relationship: Self    Best call back number: [phone]    What is the best time to reach you: ANY    Who are you requesting to speak with (clinical staff, provider,  specific staff member): CLINICAL    What was the call regarding: PATIENT STATES DR. XIE PERFORMED A [employer] PHYSICAL ON 1/18/22 BUT HE DID NOT FILL OUT ALL OF THE PAPERWORK. HE STATES THE [employer] WILL NOT ACCEPT THIS AND HE WOULD LIKE TO BRING THIS PAPERWORK BACK IN FOR DR. XIE TO FILL OUT BEFORE THE BAD WEATHER. PLEASE ADVISE.    Do you require a callback: YES

## 2022-02-09 ENCOUNTER — TELEPHONE (OUTPATIENT)
Dept: CARDIOLOGY | Facility: CLINIC | Age: 57
End: 2022-02-09

## 2022-02-09 NOTE — TELEPHONE ENCOUNTER
"Contacted patient to ask if he would be keeping tomorrow's appointment. He was seen by Dr Owusu 11/5/2021, then saw [facility] on 1/4/2022. I told him it's not a good idea for someone to see two different cardiology groups in the same town. Patient said if Dr Owusu didn't want to see him he would cancel the appointment, but he sees different cardiologists for different issues. I told him I would call him back after talking to Dr Owusu.    Called patient back this afternoon to let him know Dr Owusu stated the patient should only follow up with one cardiology group or the other. The patient stated he saw Dr Owusu because he had an infection and the ambulance brought him here, and he sees [facility] cardiology for his stents. He said, \"Dr Owusu didn't have anything to do with my stents.\" He said to cancel tomorrow's appointment; he will follow with [facility] cardiology. Paola Turner MA     "

## 2022-03-01 ENCOUNTER — OFFICE VISIT (OUTPATIENT)
Dept: GASTROENTEROLOGY | Facility: CLINIC | Age: 57
End: 2022-03-01

## 2022-03-01 VITALS
BODY MASS INDEX: 27.28 KG/M2 | HEART RATE: 83 BPM | HEIGHT: 77 IN | WEIGHT: 231 LBS | TEMPERATURE: 97.3 F | DIASTOLIC BLOOD PRESSURE: 80 MMHG | SYSTOLIC BLOOD PRESSURE: 130 MMHG | OXYGEN SATURATION: 98 %

## 2022-03-01 DIAGNOSIS — D68.9 COAGULOPATHY: ICD-10-CM

## 2022-03-01 DIAGNOSIS — Z12.11 ENCOUNTER FOR SCREENING FOR MALIGNANT NEOPLASM OF COLON: Primary | ICD-10-CM

## 2022-03-01 PROCEDURE — S0285 CNSLT BEFORE SCREEN COLONOSC: HCPCS | Performed by: NURSE PRACTITIONER

## 2022-03-01 RX ORDER — CLOPIDOGREL BISULFATE 75 MG/1
75 TABLET ORAL DAILY
COMMUNITY
End: 2022-06-01

## 2022-03-07 RX ORDER — ATORVASTATIN CALCIUM 40 MG/1
TABLET, FILM COATED ORAL
Qty: 90 TABLET | Refills: 1 | Status: SHIPPED | OUTPATIENT
Start: 2022-03-07 | End: 2022-07-19

## 2022-03-25 ENCOUNTER — TRANSCRIBE ORDERS (OUTPATIENT)
Dept: ADMINISTRATIVE | Facility: HOSPITAL | Age: 57
End: 2022-03-25

## 2022-03-25 ENCOUNTER — HOSPITAL ENCOUNTER (OUTPATIENT)
Dept: GENERAL RADIOLOGY | Facility: HOSPITAL | Age: 57
Discharge: HOME | End: 2022-03-25

## 2022-03-25 ENCOUNTER — TELEPHONE (OUTPATIENT)
Dept: NEUROLOGY | Facility: CLINIC | Age: 57
End: 2022-03-25

## 2022-03-25 DIAGNOSIS — Z02.1 PRE-EMPLOYMENT EXAMINATION: Primary | ICD-10-CM

## 2022-03-25 DIAGNOSIS — Z02.1 PRE-EMPLOYMENT EXAMINATION: ICD-10-CM

## 2022-03-25 PROCEDURE — 72100 X-RAY EXAM L-S SPINE 2/3 VWS: CPT

## 2022-03-28 ENCOUNTER — TELEPHONE (OUTPATIENT)
Dept: NEUROLOGY | Facility: CLINIC | Age: 57
End: 2022-03-28

## 2022-03-28 NOTE — TELEPHONE ENCOUNTER
Pt advised he will print the return to work letter from his my chart. Pt understood if he needed anything else to let me know. LS

## 2022-04-06 ENCOUNTER — OFFICE VISIT (OUTPATIENT)
Dept: FAMILY MEDICINE CLINIC | Facility: CLINIC | Age: 57
End: 2022-04-06

## 2022-04-06 VITALS
HEIGHT: 77 IN | OXYGEN SATURATION: 98 % | HEART RATE: 87 BPM | SYSTOLIC BLOOD PRESSURE: 122 MMHG | BODY MASS INDEX: 26.92 KG/M2 | WEIGHT: 228 LBS | RESPIRATION RATE: 16 BRPM | DIASTOLIC BLOOD PRESSURE: 82 MMHG

## 2022-04-06 DIAGNOSIS — N40.1 BENIGN PROSTATIC HYPERPLASIA WITH LOWER URINARY TRACT SYMPTOMS, SYMPTOM DETAILS UNSPECIFIED: ICD-10-CM

## 2022-04-06 DIAGNOSIS — I25.10 CORONARY ARTERY DISEASE INVOLVING NATIVE CORONARY ARTERY OF NATIVE HEART WITHOUT ANGINA PECTORIS: Primary | ICD-10-CM

## 2022-04-06 DIAGNOSIS — E78.2 MIXED HYPERLIPIDEMIA: ICD-10-CM

## 2022-04-06 PROCEDURE — 99213 OFFICE O/P EST LOW 20 MIN: CPT | Performed by: FAMILY MEDICINE

## 2022-04-06 RX ORDER — CLOPIDOGREL BISULFATE 75 MG/1
TABLET ORAL
COMMUNITY
Start: 2022-01-31 | End: 2022-06-01

## 2022-04-06 RX ORDER — CLINDAMYCIN HYDROCHLORIDE 300 MG/1
CAPSULE ORAL
COMMUNITY
Start: 2022-03-28 | End: 2022-07-19
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
## 2022-04-07 LAB
ALBUMIN SERPL-MCNC: 4.1 G/DL (ref 3.5–5.2)
ALBUMIN/GLOB SERPL: 1.6 G/DL
ALP SERPL-CCNC: 77 U/L (ref 39–117)
ALT SERPL-CCNC: 20 U/L (ref 1–41)
AST SERPL-CCNC: 17 U/L (ref 1–40)
BASOPHILS # BLD AUTO: 0.01 10*3/MM3 (ref 0–0.2)
BASOPHILS NFR BLD AUTO: 0.3 % (ref 0–1.5)
BILIRUB SERPL-MCNC: 0.7 MG/DL (ref 0–1.2)
BUN SERPL-MCNC: 16 MG/DL (ref 6–20)
BUN/CREAT SERPL: 14.3 (ref 7–25)
CALCIUM SERPL-MCNC: 9 MG/DL (ref 8.6–10.5)
CHLORIDE SERPL-SCNC: 105 MMOL/L (ref 98–107)
CHOLEST SERPL-MCNC: 178 MG/DL (ref 0–200)
CHOLEST/HDLC SERPL: 2.78 {RATIO}
CO2 SERPL-SCNC: 23.9 MMOL/L (ref 22–29)
CREAT SERPL-MCNC: 1.12 MG/DL (ref 0.76–1.27)
EGFRCR SERPLBLD CKD-EPI 2021: 76.1 ML/MIN/1.73
EOSINOPHIL # BLD AUTO: 0.1 10*3/MM3 (ref 0–0.4)
EOSINOPHIL NFR BLD AUTO: 2.5 % (ref 0.3–6.2)
ERYTHROCYTE [DISTWIDTH] IN BLOOD BY AUTOMATED COUNT: 12.9 % (ref 12.3–15.4)
GLOBULIN SER CALC-MCNC: 2.6 GM/DL
GLUCOSE SERPL-MCNC: 97 MG/DL (ref 65–99)
HCT VFR BLD AUTO: 41.3 % (ref 37.5–51)
HDLC SERPL-MCNC: 64 MG/DL (ref 40–60)
HGB BLD-MCNC: 14 G/DL (ref 13–17.7)
IMM GRANULOCYTES # BLD AUTO: 0 10*3/MM3 (ref 0–0.05)
IMM GRANULOCYTES NFR BLD AUTO: 0 % (ref 0–0.5)
LDLC SERPL CALC-MCNC: 103 MG/DL (ref 0–100)
LYMPHOCYTES # BLD AUTO: 1.12 10*3/MM3 (ref 0.7–3.1)
LYMPHOCYTES NFR BLD AUTO: 28.1 % (ref 19.6–45.3)
MCH RBC QN AUTO: 31.1 PG (ref 26.6–33)
MCHC RBC AUTO-ENTMCNC: 33.9 G/DL (ref 31.5–35.7)
MCV RBC AUTO: 91.8 FL (ref 79–97)
MONOCYTES # BLD AUTO: 0.39 10*3/MM3 (ref 0.1–0.9)
MONOCYTES NFR BLD AUTO: 9.8 % (ref 5–12)
NEUTROPHILS # BLD AUTO: 2.36 10*3/MM3 (ref 1.7–7)
NEUTROPHILS NFR BLD AUTO: 59.3 % (ref 42.7–76)
NRBC BLD AUTO-RTO: 0 /100 WBC (ref 0–0.2)
PLATELET # BLD AUTO: 181 10*3/MM3 (ref 140–450)
POTASSIUM SERPL-SCNC: 4.3 MMOL/L (ref 3.5–5.2)
PROT SERPL-MCNC: 6.7 G/DL (ref 6–8.5)
RBC # BLD AUTO: 4.5 10*6/MM3 (ref 4.14–5.8)
SODIUM SERPL-SCNC: 139 MMOL/L (ref 136–145)
TRIGL SERPL-MCNC: 58 MG/DL (ref 0–150)
VLDLC SERPL CALC-MCNC: 11 MG/DL (ref 5–40)
WBC # BLD AUTO: 3.98 10*3/MM3 (ref 3.4–10.8)

## 2022-04-07 RX ORDER — ATORVASTATIN CALCIUM 80 MG/1
80 TABLET, FILM COATED ORAL NIGHTLY
Qty: 30 TABLET | Refills: 4 | Status: SHIPPED | OUTPATIENT
Start: 2022-04-07 | End: 2022-07-05

## 2022-05-18 RX ORDER — LOSARTAN POTASSIUM 25 MG/1
TABLET ORAL
Qty: 180 TABLET | Refills: 1 | Status: SHIPPED | OUTPATIENT
Start: 2022-05-18 | End: 2022-08-30

## 2022-05-23 ENCOUNTER — TELEPHONE (OUTPATIENT)
Dept: UROLOGY | Facility: CLINIC | Age: 57
End: 2022-05-23

## 2022-05-23 NOTE — TELEPHONE ENCOUNTER
Provider: DR GUADALUPE  Caller: TERESSA LATHAM  Relationship to Patient: SELF    Phone Number: [phone]  Reason for Call: PT HAS UPCOMING APPT ON June 1 AND IS ASKING IF HE IS SUPPOSED TO HAVE LABS DONE. NO ORDER SEEN IN CHART, NO NOTES ON LAST OFFICE VISIT. PT'S PCP IS ALSO ASKING ABOUT PT'S PSA LABS.    IF PT DOESN'T ANSWER, YOU CAN SEND PT A MESSAGE IN [facility]    When was the patient last seen: 11-15-21

## 2022-05-23 NOTE — TELEPHONE ENCOUNTER
Patient needs psa labs before appointment. Please call and schedule labs.     Not sure why the hub scheduled patient as a new referral. Patient last seen in 11/21

## 2022-05-24 NOTE — TELEPHONE ENCOUNTER
I left voicemail letting patient know he will need a PSA before he sees Dr. Cota next week. I asked patient to call back.

## 2022-05-25 LAB — PSA SERPL-MCNC: 8.9 NG/ML (ref 0–4)

## 2022-06-01 ENCOUNTER — OFFICE VISIT (OUTPATIENT)
Dept: UROLOGY | Facility: CLINIC | Age: 57
End: 2022-06-01

## 2022-06-01 VITALS — WEIGHT: 230 LBS | BODY MASS INDEX: 27.16 KG/M2 | HEIGHT: 77 IN | TEMPERATURE: 98.3 F

## 2022-06-01 DIAGNOSIS — R97.20 ELEVATED PSA: ICD-10-CM

## 2022-06-01 DIAGNOSIS — N52.9 IMPOTENCE OF ORGANIC ORIGIN: ICD-10-CM

## 2022-06-01 DIAGNOSIS — N13.8 BPH WITH URINARY OBSTRUCTION: Primary | ICD-10-CM

## 2022-06-01 DIAGNOSIS — N40.1 BPH WITH URINARY OBSTRUCTION: Primary | ICD-10-CM

## 2022-06-01 LAB
BILIRUB BLD-MCNC: NEGATIVE MG/DL
CLARITY, POC: CLEAR
COLOR UR: YELLOW
GLUCOSE UR STRIP-MCNC: NEGATIVE MG/DL
KETONES UR QL: NEGATIVE
LEUKOCYTE EST, POC: ABNORMAL
NITRITE UR-MCNC: NEGATIVE MG/ML
PH UR: 6 [PH] (ref 5–8)
PROT UR STRIP-MCNC: NEGATIVE MG/DL
RBC # UR STRIP: NEGATIVE /UL
SP GR UR: 1.02 (ref 1–1.03)
UROBILINOGEN UR QL: NORMAL

## 2022-06-01 PROCEDURE — 99214 OFFICE O/P EST MOD 30 MIN: CPT | Performed by: UROLOGY

## 2022-06-01 PROCEDURE — 81003 URINALYSIS AUTO W/O SCOPE: CPT | Performed by: UROLOGY

## 2022-06-01 PROCEDURE — 51798 US URINE CAPACITY MEASURE: CPT | Performed by: UROLOGY

## 2022-07-05 RX ORDER — ATORVASTATIN CALCIUM 80 MG/1
TABLET, FILM COATED ORAL
Qty: 90 TABLET | Refills: 1 | Status: SHIPPED | OUTPATIENT
Start: 2022-07-05 | End: 2022-08-30

## 2022-07-19 ENCOUNTER — OFFICE VISIT (OUTPATIENT)
Dept: NEUROLOGY | Facility: CLINIC | Age: 57
End: 2022-07-19

## 2022-07-19 VITALS
HEIGHT: 77 IN | OXYGEN SATURATION: 99 % | SYSTOLIC BLOOD PRESSURE: 138 MMHG | WEIGHT: 230 LBS | BODY MASS INDEX: 27.16 KG/M2 | RESPIRATION RATE: 18 BRPM | HEART RATE: 90 BPM | DIASTOLIC BLOOD PRESSURE: 72 MMHG

## 2022-07-19 DIAGNOSIS — I10 ESSENTIAL HYPERTENSION: ICD-10-CM

## 2022-07-19 DIAGNOSIS — I48.0 PAROXYSMAL ATRIAL FIBRILLATION: ICD-10-CM

## 2022-07-19 DIAGNOSIS — E78.2 MIXED HYPERLIPIDEMIA: ICD-10-CM

## 2022-07-19 DIAGNOSIS — Z86.73 HISTORY OF STROKE WITHOUT RESIDUAL DEFICITS: Primary | ICD-10-CM

## 2022-07-19 PROCEDURE — 99214 OFFICE O/P EST MOD 30 MIN: CPT | Performed by: NURSE PRACTITIONER
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
## 2022-08-15 ENCOUNTER — TELEPHONE (OUTPATIENT)
Dept: UROLOGY | Facility: CLINIC | Age: 57
End: 2022-08-15

## 2022-08-15 NOTE — TELEPHONE ENCOUNTER
Pt called and stated that he would like some catheters sent to him that are not coude and not in the package curled up.  He ask that a new order be sent in.  I sent message to mariaelena

## 2022-08-22 ENCOUNTER — TELEPHONE (OUTPATIENT)
Dept: UROLOGY | Facility: CLINIC | Age: 57
End: 2022-08-22

## 2022-08-30 RX ORDER — ATORVASTATIN CALCIUM 80 MG/1
TABLET, FILM COATED ORAL
Qty: 90 TABLET | Refills: 1 | Status: SHIPPED | OUTPATIENT
Start: 2022-08-30 | End: 2023-03-17 | Stop reason: SDUPTHER

## 2022-08-30 RX ORDER — LOSARTAN POTASSIUM 25 MG/1
TABLET ORAL
Qty: 180 TABLET | Refills: 1 | Status: SHIPPED | OUTPATIENT
Start: 2022-08-30

## 2022-10-06 ENCOUNTER — OFFICE VISIT (OUTPATIENT)
Dept: FAMILY MEDICINE CLINIC | Facility: CLINIC | Age: 57
End: 2022-10-06

## 2022-10-06 VITALS
RESPIRATION RATE: 16 BRPM | DIASTOLIC BLOOD PRESSURE: 70 MMHG | OXYGEN SATURATION: 98 % | SYSTOLIC BLOOD PRESSURE: 136 MMHG | HEART RATE: 74 BPM | WEIGHT: 241 LBS | BODY MASS INDEX: 28.46 KG/M2 | TEMPERATURE: 98.8 F | HEIGHT: 77 IN

## 2022-10-06 DIAGNOSIS — Z23 NEED FOR IMMUNIZATION AGAINST INFLUENZA: ICD-10-CM

## 2022-10-06 DIAGNOSIS — E78.2 MIXED HYPERLIPIDEMIA: ICD-10-CM

## 2022-10-06 DIAGNOSIS — Z23 NEED FOR VACCINATION: ICD-10-CM

## 2022-10-06 DIAGNOSIS — I25.10 CORONARY ARTERY DISEASE INVOLVING NATIVE CORONARY ARTERY OF NATIVE HEART WITHOUT ANGINA PECTORIS: Primary | ICD-10-CM

## 2022-10-06 DIAGNOSIS — Z12.11 SCREEN FOR COLON CANCER: ICD-10-CM

## 2022-10-06 PROCEDURE — 90686 IIV4 VACC NO PRSV 0.5 ML IM: CPT | Performed by: FAMILY MEDICINE

## 2022-10-06 PROCEDURE — 90471 IMMUNIZATION ADMIN: CPT | Performed by: FAMILY MEDICINE

## 2022-10-06 PROCEDURE — 99213 OFFICE O/P EST LOW 20 MIN: CPT | Performed by: FAMILY MEDICINE
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
## 2022-10-07 LAB
ALBUMIN SERPL-MCNC: 4 G/DL (ref 3.5–5.2)
ALBUMIN/GLOB SERPL: 1.6 G/DL
ALP SERPL-CCNC: 86 U/L (ref 39–117)
ALT SERPL-CCNC: 17 U/L (ref 1–41)
AST SERPL-CCNC: 19 U/L (ref 1–40)
BASOPHILS # BLD AUTO: 0.01 10*3/MM3 (ref 0–0.2)
BASOPHILS NFR BLD AUTO: 0.2 % (ref 0–1.5)
BILIRUB SERPL-MCNC: 0.3 MG/DL (ref 0–1.2)
BUN SERPL-MCNC: 20 MG/DL (ref 6–20)
BUN/CREAT SERPL: 16.8 (ref 7–25)
CALCIUM SERPL-MCNC: 9.9 MG/DL (ref 8.6–10.5)
CHLORIDE SERPL-SCNC: 107 MMOL/L (ref 98–107)
CHOLEST SERPL-MCNC: 155 MG/DL (ref 0–200)
CHOLEST/HDLC SERPL: 3.3 {RATIO}
CO2 SERPL-SCNC: 26.7 MMOL/L (ref 22–29)
CREAT SERPL-MCNC: 1.19 MG/DL (ref 0.76–1.27)
EGFRCR SERPLBLD CKD-EPI 2021: 70.8 ML/MIN/1.73
EOSINOPHIL # BLD AUTO: 0.18 10*3/MM3 (ref 0–0.4)
EOSINOPHIL NFR BLD AUTO: 2.9 % (ref 0.3–6.2)
ERYTHROCYTE [DISTWIDTH] IN BLOOD BY AUTOMATED COUNT: 11.7 % (ref 12.3–15.4)
GLOBULIN SER CALC-MCNC: 2.5 GM/DL
GLUCOSE SERPL-MCNC: 89 MG/DL (ref 65–99)
HCT VFR BLD AUTO: 39.9 % (ref 37.5–51)
HDLC SERPL-MCNC: 47 MG/DL (ref 40–60)
HGB BLD-MCNC: 13.7 G/DL (ref 13–17.7)
IMM GRANULOCYTES # BLD AUTO: 0.02 10*3/MM3 (ref 0–0.05)
IMM GRANULOCYTES NFR BLD AUTO: 0.3 % (ref 0–0.5)
LDLC SERPL CALC-MCNC: 88 MG/DL (ref 0–100)
LYMPHOCYTES # BLD AUTO: 1.56 10*3/MM3 (ref 0.7–3.1)
LYMPHOCYTES NFR BLD AUTO: 25.2 % (ref 19.6–45.3)
MCH RBC QN AUTO: 32 PG (ref 26.6–33)
MCHC RBC AUTO-ENTMCNC: 34.3 G/DL (ref 31.5–35.7)
MCV RBC AUTO: 93.2 FL (ref 79–97)
MONOCYTES # BLD AUTO: 0.56 10*3/MM3 (ref 0.1–0.9)
MONOCYTES NFR BLD AUTO: 9.1 % (ref 5–12)
NEUTROPHILS # BLD AUTO: 3.85 10*3/MM3 (ref 1.7–7)
NEUTROPHILS NFR BLD AUTO: 62.3 % (ref 42.7–76)
NRBC BLD AUTO-RTO: 0 /100 WBC (ref 0–0.2)
PLATELET # BLD AUTO: 247 10*3/MM3 (ref 140–450)
POTASSIUM SERPL-SCNC: 4.7 MMOL/L (ref 3.5–5.2)
PROT SERPL-MCNC: 6.5 G/DL (ref 6–8.5)
RBC # BLD AUTO: 4.28 10*6/MM3 (ref 4.14–5.8)
SODIUM SERPL-SCNC: 144 MMOL/L (ref 136–145)
TRIGL SERPL-MCNC: 107 MG/DL (ref 0–150)
VLDLC SERPL CALC-MCNC: 20 MG/DL (ref 5–40)
WBC # BLD AUTO: 6.18 10*3/MM3 (ref 3.4–10.8)

## 2022-11-22 ENCOUNTER — TELEPHONE (OUTPATIENT)
Dept: UROLOGY | Facility: CLINIC | Age: 57
End: 2022-11-22

## 2022-11-22 ENCOUNTER — OFFICE VISIT (OUTPATIENT)
Dept: GASTROENTEROLOGY | Facility: CLINIC | Age: 57
End: 2022-11-22

## 2022-11-22 VITALS
HEART RATE: 86 BPM | TEMPERATURE: 96.9 F | WEIGHT: 237 LBS | SYSTOLIC BLOOD PRESSURE: 110 MMHG | BODY MASS INDEX: 27.98 KG/M2 | HEIGHT: 77 IN | DIASTOLIC BLOOD PRESSURE: 76 MMHG | OXYGEN SATURATION: 98 %

## 2022-11-22 DIAGNOSIS — Z12.11 ENCOUNTER FOR SCREENING FOR MALIGNANT NEOPLASM OF COLON: Primary | ICD-10-CM

## 2022-11-22 PROCEDURE — S0285 CNSLT BEFORE SCREEN COLONOSC: HCPCS | Performed by: NURSE PRACTITIONER

## 2022-11-22 NOTE — TELEPHONE ENCOUNTER
Called Patient to remind them to get PSA prior to appointment.  Left voicemail with Patient.  If patient calls back it is ok for the HUB to tell the pt the message.
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
## 2022-11-23 ENCOUNTER — LAB (OUTPATIENT)
Dept: UROLOGY | Facility: CLINIC | Age: 57
End: 2022-11-23

## 2022-11-23 DIAGNOSIS — R97.20 ELEVATED PSA: ICD-10-CM

## 2022-11-24 LAB
PSA FREE MFR SERPL: 22.2 %
PSA FREE SERPL-MCNC: 2.04 NG/ML
PSA SERPL-MCNC: 9.2 NG/ML (ref 0–4)

## 2022-11-30 ENCOUNTER — OFFICE VISIT (OUTPATIENT)
Dept: UROLOGY | Facility: CLINIC | Age: 57
End: 2022-11-30

## 2022-11-30 VITALS — HEIGHT: 77 IN | TEMPERATURE: 96.2 F | WEIGHT: 238.4 LBS | BODY MASS INDEX: 28.15 KG/M2

## 2022-11-30 DIAGNOSIS — N40.1 BPH WITH URINARY OBSTRUCTION: Primary | ICD-10-CM

## 2022-11-30 DIAGNOSIS — N13.8 BPH WITH URINARY OBSTRUCTION: Primary | ICD-10-CM

## 2022-11-30 DIAGNOSIS — R97.20 ELEVATED PSA: ICD-10-CM

## 2022-11-30 DIAGNOSIS — N52.9 IMPOTENCE OF ORGANIC ORIGIN: ICD-10-CM

## 2022-11-30 DIAGNOSIS — R33.9 INCOMPLETE EMPTYING OF BLADDER: ICD-10-CM

## 2022-11-30 PROCEDURE — 99213 OFFICE O/P EST LOW 20 MIN: CPT | Performed by: UROLOGY

## 2022-11-30 PROCEDURE — 81001 URINALYSIS AUTO W/SCOPE: CPT | Performed by: UROLOGY

## 2022-11-30 PROCEDURE — 51798 US URINE CAPACITY MEASURE: CPT | Performed by: UROLOGY

## 2022-12-02 ENCOUNTER — TELEPHONE (OUTPATIENT)
Dept: UROLOGY | Facility: CLINIC | Age: 57
End: 2022-12-02

## 2022-12-02 DIAGNOSIS — N40.1 BENIGN PROSTATIC HYPERPLASIA WITH URINARY OBSTRUCTION: ICD-10-CM

## 2022-12-02 DIAGNOSIS — N13.8 BENIGN PROSTATIC HYPERPLASIA WITH URINARY OBSTRUCTION: ICD-10-CM

## 2022-12-02 RX ORDER — TAMSULOSIN HYDROCHLORIDE 0.4 MG/1
1 CAPSULE ORAL DAILY
Qty: 90 CAPSULE | Refills: 3 | Status: SHIPPED | OUTPATIENT
Start: 2022-12-02

## 2022-12-07 ENCOUNTER — CLINICAL SUPPORT NO REQUIREMENTS (OUTPATIENT)
Dept: CARDIOLOGY | Facility: CLINIC | Age: 57
End: 2022-12-07
Payer: COMMERCIAL

## 2022-12-07 ENCOUNTER — OFFICE VISIT (OUTPATIENT)
Dept: CARDIOLOGY | Facility: CLINIC | Age: 57
End: 2022-12-07

## 2022-12-07 VITALS
DIASTOLIC BLOOD PRESSURE: 82 MMHG | HEIGHT: 77 IN | BODY MASS INDEX: 28.08 KG/M2 | HEART RATE: 80 BPM | SYSTOLIC BLOOD PRESSURE: 134 MMHG | OXYGEN SATURATION: 97 % | WEIGHT: 237.8 LBS

## 2022-12-07 DIAGNOSIS — Z98.890 S/P MVR (MITRAL VALVE REPAIR): ICD-10-CM

## 2022-12-07 DIAGNOSIS — I48.19 PERSISTENT ATRIAL FIBRILLATION: ICD-10-CM

## 2022-12-07 DIAGNOSIS — I25.10 CORONARY ARTERY DISEASE INVOLVING NATIVE CORONARY ARTERY OF NATIVE HEART WITHOUT ANGINA PECTORIS: ICD-10-CM

## 2022-12-07 DIAGNOSIS — I50.22 CHRONIC SYSTOLIC CONGESTIVE HEART FAILURE: Primary | ICD-10-CM

## 2022-12-07 DIAGNOSIS — I10 ESSENTIAL HYPERTENSION: ICD-10-CM

## 2022-12-07 PROCEDURE — 93000 ELECTROCARDIOGRAM COMPLETE: CPT | Performed by: NURSE PRACTITIONER

## 2022-12-07 PROCEDURE — 99214 OFFICE O/P EST MOD 30 MIN: CPT | Performed by: NURSE PRACTITIONER

## 2022-12-07 PROCEDURE — 93291 INTERROG DEV EVAL SCRMS IP: CPT | Performed by: INTERNAL MEDICINE

## 2022-12-07 RX ORDER — LOSARTAN POTASSIUM 25 MG/1
12.5 TABLET ORAL
Qty: 15 TABLET | Refills: 11 | Status: SHIPPED | OUTPATIENT
Start: 2022-12-07

## 2022-12-07 RX ORDER — ASPIRIN 81 MG/1
81 TABLET, CHEWABLE ORAL DAILY
Qty: 30 TABLET | Refills: 11 | Status: SHIPPED | OUTPATIENT
Start: 2022-12-07

## 2022-12-07 NOTE — Clinical Note
LINQ check, new patient to our practice.  Patient doesn't want remote monitor.  Follow up 6 months in office.

## 2022-12-08 NOTE — PROGRESS NOTES
Subjective:     Encounter Date:12/07/22      Patient ID: Robert Piedra is a 57 y.o. male.    Chief Complaint: Follow-up bacterial endocarditis, CAD, chronic systolic CHF, atrial fibrillation and flutter, history of severe MR status post mini MV repair    History of Present Illness     57-year-old male returns today for follow-up.  By way of review, Dr. Owusu met him when he presented to [facility] [place] on 8/10/2021 with complaints of altered speech that did resolve and recent intermittent fevers.   Further evaluation/work-up revealed the patient had multiple embolic strokes, splenic infarcts, right lower extremity DVT, and concern for mitral valve vegetation/enterococcal bacterial endocarditis based on TTE.  Cardiology was initially consulted for elevated troponin.  However, this was found to be flat trending and not consistent with ACS.  He denied chest pain.  The patient was followed closely and treated  by infectious disease, neurology, and cardiology.  Due to risk of hemorrhagic conversion, neurology did recommend holding anticoagulation with Eliquis. He initially came in on Plavix and Eliquis given the fact that he had had some recurrent atrial fibrillation after his previous ablation.  He remained in sinus rhythm throughout the admission and Eliquis was held.  Plavix and aspirin were initially continued due to his drug-eluting stent placement x3 only 2 months prior.  See echo report from that admission below.  Ultimately, the patient was transferred to [place] under the care of CT surgery, Dr. Richardson on 8/20/2021, since he had performed his surgery last year on the mitral valve and there was concern for prosthetic endocarditis as a source for all the above.  He remained at [place] until  8/26/2021.  Repeat TTE on 8/21/2021 at [place] reported mild MR and no evidence of vegetation.  Infectious disease did recommend continued treatment with vancomycin through 10/3 and gentamicin through 9/5.  He was discharged  home in stable condition.  Of note, due to the risk of bleeding on anticoagulation in the setting of his embolic strokes and presence of a right lower extremity DVT, an IVC filter was placed on 8/25/2021 with plans for extraction in 3 to 6 months thereafter.  It appears the patient was discharged home from [place] on aspirin 81 mg daily, and not on Plavix or Eliquis.    Prior to that admission, he had been followed at [facility] for chronic systolic congestive heart failure/nonischemic cardiomyopathy -possibly tachycardia mediated in the setting of his atrial fibrillation, history of severe mitral valve regurgitation status post mini MVr, left atrial appendage ligation, and maze procedure per Dr. Richardson on 8/19/2020, atrial fibrillation diagnosed in 2017, with ablation for AF in October 2019, and atrial flutter and PVC ablations performed in March 2021, all followed at [place]. Of note, due to leak around the left atrial appendage, anticoagulation was continued after left atrial appendage ligation.He does also have a loop recorder in place implanted by Dr. Prieto ()  in August 2019.  Additionally, he did follow with [facility] for period of time and received 3 drug-eluting stents to the RCA on 6/3/2021 following an isolated episode of exertional chest pain that led to a nuclear stress test which was reported as abnormal.  Prior to MVr there was no documentation that he had any obstructive coronary artery disease.    After discharge from the hospitalization at [facility] from when she was transferred to [place], he saw neurology in their office here at [facility] on 9/15 and Eliquis was resumed.  Also of note, the patient had been in the ER 9/9 with complaints of headache and dizziness.  MRI revealed no acute findings or new strokes     The patient is a L[employer] and cannot receive blood transfusions.    He last saw Dr. Owusu in November 2021 and his primary complaint was lack of energy.  He  "was still having random episodes of gasping for breath but it had improved.  This would usually occur with activity.  He was not having orthopnea or edema.  He quit taking Lasix about 1 week prior due to urologic concerns.  He had gained about 3 pounds in 3 to 4 weeks-weight was around 215 pounds.  Overall, dyspnea on exertion was improving.  He was having some dizziness with standing.  No changes were made at that visit.    Since his last visit, he did see [facility] cardiology once and has continued to follow at [place].  There was some confusion about who he wanted to be his primary local cardiologist.    Today he presents for follow-up and states he wishes to follow with Dr. Owusu and not [facility] cardiology going forward.  He states he is feeling great.  He went back to work in April.  He is doing significant exertional activities without any limitation whatsoever.  He denies any chest discomfort, shortness of breath,  edema, orthopnea, PND, syncope or presyncope.  He reports well-controlled blood pressure.  In January of this year he was scheduled for procedure to completely close his left atrial appendage given the previous leak, but CARLOS revealed the left atrial appendage was completely sealed without evidence of thrombus, therefore the procedure was canceled and Eliquis was discontinued by Dr. Prieto at [place].  He is actually going to see Dr. Prieto in a couple of weeks on 12/21.  He tells me he feels like he has \"occasional PVCs\" that do not really bother him.  His atorvastatin was uptitrated at the end of August per his PCP.    The following portions of the patient's history were reviewed and updated as appropriate: allergies, current medications, past family history, past medical history, past social history, past surgical history and problem list.    Review of Systems   Constitutional: Negative for malaise/fatigue.   Cardiovascular: Negative for chest pain, claudication, leg swelling, near-syncope, " "orthopnea, palpitations, paroxysmal nocturnal dyspnea and syncope.   Respiratory: Negative for cough and shortness of breath.    Hematologic/Lymphatic: Does not bruise/bleed easily.   Musculoskeletal: Negative for falls.   Gastrointestinal: Negative for bloating.   Neurological: Negative for dizziness, light-headedness and weakness.       Allergies   Allergen Reactions   • Penicillins Hives       Current Outpatient Medications:   •  aspirin 81 MG chewable tablet, Chew 1 tablet Daily., Disp: 30 tablet, Rfl: 11  •  atorvastatin (LIPITOR) 80 MG tablet, TAKE ONE TABLET BY MOUTH EVERY NIGHT AT BEDTIME, Disp: 90 tablet, Rfl: 1  •  Coenzyme Q10 (CO Q 10) 100 MG capsule, Take 300 mg by mouth., Disp: , Rfl:   •  losartan (COZAAR) 25 MG tablet, Take 0.5 tablets by mouth Daily., Disp: 15 tablet, Rfl: 11  •  metoprolol tartrate (LOPRESSOR) 25 MG tablet, Take 0.5 tablets by mouth 2 (Two) Times a Day., Disp: 30 tablet, Rfl: 11  •  tamsulosin (FLOMAX) 0.4 MG capsule 24 hr capsule, Take 1 capsule by mouth Daily., Disp: 90 capsule, Rfl: 3         Objective:    /82   Pulse 80   Ht 195.6 cm (77\")   Wt 108 kg (237 lb 12.8 oz)   SpO2 97%   BMI 28.20 kg/m²        Vitals and nursing note reviewed.   Constitutional:       General: Not in acute distress.     Appearance: Well-developed and not in distress. Not diaphoretic.   Neck:      Vascular: No JVD.   Pulmonary:      Effort: Pulmonary effort is normal. No respiratory distress.      Breath sounds: Normal breath sounds.   Cardiovascular:      Normal rate. Regular rhythm.      Murmurs: There is no murmur.   Edema:     Peripheral edema absent.   Abdominal:      Tenderness: There is no abdominal tenderness.   Skin:     General: Skin is warm and dry.   Neurological:      Mental Status: Alert and oriented to person, place, and time.         Lab Review:   Lab Results   Component Value Date    HGBA1C 5.9 08/21/2021     Lab Results   Component Value Date    GLUCOSE 89 10/06/2022    BUN " 20 10/06/2022    CREATININE 1.19 10/06/2022    EGFRIFNONA 57 (L) 10/11/2021    EGFRIFAFRI >59 08/02/2021    BCR 16.8 10/06/2022    K 4.7 10/06/2022    CO2 26.7 10/06/2022    CALCIUM 9.9 10/06/2022    PROTENTOTREF 6.5 10/06/2022    ALBUMIN 4.00 10/06/2022    LABIL2 1.6 10/06/2022    AST 19 10/06/2022    ALT 17 10/06/2022     Lab Results   Component Value Date    CHOL 140 08/10/2021    CHLPL 155 10/06/2022    TRIG 107 10/06/2022    HDL 47 10/06/2022    LDL 88 10/06/2022     Lab Results   Component Value Date    WBC 6.18 10/06/2022    HGB 13.7 10/06/2022    HCT 39.9 10/06/2022    MCV 93.2 10/06/2022     10/06/2022             ECG 12 Lead    Date/Time: 12/7/2022 6:09 PM  Performed by: Malgorzata Mora APRN  Authorized by: Malgorzata Mora APRN   Comparison: compared with previous ECG from 11/5/2021  Similar to previous ECG  Rhythm: sinus rhythm  BPM: 77  Conduction: non-specific intraventricular conduction delay             1/2022 CARLOS at [facility] :    Echocardiogram Summary Comments     Patient is s/p MV repair with ligation of YELENA presenting now for closure   of residual leak of YELENA     1. Normal biventricular function with LVEF >55% and no RWMA   2. Mitral valve repair with annuloplasty and mild MR (EROA 0.15cm2 by   PISA). There is a fix linear mass that appears to be a suture originating   from the leaflet edge into LA   3. No other significant valvular disease   4. YELENA ligated and no leak seen by CFD   5. No intracardiac shunt or pericardial effusion     Procedure   1. LA appendagram performed and no residual leak demonstrated. Procedure   aborted.   2. No pericardial effusion after removal of the transseptal catheters      Results for orders placed during the hospital encounter of 08/10/21    Adult Transthoracic Echo Complete W/ Cont if Necessary Per Protocol    Interpretation Summary  · Left ventricular ejection fraction appears to be 31 - 35%. Left ventricular systolic function is moderately decreased.  · The left  "ventricular cavity is mildly dilated.  · Estimated right ventricular systolic pressure from tricuspid regurgitation is normal (<35 mmHg).  · Normal size and function of the right ventricle.  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  · Saline test results are negative.    8/21/2021 echo report from [facility]:    1. Moderately enlarged left ventricle with normal systolic function.   2. The mitral valve has undergone surgical repair with mild residual   regurgitation.   3. Compared to prior study of 12/16/2020, left ventricular systolic   function is improved on today's study. There is slightly more mitral valve   regurgitation through the repaired mitral valve today than on prior study.    Previous outside records as reviewed and outlined by Dr. Owusu:     \"Relevant details learned through \"care everywhere\" search of [facility] records include the following:  -Catheterization performed by Dr. Howe 8/17/2020, reported 50% proximal circumflex stenosis, 60% distal RCA stenosis  -dc summary from surgery/postop recovery from Aug '21 included:  Synopsis:  55 year old man with severe MR,HTN, HLD, GERD, AFib, BPH, and anxiety underwent Mini MVr, Ligation L Atrial Appendage, and MAZE procedure on 8/19/20 with Dr. Richardson. Patient is a [employer].    Hospital Course:  On 8/19/2020, the patient underwent a Mini MVr, Ligation L Atrial Appendage, and MAZE with Dr. Richardson. The intraoperatiave CARLOS reveals: LVEF 40-45% with mild RV dysfunction. Patient is a Restorationism thus received no blood but did get some Cellsaver intraoperatively. APS did an ES block in ORPostoperatively, the patient was transferred to the CVICU, where they were warmed, stabilized, and extubated. " "On POD#2, the patient was hemodynamically stable, weaned from vasoactive drips, and was transferred to the stepdown unit     Of note, the discharge summary reports the patient's \"anticoagulation\" plan upon discharge was to be aspirin alone, and that he could stop taking his Eliquis that had previously been prescribed.     Other relevant past hx learned:  Robert Piedra is a 55 y.o. male with past medical history significant for persistent atrial fibrillation, mitral regurgitation, CHF, HTN, and HLD, seen for preoperative examination prior to MVR+MAZE on 8/19/20 with Dr. Richardson. Patient was diagnosed with atrial fibrillation in 2017, without follow up until he developed heart failure requiring hospitalization in July 2019. He was referred to EP (Dr. Prieto) and underwent CARLOS/DCCV and ILR implant on 8/29/19, was enrolled in AMAZE trial (randomized to receive AF ablation without LLAA), continued on anticoagulation (Eliquis), and started on antiarrhythmic therapy. He underwent RF PVI and CTI line on 10/22/2019. He was also referred to Dr. Marcelino for CHF management, with TTE on 2/12/20 revealing recovery of systolic function (LVEF 55-65%) compared to OSH echo findings in 7/2019 (LVEF 42%), in addition to prolapse and possible flail of the posterior mitral valve leaflet with severe, eccentric, anteriorly-directed MR. Patient was doing well overall until mid July of this year, at which time his afib recurred, and he developed the onset of exertional dyspnea, fatigue, and mild lower extremity edema. His underwent CARLOS on 8/5/2020 which revealed a mildly dilated LV with preserved systolic function, normal RV size and function, and severe, eccentric primary MR due to flail P2 leaflet, with anteriorly directed jet.     Operative report reviewed: Confirmed prolapse of the posterior leaflet with a flail leaflet due to a torn chordae tendon a.  Procedures performed included a triangular resection of P2, 38 mm CG Future band with for " "mitral valve repair, left atrial cryomaze with oversewing, and inversion of the left atrial appendage.  This is all accomplished via a limited right anterolateral thoracotomy\" - Dr. Owusu       =========================================================      Addendum: 10/12/2021 TTE at [facility]:    This result has an attachment that is not available.   1.  Normal left ventricular cavity size and wall thickness with low normal   ejection fraction estimated at 50-55%   2.  Normal right ventricular size and systolic function   3.  The repaired mitral valve has an elevated mean gradient of 6 mmHg at   76 bpm and at least moderate mitral regurgitation   4.  RVSP estimated at 30 mmHg   5.  Moderate left atrial enlargement     Since prior echocardiogram 8/21/2021 mitral regurgitation is more severe   and the mean gradient is elevated.    Note reviewed per Dr. Richardson on 10/12:     \"57yo s/p Mvrepair with concerns for endocarditis and CVA as a complication. Today looks well. Echo to my eye is without any active infection. There is mild MR (disagree with the report) and overall I am satisfied with his clinical status. Follow up prn.      Assessment:      Problem List Items Addressed This Visit        Cardiac and Vasculature    Essential hypertension    Relevant Medications    losartan (COZAAR) 25 MG tablet    metoprolol tartrate (LOPRESSOR) 25 MG tablet    Chronic systolic congestive heart failure (HCC) - Primary    Overview     Last Assessment & Plan:   Formatting of this note might be different from the original.  - OSH TTE normal RV function, LVEF 30-35%. Uploaded to [facility]  - 8/21 TTE completed, see full report. Normal BiV function noted.   - Continue home lasix, BB, losartan  - Daily weights, strict I/Os         Relevant Medications    metoprolol tartrate (LOPRESSOR) 25 MG tablet    Coronary artery disease    Overview     6/3/2021 PCI with KELLY X 3 (  Xience Diana 2.5 x 33, Xience Diana 2.75 x 18,Xience Diana 3.5 x 33)  to RCA " "following abnormal nuclear stress test, which had been ordered after episode of exertional chest pain x 1 per pt report.         Relevant Medications    metoprolol tartrate (LOPRESSOR) 25 MG tablet    Persistent atrial fibrillation (HCC)    Overview     Last Assessment & Plan:   Formatting of this note might be different from the original.  Has had a slower than he anticipated recovery after his combined ablation procedure for PVC's and atypical atrial flutter although has had some other medical issues related to his prostate and prostatitis/UTI which have likely been a factor.  Otherwise does not appear that he has had true sustained atrial arrhythmias although has had a fairly high burden of ectopic atrial beats, may be PACs but appear to have a different focus than his usual sinus beats that hopefully will continue to calm down as his additional ablation heals.  Will defer further changes today.  We may consider completion of his left atrial appendage closure for a central leak from his Lariat in the future but this is deferred for now and will need to be cautious about interruption of anticoagulation for other reasons as this may be higher risk for appendage thrombus. RTC 3 months with Dr. Prieto as previously arranged.  Formatting of this note might be different from the original.  Added automatically from request for surgery [id number]  BMI: 27.7 (wgt: 234 lbs hgt: 6'5\")- recent weight loss with decrease in alcohol  Alcohol: 10 glasses wine per week, and whiskey and beer  Pre DM- HGB A1C: 5.6 10/22/2019  Former smoker  Chads Vasc: 1 (HF)  Nml BP  No MANAV  Hyperlipidemia: atorvastatin  Anticoagulation: none pre ablation  Antiarrhythmic: dronedarone    Last Assessment & Plan:   Formatting of this note might be different from the original.  : he is persistently back in AF for the last couple weeks and has had symptoms associated with this.  CARLOS today confirmed that he has a torn chord with severe MR- unlikely that he " will be able to maintain normal sinus rhythm until this is addressed as discussed elsewhere.  For now, I will have him increase his metoprolol for rate control- can increase to 75mg to start with and increase further to 100mg if resting HR consistently remain > 100.  It would be reasonable to load him with amiodarone at the time of valve surgery- he tolerated dronedarone in the past.  Would recommend that he have MAZE and YELENA ligation with Atria clip at the time of valve repair or replacement- he had favorable anatomy for appendage ligation in the AMAZE trial, but was randomized to receive ablation alone.  Otherwise, are available to assist with his arrhythmia management around the time of any therapy for his valve.  Tentatively scheduled to see him back in October to coordinate with his next AMAZE study visit, but can readjust his follow-up based on further valve interventions.         Relevant Medications    metoprolol tartrate (LOPRESSOR) 25 MG tablet    S/P MVR (mitral valve repair)    Overview     Last Assessment & Plan:   Formatting of this note might be different from the original.  -s/p Mini MVr with LLAA and MAZE on 8/19  -Thoracotomy protection: incision care, avoid R arm lifting or R side stress  - diuresis with IV Lasix 40mg BID  -Avoid amiodarone or AV maryan blocking medications due to heart block  -Weaned dobutamine off  - + BM  -Pain management with Tylenol, lidocaine patches, PRN oxycodone.                 Plan:     1.  Chronic systolic CHF/probable nonischemic cardiomyopathy: Established problem, stable.  He is compensated/euvolemic.  Most recent echocardiogram confirms his LVEF remains normal-see January 2022 CARLOS report from [place] above.    -Continue Lopressor 12.5 mg twice daily-he states he has quite a bit of Lopressor at home.  For convenience sake and primarily given his history of LV systolic dysfunction, will consider transitioning him to Toprol-XL 25 mg once daily at follow-up.   Continue beta-blocker.  -Continue low-dose ARB.  The patient reports intolerance to Entresto in the past in the form of increased creatinine/KEENA.  - given recovery of EF, no other medication changes required at this time  -Heart healthy low-sodium diet  -Daily weights    2.  History of treatment for endocarditis of mitral valve: He completed IV antibiotics per ID, Dr. Castro on 10/3/2021.   --Most recent echocardiogram-CARLOS in January 2022 at [place] referenced above    3.  Persistent atrial fibrillation: Maintaining normal sinus rhythm.  He denies palpitations.  See history above.  Eliquis was discontinued by Dr. Prieto at [place] in January 2022 given evidence of complete closure of left atrial appendage per CARLOS at that time.  I did have his loop recorder interrogated today and this reveals he has not had any atrial fibrillation since a 6-minute episode on 10/31/2022.  His overall burden is less than 0.1%    -Keep follow-up with Dr. Prieto on 12/21/2022    4.  Coronary artery disease: Stable, no angina.  He had 3 drug-eluting stents placed to the RCA on 6/30/2021 at [facility] (non-ACS indication).    -Continue aspirin 81 mg daily  -Continue high intensity statin for goal LDL less than 70.  His most recent LDL was slightly above goal at 88, but this was checked only approximately 5 weeks after atorvastatin dose increase so we will hold off on further changes at this time.  If next lipid panel on atorvastatin 80 mg nightly remains above goal we will consider addition of Zetia or PCSK9 inhibitor depending upon the result    Given his prior mitral valve repair he will need 2D echocardiograms at least every 2 to 3 years, sooner if warranted based on symptoms.     Follow-up in 6 months with Dr. Owusu, call sooner with new or worsening symptoms      Malgorzata E. Knees, APRN   18:08 CST  12/07/22

## 2022-12-13 NOTE — PROGRESS NOTES
Linq Report- Clinic Interrogation    December 13, 2022    Primary Cardiologist:  Francoise  Reason for implant:  New patient to our practice  Battery:  good  Events:  6 min episode of AF, 136 bpm  Changes:  none    Follow up:  Every 6 months in office    Patient declines remote monitoring.

## 2023-01-10 ENCOUNTER — HOSPITAL ENCOUNTER (OUTPATIENT)
Facility: HOSPITAL | Age: 58
Setting detail: HOSPITAL OUTPATIENT SURGERY
Discharge: HOME | End: 2023-01-10
Attending: INTERNAL MEDICINE | Admitting: INTERNAL MEDICINE
Payer: COMMERCIAL

## 2023-01-10 ENCOUNTER — ANESTHESIA EVENT (OUTPATIENT)
Dept: GASTROENTEROLOGY | Facility: HOSPITAL | Age: 58
End: 2023-01-10
Payer: COMMERCIAL

## 2023-01-10 ENCOUNTER — ANESTHESIA (OUTPATIENT)
Dept: GASTROENTEROLOGY | Facility: HOSPITAL | Age: 58
End: 2023-01-10
Payer: COMMERCIAL

## 2023-01-10 VITALS
RESPIRATION RATE: 22 BRPM | HEIGHT: 77 IN | BODY MASS INDEX: 28.1 KG/M2 | TEMPERATURE: 97 F | HEART RATE: 60 BPM | DIASTOLIC BLOOD PRESSURE: 81 MMHG | WEIGHT: 238 LBS | SYSTOLIC BLOOD PRESSURE: 116 MMHG | OXYGEN SATURATION: 100 %

## 2023-01-10 DIAGNOSIS — Z12.11 ENCOUNTER FOR SCREENING FOR MALIGNANT NEOPLASM OF COLON: ICD-10-CM

## 2023-01-10 PROCEDURE — 88305 TISSUE EXAM BY PATHOLOGIST: CPT | Performed by: INTERNAL MEDICINE

## 2023-01-10 PROCEDURE — 45385 COLONOSCOPY W/LESION REMOVAL: CPT | Performed by: INTERNAL MEDICINE

## 2023-01-10 PROCEDURE — 25010000002 PROPOFOL 10 MG/ML EMULSION

## 2023-01-10 RX ORDER — SODIUM CHLORIDE 9 MG/ML
500 INJECTION, SOLUTION INTRAVENOUS CONTINUOUS PRN
Status: DISCONTINUED | OUTPATIENT
Start: 2023-01-10 | End: 2023-01-10 | Stop reason: HOSPADM

## 2023-01-10 RX ORDER — LIDOCAINE HYDROCHLORIDE 20 MG/ML
INJECTION, SOLUTION EPIDURAL; INFILTRATION; INTRACAUDAL; PERINEURAL AS NEEDED
Status: DISCONTINUED | OUTPATIENT
Start: 2023-01-10 | End: 2023-01-10 | Stop reason: SURG

## 2023-01-10 RX ORDER — ONDANSETRON 2 MG/ML
4 INJECTION INTRAMUSCULAR; INTRAVENOUS ONCE AS NEEDED
Status: DISCONTINUED | OUTPATIENT
Start: 2023-01-10 | End: 2023-01-10 | Stop reason: HOSPADM

## 2023-01-10 RX ORDER — SODIUM CHLORIDE 0.9 % (FLUSH) 0.9 %
10 SYRINGE (ML) INJECTION AS NEEDED
Status: DISCONTINUED | OUTPATIENT
Start: 2023-01-10 | End: 2023-01-10 | Stop reason: HOSPADM

## 2023-01-10 RX ORDER — PROPOFOL 10 MG/ML
VIAL (ML) INTRAVENOUS AS NEEDED
Status: DISCONTINUED | OUTPATIENT
Start: 2023-01-10 | End: 2023-01-10 | Stop reason: SURG

## 2023-01-10 RX ADMIN — LIDOCAINE HYDROCHLORIDE 50 MG: 20 INJECTION, SOLUTION EPIDURAL; INFILTRATION; INTRACAUDAL; PERINEURAL at 08:03

## 2023-01-10 RX ADMIN — SODIUM CHLORIDE 500 ML: 9 INJECTION, SOLUTION INTRAVENOUS at 07:13

## 2023-01-10 RX ADMIN — PROPOFOL INJECTABLE EMULSION 350 MG: 10 INJECTION, EMULSION INTRAVENOUS at 08:03

## 2023-01-10 NOTE — ANESTHESIA PREPROCEDURE EVALUATION
Anesthesia Evaluation     Patient summary reviewed   no history of anesthetic complications:  NPO Solid Status: > 8 hours             Airway   Mallampati: II  Dental      Pulmonary - negative pulmonary ROS   Cardiovascular   Exercise tolerance: excellent (>7 METS)    (+) hypertension, valvular problems/murmurs (s/p MVR), CAD, cardiac stents (2021) dysrhythmias Atrial Fib, PVC, CHF , hyperlipidemia,   (-) angina    ROS comment: Loop recorrder      Neuro/Psych  (+) CVA,    GI/Hepatic/Renal/Endo    (+)  GERD,      Musculoskeletal     Abdominal    Substance History      OB/GYN          Other                        Anesthesia Plan    ASA 3     MAC       Anesthetic plan, risks, benefits, and alternatives have been provided, discussed and informed consent has been obtained with: patient.        CODE STATUS:

## 2023-01-10 NOTE — ANESTHESIA POSTPROCEDURE EVALUATION
Patient: Robert Piedra    Procedure Summary     Date: 01/10/23 Room / Location: [facility] ENDOSCOPY 4 / [facility] ENDOSCOPY    Anesthesia Start: 0801 Anesthesia Stop: 0827    Procedure: COLONOSCOPY WITH ANESTHESIA Diagnosis:       Encounter for screening for malignant neoplasm of colon      (Encounter for screening for malignant neoplasm of colon [Z12.11])    Surgeons: Watson Smalls MD Provider: Juan Anaya CRNA    Anesthesia Type: MAC ASA Status: 3          Anesthesia Type: MAC    Vitals  No vitals data found for the desired time range.          Post Anesthesia Care and Evaluation    Patient location during evaluation: PHASE II  Patient participation: complete - patient participated  Level of consciousness: awake and alert  Pain score: 0  Pain management: adequate    Airway patency: patent  Anesthetic complications: No anesthetic complications  PONV Status: none  Cardiovascular status: acceptable and blood pressure returned to baseline  Respiratory status: acceptable  Hydration status: acceptable    Comments: Patient discharged from PACU based on Mahamed score >8  No anesthesia care post op
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
## 2023-01-11 LAB
CYTO UR: NORMAL
LAB AP CASE REPORT: NORMAL
Lab: NORMAL
PATH REPORT.FINAL DX SPEC: NORMAL
PATH REPORT.GROSS SPEC: NORMAL

## 2023-01-24 ENCOUNTER — OFFICE VISIT (OUTPATIENT)
Dept: NEUROLOGY | Facility: CLINIC | Age: 58
End: 2023-01-24
Payer: COMMERCIAL

## 2023-01-24 VITALS
DIASTOLIC BLOOD PRESSURE: 81 MMHG | OXYGEN SATURATION: 98 % | SYSTOLIC BLOOD PRESSURE: 124 MMHG | BODY MASS INDEX: 28.1 KG/M2 | HEIGHT: 77 IN | HEART RATE: 72 BPM | WEIGHT: 238 LBS

## 2023-01-24 DIAGNOSIS — I10 ESSENTIAL HYPERTENSION: ICD-10-CM

## 2023-01-24 DIAGNOSIS — E78.2 MIXED HYPERLIPIDEMIA: ICD-10-CM

## 2023-01-24 DIAGNOSIS — Z86.73 HISTORY OF STROKE WITHOUT RESIDUAL DEFICITS: Primary | ICD-10-CM

## 2023-01-24 DIAGNOSIS — I48.0 PAROXYSMAL ATRIAL FIBRILLATION: ICD-10-CM

## 2023-01-24 PROBLEM — I82.461 ACUTE DEEP VEIN THROMBOSIS (DVT) OF CALF MUSCLE VEIN OF RIGHT LOWER EXTREMITY: Status: ACTIVE | Noted: 2021-12-01

## 2023-01-24 PROBLEM — Z95.828 PRESENCE OF IVC FILTER: Status: ACTIVE | Noted: 2021-12-01

## 2023-01-24 PROBLEM — T82.538S: Status: ACTIVE | Noted: 2022-01-13

## 2023-01-24 PROCEDURE — 99213 OFFICE O/P EST LOW 20 MIN: CPT | Performed by: NURSE PRACTITIONER
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
## 2023-02-06 ENCOUNTER — OFFICE VISIT (OUTPATIENT)
Dept: FAMILY MEDICINE CLINIC | Facility: CLINIC | Age: 58
End: 2023-02-06
Payer: COMMERCIAL

## 2023-02-06 VITALS
DIASTOLIC BLOOD PRESSURE: 78 MMHG | HEART RATE: 78 BPM | WEIGHT: 239 LBS | HEIGHT: 77 IN | SYSTOLIC BLOOD PRESSURE: 134 MMHG | OXYGEN SATURATION: 98 % | BODY MASS INDEX: 28.22 KG/M2

## 2023-02-06 DIAGNOSIS — Z00.00 WELLNESS EXAMINATION: Primary | ICD-10-CM

## 2023-02-06 PROCEDURE — 99396 PREV VISIT EST AGE 40-64: CPT | Performed by: FAMILY MEDICINE
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
## 2023-02-07 ENCOUNTER — TELEPHONE (OUTPATIENT)
Dept: FAMILY MEDICINE CLINIC | Facility: CLINIC | Age: 58
End: 2023-02-07

## 2023-02-07 NOTE — TELEPHONE ENCOUNTER
Caller: YEISON GANNON     Relationship: SELF     Best call back number:   [phone] (Mobile)         What is the best time to reach you:ANYTIME   Who are you requesting to speak with (clinical staff, provider,  specific staff member): CLINICAL     Do you know the name of the person who called: CLINICAL     What was the call regarding: STATES HE IS REQUESTING A CALL BACK TO BE ADVISED IF DR XIE WILL FILL OUT AN ADM FOR 2 MEDICAL DOCUMENT CLEARANCES     HE IS ALSO ASKING FOR AN UPDATE ABOUT  PAPER WORK     Do you require a callback: YES

## 2023-02-10 ENCOUNTER — TELEPHONE (OUTPATIENT)
Dept: FAMILY MEDICINE CLINIC | Facility: CLINIC | Age: 58
End: 2023-02-10
Payer: COMMERCIAL

## 2023-02-13 ENCOUNTER — TELEPHONE (OUTPATIENT)
Dept: FAMILY MEDICINE CLINIC | Facility: CLINIC | Age: 58
End: 2023-02-13

## 2023-02-13 NOTE — TELEPHONE ENCOUNTER
Caller: Robert Piedra    Relationship: Self    Best call back number: [phone]    What is the best time to reach you: ANY    Who are you requesting to speak with (clinical staff, provider,  specific staff member): CLINICAL    What was the call regarding:     PATIENT WOULD LIKE TO CHECK ON THE STATUS OF PAPERWORK. PLEASE ADVISE.     Do you require a callback: YES

## 2023-03-17 RX ORDER — ATORVASTATIN CALCIUM 80 MG/1
80 TABLET, FILM COATED ORAL
Qty: 90 TABLET | Refills: 1 | Status: SHIPPED | OUTPATIENT
Start: 2023-03-17

## 2023-03-17 RX ORDER — ATORVASTATIN CALCIUM 80 MG/1
80 TABLET, FILM COATED ORAL
Qty: 90 TABLET | Refills: 3 | Status: CANCELLED | OUTPATIENT
Start: 2023-03-17

## 2023-06-12 RX ORDER — ATORVASTATIN CALCIUM 80 MG/1
TABLET, FILM COATED ORAL
Qty: 90 TABLET | Refills: 1 | Status: SHIPPED | OUTPATIENT
Start: 2023-06-12

## 2023-07-17 NOTE — PROGRESS NOTES
Telephone: Patient called the office reporting still having fever, he had previously declined Covid testing-he is agreeable at this point for Covid testing.  Order placed.   Detail Level: Detailed Show Applicator Variable?: Yes Consent: The patient's consent was obtained including but not limited to risks of crusting, scabbing, blistering, scarring, darker or lighter pigmentary change, recurrence, incomplete removal and infection. Render Note In Bullet Format When Appropriate: No Application Tool (Optional): Cry-AC Number Of Freeze-Thaw Cycles: 2 freeze-thaw cycles Post-Care Instructions: I reviewed with the patient in detail post-care instructions. Patient is to wear sunprotection, and avoid picking at any of the treated lesions. Pt may apply Vaseline to crusted or scabbing areas. Duration Of Freeze Thaw-Cycle (Seconds): 0

## 2023-08-07 ENCOUNTER — OFFICE VISIT (OUTPATIENT)
Dept: FAMILY MEDICINE CLINIC | Facility: CLINIC | Age: 58
End: 2023-08-07
Payer: COMMERCIAL

## 2023-08-07 VITALS
TEMPERATURE: 97.3 F | BODY MASS INDEX: 29.05 KG/M2 | OXYGEN SATURATION: 97 % | WEIGHT: 246 LBS | HEART RATE: 67 BPM | DIASTOLIC BLOOD PRESSURE: 76 MMHG | SYSTOLIC BLOOD PRESSURE: 124 MMHG | HEIGHT: 77 IN

## 2023-08-07 DIAGNOSIS — I25.10 CORONARY ARTERY DISEASE INVOLVING NATIVE CORONARY ARTERY OF NATIVE HEART WITHOUT ANGINA PECTORIS: Primary | ICD-10-CM

## 2023-08-07 DIAGNOSIS — E78.2 MIXED HYPERLIPIDEMIA: ICD-10-CM

## 2023-08-07 DIAGNOSIS — I10 PRIMARY HYPERTENSION: ICD-10-CM

## 2023-08-07 DIAGNOSIS — N40.1 BENIGN PROSTATIC HYPERPLASIA WITH LOWER URINARY TRACT SYMPTOMS, SYMPTOM DETAILS UNSPECIFIED: ICD-10-CM

## 2023-08-07 PROCEDURE — 99214 OFFICE O/P EST MOD 30 MIN: CPT | Performed by: FAMILY MEDICINE
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
## 2023-08-08 LAB
ALBUMIN SERPL-MCNC: 4.1 G/DL (ref 3.5–5.2)
ALBUMIN/GLOB SERPL: 1.7 G/DL
ALP SERPL-CCNC: 88 U/L (ref 39–117)
ALT SERPL-CCNC: 19 U/L (ref 1–41)
AST SERPL-CCNC: 17 U/L (ref 1–40)
BASOPHILS # BLD AUTO: 0.01 10*3/MM3 (ref 0–0.2)
BASOPHILS NFR BLD AUTO: 0.2 % (ref 0–1.5)
BILIRUB SERPL-MCNC: 0.5 MG/DL (ref 0–1.2)
BUN SERPL-MCNC: 18 MG/DL (ref 6–20)
BUN/CREAT SERPL: 15.8 (ref 7–25)
CALCIUM SERPL-MCNC: 9.9 MG/DL (ref 8.6–10.5)
CHLORIDE SERPL-SCNC: 104 MMOL/L (ref 98–107)
CHOLEST SERPL-MCNC: 148 MG/DL (ref 0–200)
CHOLEST/HDLC SERPL: 2.9 {RATIO}
CO2 SERPL-SCNC: 27.3 MMOL/L (ref 22–29)
CREAT SERPL-MCNC: 1.14 MG/DL (ref 0.76–1.27)
EGFRCR SERPLBLD CKD-EPI 2021: 74.1 ML/MIN/1.73
EOSINOPHIL # BLD AUTO: 0.19 10*3/MM3 (ref 0–0.4)
EOSINOPHIL NFR BLD AUTO: 3.4 % (ref 0.3–6.2)
ERYTHROCYTE [DISTWIDTH] IN BLOOD BY AUTOMATED COUNT: 12 % (ref 12.3–15.4)
GLOBULIN SER CALC-MCNC: 2.4 GM/DL
GLUCOSE SERPL-MCNC: 95 MG/DL (ref 65–99)
HBA1C MFR BLD: 5.6 % (ref 4.8–5.6)
HCT VFR BLD AUTO: 41.6 % (ref 37.5–51)
HDLC SERPL-MCNC: 51 MG/DL (ref 40–60)
HGB BLD-MCNC: 14.1 G/DL (ref 13–17.7)
IMM GRANULOCYTES # BLD AUTO: 0.01 10*3/MM3 (ref 0–0.05)
IMM GRANULOCYTES NFR BLD AUTO: 0.2 % (ref 0–0.5)
LDLC SERPL CALC-MCNC: 84 MG/DL (ref 0–100)
LYMPHOCYTES # BLD AUTO: 1.77 10*3/MM3 (ref 0.7–3.1)
LYMPHOCYTES NFR BLD AUTO: 31.9 % (ref 19.6–45.3)
MCH RBC QN AUTO: 31.1 PG (ref 26.6–33)
MCHC RBC AUTO-ENTMCNC: 33.9 G/DL (ref 31.5–35.7)
MCV RBC AUTO: 91.6 FL (ref 79–97)
MONOCYTES # BLD AUTO: 0.52 10*3/MM3 (ref 0.1–0.9)
MONOCYTES NFR BLD AUTO: 9.4 % (ref 5–12)
NEUTROPHILS # BLD AUTO: 3.05 10*3/MM3 (ref 1.7–7)
NEUTROPHILS NFR BLD AUTO: 54.9 % (ref 42.7–76)
NRBC BLD AUTO-RTO: 0 /100 WBC (ref 0–0.2)
PLATELET # BLD AUTO: 172 10*3/MM3 (ref 140–450)
POTASSIUM SERPL-SCNC: 4.8 MMOL/L (ref 3.5–5.2)
PROT SERPL-MCNC: 6.5 G/DL (ref 6–8.5)
RBC # BLD AUTO: 4.54 10*6/MM3 (ref 4.14–5.8)
SODIUM SERPL-SCNC: 141 MMOL/L (ref 136–145)
TRIGL SERPL-MCNC: 65 MG/DL (ref 0–150)
VLDLC SERPL CALC-MCNC: 13 MG/DL (ref 5–40)
WBC # BLD AUTO: 5.55 10*3/MM3 (ref 3.4–10.8)

## 2023-08-09 ENCOUNTER — TELEPHONE (OUTPATIENT)
Dept: NEUROLOGY | Facility: CLINIC | Age: 58
End: 2023-08-09
Payer: COMMERCIAL

## 2023-08-09 ENCOUNTER — TELEPHONE (OUTPATIENT)
Dept: CARDIOLOGY | Facility: CLINIC | Age: 58
End: 2023-08-09

## 2023-08-09 NOTE — TELEPHONE ENCOUNTER
Caller: OhRobert     Relationship: Self     Best call back number: [phone]     What form or medical record are you requesting: OFFICE NOTE FROM 1-24-23    Who is requesting this form or medical record from you: [employer] [place]     How would you like to receive the form or medical records (pick-up, mail, fax): FAX  If fax, what is the fax number: [phone]  ATTN: MARIAH FLOOD        Timeframe paperwork needed: ASAP     Additional notes: PATIENT NEEDS THIS FOR A NEW JOB HE IS GETTING SO HE REQUESTED WE TRY TO [employer] IT, THANKS

## 2023-08-09 NOTE — TELEPHONE ENCOUNTER
Caller: Oh Robert    Relationship: Self    Best call back number: [phone]    What form or medical record are you requesting: OFFICE NOTE AND ANY OTHER TREATMENT DONE JUNE 29TH, 2023    Who is requesting this form or medical record from you: [employer] [place]    How would you like to receive the form or medical records (pick-up, mail, fax): FAX  If fax, what is the fax number: [phone]  ATTN: MARIAH FLOOD      Timeframe paperwork needed: ASAP    Additional notes: PATIENT NEEDS THIS FOR A NEW JOB HE IS GETTING SO HE REQUESTED WE TRY TO [employer] IT, THANKS

## 2023-10-09 ENCOUNTER — HOSPITAL ENCOUNTER (OUTPATIENT)
Dept: CARDIOLOGY | Facility: HOSPITAL | Age: 58
Discharge: HOME | End: 2023-10-09
Payer: COMMERCIAL

## 2023-10-09 VITALS
DIASTOLIC BLOOD PRESSURE: 76 MMHG | WEIGHT: 246 LBS | HEIGHT: 77 IN | BODY MASS INDEX: 29.05 KG/M2 | SYSTOLIC BLOOD PRESSURE: 124 MMHG

## 2023-10-09 DIAGNOSIS — Z98.890 S/P MVR (MITRAL VALVE REPAIR): ICD-10-CM

## 2023-10-09 PROCEDURE — 93306 TTE W/DOPPLER COMPLETE: CPT

## 2023-10-13 LAB
BH CV ECHO MEAS - AO MAX PG: 3.5 MMHG
BH CV ECHO MEAS - AO MEAN PG: 2 MMHG
BH CV ECHO MEAS - AO ROOT DIAM: 3.6 CM
BH CV ECHO MEAS - AO V2 MAX: 93.3 CM/SEC
BH CV ECHO MEAS - AO V2 VTI: 17.5 CM
BH CV ECHO MEAS - AVA(I,D): 4.5 CM2
BH CV ECHO MEAS - EDV(CUBED): 227 ML
BH CV ECHO MEAS - EDV(MOD-SP2): 141 ML
BH CV ECHO MEAS - EDV(MOD-SP4): 132 ML
BH CV ECHO MEAS - EF(MOD-BP): 44 %
BH CV ECHO MEAS - EF(MOD-SP2): 45.4 %
BH CV ECHO MEAS - EF(MOD-SP4): 41.7 %
BH CV ECHO MEAS - ESV(CUBED): 103.8 ML
BH CV ECHO MEAS - ESV(MOD-SP2): 77 ML
BH CV ECHO MEAS - ESV(MOD-SP4): 77 ML
BH CV ECHO MEAS - FS: 23 %
BH CV ECHO MEAS - IVS/LVPW: 0.92 CM
BH CV ECHO MEAS - IVSD: 1.2 CM
BH CV ECHO MEAS - LA DIMENSION: 5.1 CM
BH CV ECHO MEAS - LV DIASTOLIC VOL/BSA (35-75): 54 CM2
BH CV ECHO MEAS - LV MASS(C)D: 341 GRAMS
BH CV ECHO MEAS - LV MAX PG: 2 MMHG
BH CV ECHO MEAS - LV MEAN PG: 2 MMHG
BH CV ECHO MEAS - LV SYSTOLIC VOL/BSA (12-30): 31.5 CM2
BH CV ECHO MEAS - LV V1 MAX: 70.7 CM/SEC
BH CV ECHO MEAS - LV V1 VTI: 16.2 CM
BH CV ECHO MEAS - LVIDD: 6.1 CM
BH CV ECHO MEAS - LVIDS: 4.7 CM
BH CV ECHO MEAS - LVOT AREA: 4.9 CM2
BH CV ECHO MEAS - LVOT DIAM: 2.5 CM
BH CV ECHO MEAS - LVPWD: 1.3 CM
BH CV ECHO MEAS - MR MAX PG: 25 MMHG
BH CV ECHO MEAS - MR MAX VEL: 250 CM/SEC
BH CV ECHO MEAS - MV A MAX VEL: 98.7 CM/SEC
BH CV ECHO MEAS - MV DEC TIME: 0.3 SEC
BH CV ECHO MEAS - MV E MAX VEL: 158 CM/SEC
BH CV ECHO MEAS - MV E/A: 1.6
BH CV ECHO MEAS - PA V2 MAX: 55.7 CM/SEC
BH CV ECHO MEAS - SI(MOD-SP2): 26.2 ML/M2
BH CV ECHO MEAS - SI(MOD-SP4): 22.5 ML/M2
BH CV ECHO MEAS - SV(LVOT): 79.5 ML
BH CV ECHO MEAS - SV(MOD-SP2): 64 ML
BH CV ECHO MEAS - SV(MOD-SP4): 55 ML
LEFT ATRIUM VOLUME INDEX: 38.9 ML/M2
LEFT ATRIUM VOLUME: 95 ML
LV EF 2D ECHO EST: 50 %

## 2023-11-12 DIAGNOSIS — N13.8 BENIGN PROSTATIC HYPERPLASIA WITH URINARY OBSTRUCTION: ICD-10-CM

## 2023-11-12 DIAGNOSIS — N40.1 BENIGN PROSTATIC HYPERPLASIA WITH URINARY OBSTRUCTION: ICD-10-CM

## 2023-11-13 RX ORDER — TAMSULOSIN HYDROCHLORIDE 0.4 MG/1
1 CAPSULE ORAL DAILY
Qty: 90 CAPSULE | Refills: 3 | Status: SHIPPED | OUTPATIENT
Start: 2023-11-13
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
## 2023-12-06 ENCOUNTER — OFFICE VISIT (OUTPATIENT)
Dept: UROLOGY | Facility: CLINIC | Age: 58
End: 2023-12-06
Payer: COMMERCIAL

## 2023-12-06 VITALS — BODY MASS INDEX: 28.83 KG/M2 | WEIGHT: 244.2 LBS | TEMPERATURE: 97 F | HEIGHT: 77 IN

## 2023-12-06 DIAGNOSIS — N40.1 BPH WITH URINARY OBSTRUCTION: Primary | ICD-10-CM

## 2023-12-06 DIAGNOSIS — R97.20 ELEVATED PSA: ICD-10-CM

## 2023-12-06 DIAGNOSIS — N52.9 IMPOTENCE OF ORGANIC ORIGIN: ICD-10-CM

## 2023-12-06 DIAGNOSIS — R33.9 RETENTION, URINE: ICD-10-CM

## 2023-12-06 DIAGNOSIS — N13.8 BPH WITH URINARY OBSTRUCTION: Primary | ICD-10-CM

## 2023-12-06 LAB
BILIRUB BLD-MCNC: NEGATIVE MG/DL
CLARITY, POC: CLEAR
COLOR UR: YELLOW
GLUCOSE UR STRIP-MCNC: NEGATIVE MG/DL
KETONES UR QL: NEGATIVE
LEUKOCYTE EST, POC: ABNORMAL
NITRITE UR-MCNC: NEGATIVE MG/ML
PH UR: 5.5 [PH] (ref 5–8)
PROT UR STRIP-MCNC: ABNORMAL MG/DL
RBC # UR STRIP: NEGATIVE /UL
SP GR UR: 1.03 (ref 1–1.03)
UROBILINOGEN UR QL: ABNORMAL

## 2024-01-24 ENCOUNTER — OFFICE VISIT (OUTPATIENT)
Dept: NEUROLOGY | Facility: CLINIC | Age: 59
End: 2024-01-24
Payer: COMMERCIAL

## 2024-01-24 VITALS
SYSTOLIC BLOOD PRESSURE: 124 MMHG | BODY MASS INDEX: 28.81 KG/M2 | RESPIRATION RATE: 18 BRPM | HEART RATE: 82 BPM | WEIGHT: 244 LBS | HEIGHT: 77 IN | DIASTOLIC BLOOD PRESSURE: 74 MMHG

## 2024-01-24 DIAGNOSIS — I48.0 PAROXYSMAL ATRIAL FIBRILLATION: ICD-10-CM

## 2024-01-24 DIAGNOSIS — E78.2 MIXED HYPERLIPIDEMIA: ICD-10-CM

## 2024-01-24 DIAGNOSIS — I10 ESSENTIAL HYPERTENSION: ICD-10-CM

## 2024-01-24 DIAGNOSIS — Z86.73 HISTORY OF STROKE WITHOUT RESIDUAL DEFICITS: Primary | ICD-10-CM

## 2024-01-24 PROCEDURE — 99213 OFFICE O/P EST LOW 20 MIN: CPT | Performed by: NURSE PRACTITIONER
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
## 2024-02-12 ENCOUNTER — OFFICE VISIT (OUTPATIENT)
Dept: FAMILY MEDICINE CLINIC | Facility: CLINIC | Age: 59
End: 2024-02-12
Payer: COMMERCIAL

## 2024-02-12 VITALS
BODY MASS INDEX: 29.28 KG/M2 | HEART RATE: 76 BPM | OXYGEN SATURATION: 98 % | SYSTOLIC BLOOD PRESSURE: 124 MMHG | HEIGHT: 77 IN | WEIGHT: 248 LBS | DIASTOLIC BLOOD PRESSURE: 72 MMHG | TEMPERATURE: 97.5 F

## 2024-02-12 DIAGNOSIS — I25.10 CORONARY ARTERY DISEASE INVOLVING NATIVE CORONARY ARTERY OF NATIVE HEART WITHOUT ANGINA PECTORIS: Primary | ICD-10-CM

## 2024-02-12 DIAGNOSIS — E78.2 MIXED HYPERLIPIDEMIA: ICD-10-CM

## 2024-02-12 DIAGNOSIS — R97.20 ELEVATED PSA: ICD-10-CM

## 2024-02-12 DIAGNOSIS — N40.1 BENIGN PROSTATIC HYPERPLASIA WITH LOWER URINARY TRACT SYMPTOMS, SYMPTOM DETAILS UNSPECIFIED: ICD-10-CM

## 2024-02-12 DIAGNOSIS — I10 PRIMARY HYPERTENSION: ICD-10-CM

## 2024-02-12 PROCEDURE — 99214 OFFICE O/P EST MOD 30 MIN: CPT | Performed by: FAMILY MEDICINE

## 2024-02-22 ENCOUNTER — LAB (OUTPATIENT)
Dept: LAB | Facility: HOSPITAL | Age: 59
End: 2024-02-22
Payer: COMMERCIAL

## 2024-02-22 DIAGNOSIS — R97.20 ELEVATED PSA: ICD-10-CM

## 2024-02-22 LAB
ALBUMIN SERPL-MCNC: 4.1 G/DL (ref 3.5–5.2)
ALBUMIN/GLOB SERPL: 1.4 G/DL
ALP SERPL-CCNC: 85 U/L (ref 39–117)
ALT SERPL W P-5'-P-CCNC: 16 U/L (ref 1–41)
ANION GAP SERPL CALCULATED.3IONS-SCNC: 12 MMOL/L (ref 5–15)
AST SERPL-CCNC: 18 U/L (ref 1–40)
BASOPHILS # BLD AUTO: 0.01 10*3/MM3 (ref 0–0.2)
BASOPHILS NFR BLD AUTO: 0.2 % (ref 0–1.5)
BILIRUB SERPL-MCNC: 1 MG/DL (ref 0–1.2)
BUN SERPL-MCNC: 23 MG/DL (ref 6–20)
BUN/CREAT SERPL: 24 (ref 7–25)
CALCIUM SPEC-SCNC: 8.9 MG/DL (ref 8.6–10.5)
CHLORIDE SERPL-SCNC: 105 MMOL/L (ref 98–107)
CHOLEST SERPL-MCNC: 162 MG/DL (ref 0–200)
CO2 SERPL-SCNC: 24 MMOL/L (ref 22–29)
CREAT SERPL-MCNC: 0.96 MG/DL (ref 0.76–1.27)
DEPRECATED RDW RBC AUTO: 40.7 FL (ref 37–54)
EGFRCR SERPLBLD CKD-EPI 2021: 91.1 ML/MIN/1.73
EOSINOPHIL # BLD AUTO: 0.1 10*3/MM3 (ref 0–0.4)
EOSINOPHIL NFR BLD AUTO: 1.8 % (ref 0.3–6.2)
ERYTHROCYTE [DISTWIDTH] IN BLOOD BY AUTOMATED COUNT: 12.2 % (ref 12.3–15.4)
GLOBULIN UR ELPH-MCNC: 3 GM/DL
GLUCOSE SERPL-MCNC: 106 MG/DL (ref 65–99)
HBA1C MFR BLD: 5.7 % (ref 4.8–5.6)
HCT VFR BLD AUTO: 42.7 % (ref 37.5–51)
HDLC SERPL QL: 3
HDLC SERPL-MCNC: 54 MG/DL (ref 40–60)
HGB BLD-MCNC: 14.8 G/DL (ref 13–17.7)
IMM GRANULOCYTES # BLD AUTO: 0.02 10*3/MM3 (ref 0–0.05)
IMM GRANULOCYTES NFR BLD AUTO: 0.4 % (ref 0–0.5)
LDLC SERPL CALC-MCNC: 93 MG/DL (ref 0–100)
LYMPHOCYTES # BLD AUTO: 1.65 10*3/MM3 (ref 0.7–3.1)
LYMPHOCYTES NFR BLD AUTO: 30.1 % (ref 19.6–45.3)
MCH RBC QN AUTO: 31.4 PG (ref 26.6–33)
MCHC RBC AUTO-ENTMCNC: 34.7 G/DL (ref 31.5–35.7)
MCV RBC AUTO: 90.5 FL (ref 79–97)
MONOCYTES # BLD AUTO: 0.58 10*3/MM3 (ref 0.1–0.9)
MONOCYTES NFR BLD AUTO: 10.6 % (ref 5–12)
NEUTROPHILS NFR BLD AUTO: 3.13 10*3/MM3 (ref 1.7–7)
NEUTROPHILS NFR BLD AUTO: 56.9 % (ref 42.7–76)
NRBC BLD AUTO-RTO: 0 /100 WBC (ref 0–0.2)
PLATELET # BLD AUTO: 191 10*3/MM3 (ref 140–450)
PMV BLD AUTO: 12.2 FL (ref 6–12)
POTASSIUM SERPL-SCNC: 4.1 MMOL/L (ref 3.5–5.2)
PROT SERPL-MCNC: 7.1 G/DL (ref 6–8.5)
PSA SERPL-MCNC: 11.3 NG/ML (ref 0–4)
RBC # BLD AUTO: 4.72 10*6/MM3 (ref 4.14–5.8)
SODIUM SERPL-SCNC: 141 MMOL/L (ref 136–145)
TRIGL SERPL-MCNC: 76 MG/DL (ref 0–150)
VLDLC SERPL-MCNC: 15 MG/DL (ref 5–40)
WBC NRBC COR # BLD AUTO: 5.49 10*3/MM3 (ref 3.4–10.8)

## 2024-02-22 PROCEDURE — 83036 HEMOGLOBIN GLYCOSYLATED A1C: CPT | Performed by: FAMILY MEDICINE

## 2024-02-22 PROCEDURE — 80053 COMPREHEN METABOLIC PANEL: CPT | Performed by: FAMILY MEDICINE

## 2024-02-22 PROCEDURE — 85025 COMPLETE CBC W/AUTO DIFF WBC: CPT | Performed by: FAMILY MEDICINE

## 2024-02-22 PROCEDURE — 80061 LIPID PANEL: CPT | Performed by: FAMILY MEDICINE

## 2024-02-22 PROCEDURE — 84153 ASSAY OF PSA TOTAL: CPT

## 2024-02-23 ENCOUNTER — TELEPHONE (OUTPATIENT)
Dept: UROLOGY | Facility: CLINIC | Age: 59
End: 2024-02-23
Payer: COMMERCIAL

## 2024-02-23 DIAGNOSIS — N40.1 BPH WITH URINARY OBSTRUCTION: Primary | ICD-10-CM

## 2024-02-23 DIAGNOSIS — N13.8 BPH WITH URINARY OBSTRUCTION: Primary | ICD-10-CM

## 2024-02-23 NOTE — TELEPHONE ENCOUNTER
Patient called in wanting to see if he could start back on Tamsulosin. Patient said they discontinued because patient felt he didn't need it.. but now that patient has been off of it he thinks it was doing him some good. I let him know I would get a message over to Dr. Cota's nurse.     Patient verbalized understanding.

## 2024-02-27 DIAGNOSIS — N13.8 BPH WITH URINARY OBSTRUCTION: Primary | ICD-10-CM

## 2024-02-27 DIAGNOSIS — N40.1 BPH WITH URINARY OBSTRUCTION: Primary | ICD-10-CM

## 2024-02-27 RX ORDER — TAMSULOSIN HYDROCHLORIDE 0.4 MG/1
1 CAPSULE ORAL DAILY
Qty: 90 CAPSULE | Refills: 3 | Status: SHIPPED | OUTPATIENT
Start: 2024-02-27
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
## 2024-03-06 ENCOUNTER — OFFICE VISIT (OUTPATIENT)
Dept: UROLOGY | Facility: CLINIC | Age: 59
End: 2024-03-06
Payer: COMMERCIAL

## 2024-03-06 VITALS — BODY MASS INDEX: 29.24 KG/M2 | WEIGHT: 247.6 LBS | HEIGHT: 77 IN | TEMPERATURE: 98 F

## 2024-03-06 DIAGNOSIS — R33.9 INCOMPLETE EMPTYING OF BLADDER: ICD-10-CM

## 2024-03-06 DIAGNOSIS — R97.20 ELEVATED PSA: ICD-10-CM

## 2024-03-06 DIAGNOSIS — N13.8 BPH WITH URINARY OBSTRUCTION: Primary | ICD-10-CM

## 2024-03-06 DIAGNOSIS — N40.1 BPH WITH URINARY OBSTRUCTION: Primary | ICD-10-CM

## 2024-03-06 DIAGNOSIS — N52.9 IMPOTENCE OF ORGANIC ORIGIN: ICD-10-CM

## 2024-03-06 LAB
BILIRUB BLD-MCNC: NEGATIVE MG/DL
CLARITY, POC: ABNORMAL
COLOR UR: YELLOW
GLUCOSE UR STRIP-MCNC: NEGATIVE MG/DL
KETONES UR QL: NEGATIVE
LEUKOCYTE EST, POC: ABNORMAL
NITRITE UR-MCNC: POSITIVE MG/ML
PH UR: 5.5 [PH] (ref 5–8)
PROT UR STRIP-MCNC: ABNORMAL MG/DL
RBC # UR STRIP: ABNORMAL /UL
SP GR UR: 1.03 (ref 1–1.03)
UROBILINOGEN UR QL: ABNORMAL

## 2024-03-12 ENCOUNTER — TELEPHONE (OUTPATIENT)
Dept: CARDIOLOGY | Facility: CLINIC | Age: 59
End: 2024-03-12
Payer: COMMERCIAL

## 2024-03-12 NOTE — TELEPHONE ENCOUNTER
Caller: Robert Piedra    Relationship to patient: Self    Best call back number: [phone] (home)        Type of visit: FOLLOW UP     Requested date: SOONEST AVAILABLE      If rescheduling, when is the original appointment: 3/14/24     Additional notes:PATIENT WAS RESCHEDULED FOR NEXT AVAILABLE ON 5/6/24. PLEASE CALL PATIENT IF SOONER APPOINTMENT AVAILABLE.PATIENT IS OK WITH [employer] MESSAGE.

## 2024-03-14 RX ORDER — ATORVASTATIN CALCIUM 80 MG/1
80 TABLET, FILM COATED ORAL
Qty: 90 TABLET | Refills: 1 | Status: SHIPPED | OUTPATIENT
Start: 2024-03-14

## 2024-05-06 ENCOUNTER — OFFICE VISIT (OUTPATIENT)
Dept: CARDIOLOGY | Facility: CLINIC | Age: 59
End: 2024-05-06
Payer: COMMERCIAL

## 2024-05-06 VITALS
HEIGHT: 77 IN | BODY MASS INDEX: 28.93 KG/M2 | WEIGHT: 245 LBS | DIASTOLIC BLOOD PRESSURE: 88 MMHG | HEART RATE: 86 BPM | SYSTOLIC BLOOD PRESSURE: 116 MMHG | OXYGEN SATURATION: 98 %

## 2024-05-06 DIAGNOSIS — I10 PRIMARY HYPERTENSION: ICD-10-CM

## 2024-05-06 DIAGNOSIS — Z98.890 S/P MVR (MITRAL VALVE REPAIR): Primary | ICD-10-CM

## 2024-05-06 DIAGNOSIS — I25.10 CORONARY ARTERY DISEASE INVOLVING NATIVE CORONARY ARTERY OF NATIVE HEART WITHOUT ANGINA PECTORIS: ICD-10-CM

## 2024-05-06 DIAGNOSIS — I50.22 CHRONIC SYSTOLIC CONGESTIVE HEART FAILURE: ICD-10-CM

## 2024-05-06 DIAGNOSIS — I48.19 PERSISTENT ATRIAL FIBRILLATION: ICD-10-CM

## 2024-05-06 RX ORDER — FUROSEMIDE 20 MG/1
20 TABLET ORAL AS NEEDED
COMMUNITY

## 2024-06-07 ENCOUNTER — TELEPHONE (OUTPATIENT)
Dept: FAMILY MEDICINE CLINIC | Facility: CLINIC | Age: 59
End: 2024-06-07
Payer: COMMERCIAL

## 2024-06-07 NOTE — TELEPHONE ENCOUNTER
Pt called stating he was needing to be seen today due to having some breathing issues. No appointments were available until Monday morning. Pt stated he could not wait that long. Advise to go to Urgent care or Er. Pt stated he would die before going to the ER.

## 2024-07-08 RX ORDER — METOPROLOL SUCCINATE 25 MG/1
25 TABLET, EXTENDED RELEASE ORAL DAILY
Qty: 90 TABLET | Refills: 3 | Status: SHIPPED | OUTPATIENT
Start: 2024-07-08 | End: 2024-07-11 | Stop reason: SDUPTHER

## 2024-07-11 RX ORDER — METOPROLOL SUCCINATE 25 MG/1
25 TABLET, EXTENDED RELEASE ORAL DAILY
Qty: 90 TABLET | Refills: 3 | Status: SHIPPED | OUTPATIENT
Start: 2024-07-11

## 2024-07-11 NOTE — TELEPHONE ENCOUNTER
Caller: TERESSA    Relationship: SELF    Best call back number: [phone]    Requested Prescriptions:   Requested Prescriptions     Pending Prescriptions Disp Refills    metoprolol succinate XL (TOPROL-XL) 25 MG 24 hr tablet 90 tablet 3     Sig: Take 1 tablet by mouth Daily.        Pharmacy where request should be sent: [facility] #[id number] - [place] - [address] - [phone] [facility] [phone] FX     Last office visit with prescribing clinician: 6/29/2023   Last telemedicine visit with prescribing clinician: Visit date not found   Next office visit with prescribing clinician: 11/27/2024     Additional details provided by patient: 90 DAY SUPPLY. THANK YOU!    Does the patient have less than a 3 day supply:  [] Yes  [x] No    Would you like a call back once the refill request has been completed: [] Yes [] No    If the office needs to give you a call back, can they leave a voicemail: [] Yes [] No    Tanya Gary Rep   07/11/24 11:29 CDT

## 2024-09-04 ENCOUNTER — TELEPHONE (OUTPATIENT)
Dept: NEUROLOGY | Facility: CLINIC | Age: 59
End: 2024-09-04
Payer: COMMERCIAL

## 2024-09-04 RX ORDER — ATORVASTATIN CALCIUM 80 MG/1
80 TABLET, FILM COATED ORAL
Qty: 90 TABLET | Refills: 1 | Status: SHIPPED | OUTPATIENT
Start: 2024-09-04 | End: 2024-09-06 | Stop reason: SDUPTHER

## 2024-09-04 NOTE — TELEPHONE ENCOUNTER
CALLED PATIENT TO MAKE THEM AWARE THAT THE APPOINTMENT THEY HAD WITH [facility] IS NOW CANCELED AS HE IS OFFICIALLY NO LONGER WITH NEUROLOGY. I LET HIM KNOW SINCE HE HAS BEEN STABLE WITH NO CONCERNS AND IS ON A YEAR FOLLOW UP REGIMEN THAT IT HAS BEEN ADVISED HE WILL NEED TO GO BACK TO PCP FOR THEM TO MANAGE HIS CARE. I TOLD HIM IF HE EVER HAS ANY STROKE LIKE SYMPTOMS HE WILL NEED TO CALL 911. PATIENT VOICED UNDERSTANDING.

## 2024-09-06 ENCOUNTER — TELEPHONE (OUTPATIENT)
Dept: FAMILY MEDICINE CLINIC | Facility: CLINIC | Age: 59
End: 2024-09-06
Payer: COMMERCIAL

## 2024-09-06 RX ORDER — ATORVASTATIN CALCIUM 80 MG/1
80 TABLET, FILM COATED ORAL
Qty: 90 TABLET | Refills: 1 | Status: SHIPPED | OUTPATIENT
Start: 2024-09-06

## 2024-09-06 NOTE — TELEPHONE ENCOUNTER
Rx Refill Note  Requested Prescriptions     Pending Prescriptions Disp Refills    atorvastatin (LIPITOR) 80 MG tablet 90 tablet 1     Sig: Take 1 tablet by mouth every night at bedtime.      Last office visit with office: 02/12/2024  Next office visit with office: 09/16/2024    UDS:     DATE OF LAST REFILL: 06/12/2023    Controlled Substance Agreement:     JAMA OR BLANK:          {TIP  Is Refill Pharmacy correct?:  Nancy Swain MA  09/06/24, 11:10 CDT

## 2024-09-06 NOTE — TELEPHONE ENCOUNTER
Caller: Robert Piedra    Relationship: Self    Best call back number:     [phone] (Mobile), REQUESTING A CALLBACK. WHEN THE PRESCRIPTION IS SENT.     Who are you requesting to speak with (clinical staff, provider,  specific staff member): CLINICAL    What was the call regarding: THE PATIENT STATES THE ATORVASTATIN WAS SENT TO THE WRONG PHARMACY. THE PATIENT STATES HE NEEDS IT SENT TO THE [facility].

## 2024-10-08 ENCOUNTER — OFFICE VISIT (OUTPATIENT)
Dept: FAMILY MEDICINE CLINIC | Facility: CLINIC | Age: 59
End: 2024-10-08
Payer: COMMERCIAL

## 2024-10-08 VITALS
BODY MASS INDEX: 29.02 KG/M2 | DIASTOLIC BLOOD PRESSURE: 80 MMHG | HEIGHT: 77 IN | SYSTOLIC BLOOD PRESSURE: 120 MMHG | TEMPERATURE: 98.2 F | OXYGEN SATURATION: 93 % | HEART RATE: 83 BPM | RESPIRATION RATE: 16 BRPM | WEIGHT: 245.8 LBS

## 2024-10-08 DIAGNOSIS — R31.9 HEMATURIA, UNSPECIFIED TYPE: Primary | ICD-10-CM

## 2024-10-08 DIAGNOSIS — L20.9 ATOPIC DERMATITIS, UNSPECIFIED TYPE: ICD-10-CM

## 2024-10-08 DIAGNOSIS — R73.03 PREDIABETES: ICD-10-CM

## 2024-10-08 DIAGNOSIS — I10 PRIMARY HYPERTENSION: ICD-10-CM

## 2024-10-08 DIAGNOSIS — E78.2 MIXED HYPERLIPIDEMIA: ICD-10-CM

## 2024-10-08 PROCEDURE — 99214 OFFICE O/P EST MOD 30 MIN: CPT | Performed by: NURSE PRACTITIONER

## 2024-10-08 RX ORDER — TRIAMCINOLONE ACETONIDE 1 MG/G
1 CREAM TOPICAL 2 TIMES DAILY
Qty: 80 G | Refills: 0 | Status: SHIPPED | OUTPATIENT
Start: 2024-10-08
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
## 2024-10-12 LAB
ALBUMIN SERPL-MCNC: 4.3 G/DL (ref 3.5–5.2)
ALBUMIN/GLOB SERPL: 1.5 G/DL
ALP SERPL-CCNC: 92 U/L (ref 39–117)
ALT SERPL-CCNC: 16 U/L (ref 1–41)
APPEARANCE UR: ABNORMAL
AST SERPL-CCNC: 23 U/L (ref 1–40)
BACTERIA #/AREA URNS HPF: ABNORMAL /[HPF]
BACTERIA UR CULT: ABNORMAL
BACTERIA UR CULT: ABNORMAL
BASOPHILS # BLD AUTO: 0.02 10*3/MM3 (ref 0–0.2)
BASOPHILS NFR BLD AUTO: 0.2 % (ref 0–1.5)
BILIRUB SERPL-MCNC: 0.7 MG/DL (ref 0–1.2)
BILIRUB UR QL STRIP: NEGATIVE
BUN SERPL-MCNC: 15 MG/DL (ref 8–23)
BUN/CREAT SERPL: 15 (ref 7–25)
CALCIUM SERPL-MCNC: 9.7 MG/DL (ref 8.6–10.5)
CASTS URNS QL MICRO: ABNORMAL /LPF
CHLORIDE SERPL-SCNC: 102 MMOL/L (ref 98–107)
CHOLEST SERPL-MCNC: 179 MG/DL (ref 0–200)
CO2 SERPL-SCNC: 22.6 MMOL/L (ref 22–29)
COLOR UR: YELLOW
CREAT SERPL-MCNC: 1 MG/DL (ref 0.76–1.27)
EGFRCR SERPLBLD CKD-EPI 2021: 86.2 ML/MIN/1.73
EOSINOPHIL # BLD AUTO: 0.03 10*3/MM3 (ref 0–0.4)
EOSINOPHIL NFR BLD AUTO: 0.3 % (ref 0.3–6.2)
EPI CELLS #/AREA URNS HPF: ABNORMAL /HPF (ref 0–10)
ERYTHROCYTE [DISTWIDTH] IN BLOOD BY AUTOMATED COUNT: 12.3 % (ref 12.3–15.4)
GLOBULIN SER CALC-MCNC: 2.9 GM/DL
GLUCOSE SERPL-MCNC: 94 MG/DL (ref 65–99)
GLUCOSE UR QL STRIP: NEGATIVE
HBA1C MFR BLD: 5.6 % (ref 4.8–5.6)
HCT VFR BLD AUTO: 46.7 % (ref 37.5–51)
HDLC SERPL-MCNC: 62 MG/DL (ref 40–60)
HGB BLD-MCNC: 15.2 G/DL (ref 13–17.7)
HGB UR QL STRIP: ABNORMAL
IMM GRANULOCYTES # BLD AUTO: 0.04 10*3/MM3 (ref 0–0.05)
IMM GRANULOCYTES NFR BLD AUTO: 0.3 % (ref 0–0.5)
KETONES UR QL STRIP: ABNORMAL
LDLC SERPL CALC-MCNC: 103 MG/DL (ref 0–100)
LEUKOCYTE ESTERASE UR QL STRIP: ABNORMAL
LYMPHOCYTES # BLD AUTO: 1.31 10*3/MM3 (ref 0.7–3.1)
LYMPHOCYTES NFR BLD AUTO: 11.4 % (ref 19.6–45.3)
MCH RBC QN AUTO: 31 PG (ref 26.6–33)
MCHC RBC AUTO-ENTMCNC: 32.5 G/DL (ref 31.5–35.7)
MCV RBC AUTO: 95.1 FL (ref 79–97)
MICRO URNS: ABNORMAL
MONOCYTES # BLD AUTO: 0.55 10*3/MM3 (ref 0.1–0.9)
MONOCYTES NFR BLD AUTO: 4.8 % (ref 5–12)
MUCOUS THREADS URNS QL MICRO: PRESENT
NEUTROPHILS # BLD AUTO: 9.52 10*3/MM3 (ref 1.7–7)
NEUTROPHILS NFR BLD AUTO: 83 % (ref 42.7–76)
NITRITE UR QL STRIP: POSITIVE
NRBC BLD AUTO-RTO: 0 /100 WBC (ref 0–0.2)
OTHER ANTIBIOTIC SUSC ISLT: ABNORMAL
PH UR STRIP: 5.5 [PH] (ref 5–7.5)
PLATELET # BLD AUTO: 213 10*3/MM3 (ref 140–450)
POTASSIUM SERPL-SCNC: 4.6 MMOL/L (ref 3.5–5.2)
PROT SERPL-MCNC: 7.2 G/DL (ref 6–8.5)
PROT UR QL STRIP: ABNORMAL
RBC # BLD AUTO: 4.91 10*6/MM3 (ref 4.14–5.8)
RBC #/AREA URNS HPF: ABNORMAL /HPF (ref 0–2)
SODIUM SERPL-SCNC: 138 MMOL/L (ref 136–145)
SP GR UR STRIP: 1.02 (ref 1–1.03)
TRIGL SERPL-MCNC: 76 MG/DL (ref 0–150)
TSH SERPL DL<=0.005 MIU/L-ACNC: 0.64 UIU/ML (ref 0.27–4.2)
URINALYSIS REFLEX: ABNORMAL
UROBILINOGEN UR STRIP-MCNC: 0.2 MG/DL (ref 0.2–1)
VLDLC SERPL CALC-MCNC: 14 MG/DL (ref 5–40)
WBC # BLD AUTO: 11.47 10*3/MM3 (ref 3.4–10.8)
WBC #/AREA URNS HPF: >30 /HPF (ref 0–5)

## 2024-10-14 RX ORDER — LEVOFLOXACIN 500 MG/1
500 TABLET, FILM COATED ORAL DAILY
Qty: 10 TABLET | Refills: 0 | Status: SHIPPED | OUTPATIENT
Start: 2024-10-14 | End: 2024-10-24

## 2024-10-14 RX ORDER — LEVOFLOXACIN 500 MG/1
500 TABLET, FILM COATED ORAL DAILY
Qty: 10 TABLET | Refills: 0 | Status: SHIPPED | OUTPATIENT
Start: 2024-10-14 | End: 2024-10-14

## 2024-10-14 NOTE — PROGRESS NOTES
Please call result -has UTI-needs to start Levaquin today.    Electronically signed by LUDWIG Chandra, 10/14/24, 12:43 PM CDT.

## 2024-11-25 ENCOUNTER — TELEPHONE (OUTPATIENT)
Dept: UROLOGY | Facility: CLINIC | Age: 59
End: 2024-11-25
Payer: COMMERCIAL

## 2024-12-02 ENCOUNTER — OFFICE VISIT (OUTPATIENT)
Dept: CARDIOLOGY | Facility: CLINIC | Age: 59
End: 2024-12-02
Payer: COMMERCIAL

## 2024-12-02 VITALS
BODY MASS INDEX: 29.45 KG/M2 | HEART RATE: 72 BPM | DIASTOLIC BLOOD PRESSURE: 78 MMHG | OXYGEN SATURATION: 97 % | HEIGHT: 77 IN | SYSTOLIC BLOOD PRESSURE: 118 MMHG | WEIGHT: 249.4 LBS

## 2024-12-02 DIAGNOSIS — I25.10 CORONARY ARTERY DISEASE INVOLVING NATIVE CORONARY ARTERY OF NATIVE HEART WITHOUT ANGINA PECTORIS: ICD-10-CM

## 2024-12-02 DIAGNOSIS — I50.22 CHRONIC SYSTOLIC CONGESTIVE HEART FAILURE: ICD-10-CM

## 2024-12-02 DIAGNOSIS — Z98.890 S/P MVR (MITRAL VALVE REPAIR): Primary | ICD-10-CM

## 2024-12-02 DIAGNOSIS — I10 PRIMARY HYPERTENSION: ICD-10-CM

## 2024-12-02 DIAGNOSIS — E78.2 MIXED HYPERLIPIDEMIA: ICD-10-CM

## 2024-12-02 DIAGNOSIS — I48.19 PERSISTENT ATRIAL FIBRILLATION: ICD-10-CM

## 2024-12-02 RX ORDER — ASPIRIN 81 MG/1
81 TABLET ORAL DAILY
COMMUNITY

## 2024-12-02 NOTE — PROGRESS NOTES
"     Subjective:     Encounter Date: 12/2/2024      Patient ID: Robert Piedra is a 59 y.o. male.    Chief Complaint: Follow-up endocarditis, coronary disease, chronic systolic heart failure, atrial fibrillation, and flutter, history of severe mitral regurgitation status post mini mitral valve repair.    History of Present Illness    Mr. Piedra returns today for follow up.    He has been taken off Eliquis by Dr. Prieto at [place] because the CARLOS showed his appendage was completely sealed. Regarding his chronic systolic heart failure, it was felt likely to be nonischemic in nature. His EF had recovered. Of note, he had been previously intolerant to Entresto. . Lastly, regarding his coronary disease, he had 3 drug-eluting stents placed to the right coronary on 06/30/2022 at [facility] for non-ACS indication. By record review, it appears he was last seen in [facility] by Dr. Prieto on 12/21/2022. The note there indicating the patient had been studied in the \"Amaze trial \" receiving PVI and CTI ablation in 2019, and he was maintaining sinus rhythm without antiarrhythmic therapy. Regarding stroke protection at that visit, it was noted that he was okay to stop anticoagulation, particularly because of prior stroke at increased risk for intracranial bleeding. He did note that the left atrial appendage had been surgically ligated, but it had documented a 3 mm central leak. Apparently, they had discussed plugging this up with an Amplatzer duct occluder, but then on reevaluation, found it to be completely sealed, so felt as though left atrial appendage closure was complete.    He followed up June 2023 and he was back at work and not having any symptoms.  He denied palpitations,, chest pain, or shortness of breath with the exception of occasional mild orthopnea.  He does take Lasix as needed when he feels a fullness in his chest.  At that visit the patient was kept on Toprol-XL 25 mg daily and losartan 12.5 mg daily as well " "as as needed Lasix.  Repeat surveillance echocardiogram was completed October 2023 with satisfactory findings, 2 years from previous echo.  See results below.    He previously declined AICD implantation as this would end his career, prior to his LVEF normalizing.  He does have a loop recorder in place, but battery is now dead.    I saw him in May 2024 and he was feeling very well.  He was not having chest discomfort or shortness of breath.  He would take Lasix occasionally when he would feel a fullness in his chest, with resolution of the sensation.    Today the patient reports he continues to feel great.  He is occasionally short of breath when bending over and occasionally has \"gasping\" episodes with activity.  He states these episodes are mild and unchanged since his mitral valve surgery.  He denies any chest pain, rapid weight gain, edema, orthopnea, PND, palpitations, syncope or presyncope.  He states he quit taking his aspirin due to hematuria associated with some urologic issues that have since resolved.  He continues to work.  He remains active.    He states he might take Lasix once a month for a fullness in his chest.      The following portions of the patient's history were reviewed and updated as appropriate: allergies, current medications, past family history, past medical history, past social history, past surgical history, and problem list.    Review of Systems   Constitutional: Negative for malaise/fatigue.   Cardiovascular:  Positive for dyspnea on exertion (mild, sporadic, chronic). Negative for chest pain, claudication, leg swelling, near-syncope, orthopnea, palpitations, paroxysmal nocturnal dyspnea and syncope.   Respiratory:  Negative for cough.    Hematologic/Lymphatic: Does not bruise/bleed easily.   Musculoskeletal:  Negative for falls.   Gastrointestinal:  Negative for bloating.   Neurological:  Negative for dizziness, light-headedness and weakness.           Current Outpatient Medications: "     atorvastatin (LIPITOR) 80 MG tablet, Take 1 tablet by mouth every night at bedtime., Disp: 90 tablet, Rfl: 1    ciclopirox (PENLAC) 8 % solution, Apply  topically to the appropriate area as directed Every Night., Disp: 6 mL, Rfl: 0    Coenzyme Q10 (CO Q 10) 100 MG capsule, Take 300 mg by mouth., Disp: , Rfl:     furosemide (LASIX) 20 MG tablet, Take 1 tablet by mouth As Needed. Pt unsure of dose, Disp: , Rfl:     losartan (COZAAR) 25 MG tablet, Take 0.5 tablets by mouth Daily., Disp: 15 tablet, Rfl: 11    metoprolol succinate XL (TOPROL-XL) 25 MG 24 hr tablet, Take 1 tablet by mouth Daily., Disp: 90 tablet, Rfl: 3    tamsulosin (FLOMAX) 0.4 MG capsule 24 hr capsule, Take 1 capsule by mouth Daily., Disp: 90 capsule, Rfl: 3    triamcinolone (KENALOG) 0.1 % cream, Apply 1 Application topically to the appropriate area as directed 2 (Two) Times a Day., Disp: 80 g, Rfl: 0    aspirin 81 MG EC tablet, Take 1 tablet by mouth Daily., Disp: , Rfl:     Evolocumab (REPATHA) solution prefilled syringe injection, Inject 1 mL under the skin into the appropriate area as directed Every 14 (Fourteen) Days., Disp: 2 mL, Rfl: 11       Objective:      Vitals:    12/02/24 0921   BP: 118/78   Pulse: 72   SpO2: 97%   Weight - 249 lbs    Vitals and nursing note reviewed.   Constitutional:       General: Not in acute distress.     Appearance: Not in distress.   Neck:      Vascular: No JVD or JVR. JVD normal.   Pulmonary:      Effort: Pulmonary effort is normal.      Breath sounds: Normal breath sounds.   Cardiovascular:      Normal rate. Regular rhythm.      Murmurs: There is a grade 2/6 systolic murmur.   Edema:     Peripheral edema absent.   Skin:     General: Skin is warm and dry.   Neurological:      Mental Status: Alert, oriented to person, place, and time and oriented to person, place and time.         Lab Review:   Lab Results   Component Value Date    GLUCOSE 94 10/08/2024    BUN 15 10/08/2024    CREATININE 1.00 10/08/2024     EGFRRESULT 86.2 10/08/2024    EGFR 91.1 02/22/2024    BCR 15.0 10/08/2024    K 4.6 10/08/2024    CO2 22.6 10/08/2024    CALCIUM 9.7 10/08/2024    PROTENTOTREF 7.2 10/08/2024    ALBUMIN 4.3 10/08/2024    BILITOT 0.7 10/08/2024    AST 23 10/08/2024    ALT 16 10/08/2024        Lab Results   Component Value Date    CHOL 162 02/22/2024    CHLPL 179 10/08/2024    TRIG 76 10/08/2024    HDL 62 (H) 10/08/2024     (H) 10/08/2024        Lab Results   Component Value Date    HGBA1C 5.60 10/08/2024            ECG 12 Lead    Date/Time: 12/2/2024 9:56 AM  Performed by: Malgorzata Soto APRN    Authorized by: Malgorzata Soto APRN  Comparison: compared with previous ECG from 5/6/2024  Similar to previous ECG  Rhythm: sinus rhythm  BPM: 72  Conduction: non-specific intraventricular conduction delay    Clinical impression: abnormal EKG        Results for orders placed during the hospital encounter of 10/09/23    Adult Transthoracic Echo Complete w/ Color, Spectral and Contrast if necessary per protocol    Interpretation Summary    Left ventricular systolic function is normal. Estimated left ventricular EF = 50%    The left ventricular cavity is mild to moderately dilated.    The following left ventricular wall segments are hypokinetic: apical inferior, apical septal, basal inferoseptal, mid inferoseptal, mid anteroseptal and basal inferoseptal.    Left ventricular wall thickness is consistent with mild concentric hypertrophy.    The left atrial cavity is moderately dilated.    Surgically repaired mitral valve appears to have limited mobility of the posterior leaflet, resulting in elevated transvalvular gradient of approximately 6-7 mmHg, as well as eccentric mitral vegetation that appears mild to moderately severe.    Normal size and function the right ventricle.    No other significant (greater than mild) valvular pathology.    Compared to most recent exam which was from 10/12/2021 and performed at [place], these findings do  not appear significant different than what was reported on that study.    CARLOS from 1/31/23 at [facility] reviewed today:      [facility]  Outside Information     CARLOS    Anatomical Region Laterality Modality   -- -- Other     Narrative    Son Antonio MD     1/31/2022  2:08 PM    CARLOS      Height: 195.6 cm  Weight: 100.7 kg  BSA: 2.34 sq meters      Study Timing:  Pre Intervention    Modalities:  2D, Color flow mapping, Continuous Wave Doppler, Pulse Wave  Doppler and 3D  Structural Heart Procedure/Intervention:  No  Pre-Anesthesia Checklist:  Patient identified, IV assessed, Risks and  benefits discussed, Monitors and equipment assessed, Procedure being  performed at surgeon's request and Anesthesia consent obtained.    CARLOS placed, no complication    Procedure Indication:  Diagnostic full study (acquired lesion)  Procedure Performed:  Other    Procedure Performed Comments:  Closure residual YELENA opening after surgical  closure  Location Performed:  OR  Intubated  Bite Block placed  Heart visualized  Probe Insertion:  Easy  Probe Type:  Multiplane  Procedure Approach:  Other  Procedure Re-operation:  No    CARLOS Changed Procedure:  No    Study Rhythm:  Sinus Rhythm    Ventricular Function    Right Ventricle:  Cavity Size:  Normal  There is no thrombus present  Global Function:  Normal    Left Ventricle:  Cavity size:  Normal  There is no thrombus present  Ejection Fraction:  > 55%      Ventricular Wall Motion:    Four-Chamber View Two-Chamber View Long-Axis View Curtis  3- Basal Inferoseptal:  Normal   4- Basal Inferior:  Normal   5- Basal Inferolateral:  Normal   17- Curtis:  Normal    9- Mid Inferoseptal:  Normal   10- Mid Inferior:  Normal   11- Mid Inferolateral:  Normal      14- Apical Septal:  Normal   15- Apical Inferior:  Normal   8- Mid Anteroseptal:  Normal      16- Apical Lateral:  Normal   13- Apical Anterior:  Normal   2- Basal Anteroseptal:  Normal    12- Mid Anterolateral:  Normal   7-  Mid Anterior:  Normal      6- Basal Anterolateral:  Normal 1- Basal Anterior:  Normal      Ventricle Support:  No support      Valves    Aortic Valve:  Annulus:  Normal  Stenosis:  None    Regurgitation:  None  Leaflet Morphology:  Trileaflet  Leaflet Motion:  Normal      Mitral Valve:  Annulus:  Normal  Stenosis:  None  MR VC:  0.25 cm  Mean PG:  3 mmHg  Regurgitation:  Mild  Leaflet Morphology:  Prosthesis  Anterior Leaflet Motion:  Normal  Posterior Leaflet Motion:  Normal  No ANAT  Prosthesis:  Prosthetic Ring  Mitral Valve Comments:  PISA EROA 0.15cm2 with a RV of 24mL      Tricuspid Valve:  Annulus:  Normal  Stenosis:  None  Regurgitation:  Trace  Leaflet Morphology:  Normal      Pulmonic Valve:  Annulus:  Normal  Stenosis:  None  Regurgitation:  None  Leaflets:  Normal      Aorta    Ascending Aorta:  Size:  Normal  Dissection:  No  Plaque thickness:  0-3 mm      Aortic Arch:  Size:  Dilated  Dissection: No  Plaque Thickness:  0-3 mm      Descending Aorta:  Size:  Normal  Dissection:  No  Plaque Thickness:  0-3 mm      Epiaortic:  Epiaortic not performed    Aorta IABP:  No    Atria    Right Atrium:  Size:  Normal  SEC (smoke):  No  There is no thrombus present      Left Atrium:  Size:  Normal  SEC (smoke):  No  There is no thrombus present      Left Atrial Appendage:  Size:  Ligated  SEC (smoke):  No  There is no thrombus present      Pulmonary Vein:  Pulmonary Venous S-wave Flow:  Blunted      Septa    Atrial Septum:  Atrial Septal Morphology:  Normal      Ventricular Septum:  Ventricular Septum Morphology:  Normal      Diastolic Function:  Diastolic Dysfunction Grade: Unable to Assess  E-wave Velocity: cm/s  A-wave Velocity: cm/s  E/A Ratio:  E-wave DT: ms  Lateral e' Velocity: cm/s  Septal e' Velocity: cm/s  E/e' Ratio:    Other Findings:  Pleural Effusion:  None  Pericardium:  Normal  Pulmonary Arteries:  Normal  SVC:  Normal  IVC:  Normal    Anesthesia Information:  Anesthesiologist:  Son Antonio  MD  Surgeon:  Sherif Prieto MD      Echocardiogram Summary Comments    Patient is s/p MV repair with ligation of YELENA presenting now for closure  of residual leak of YELENA    1. Normal biventricular function with LVEF >55% and no RWMA  2. Mitral valve repair with annuloplasty and mild MR (EROA 0.15cm2 by  PISA). There is a fix linear mass that appears to be a suture originating  from the leaflet edge into LA  3. No other significant valvular disease  4. YELENA ligated and no leak seen by CFD  5. No intracardiac shunt or pericardial effusion    Procedure  1. LA appendagram performed and no residual leak demonstrated. Procedure  aborted.  2. No pericardial effusion after removal of the transseptal catheters    Assessment/Plan:     Problem List Items Addressed This Visit (all established and now stable)         Cardiac and Vasculature    Hypertension    Relevant Medications    metoprolol succinate XL (TOPROL-XL) 25 MG 24 hr tablet    Chronic systolic congestive heart failure    Overview     Last Assessment & Plan:   Formatting of this note might be different from the original.  - OSH TTE normal RV function, LVEF 30-35%. Uploaded to [facility]  - 8/21 TTE completed, see full report. Normal BiV function noted.   - Continue home lasix, BB, losartan  - Daily weights, strict I/Os         Relevant Medications    metoprolol succinate XL (TOPROL-XL) 25 MG 24 hr tablet    Coronary artery disease    Overview     6/3/2021 PCI with KELLY X 3 (  Xience Diana 2.5 x 33, Xience Diana 2.75 x 18,Xience Diana 3.5 x 33)  to RCA following abnormal nuclear stress test, which had been ordered after episode of exertional chest pain x 1 per pt report.         Relevant Medications    metoprolol succinate XL (TOPROL-XL) 25 MG 24 hr tablet    Persistent atrial fibrillation    Overview     Last Assessment & Plan:   Formatting of this note might be different from the original.  Has had a slower than he anticipated recovery after his combined  "ablation procedure for PVC's and atypical atrial flutter although has had some other medical issues related to his prostate and prostatitis/UTI which have likely been a factor.  Otherwise does not appear that he has had true sustained atrial arrhythmias although has had a fairly high burden of ectopic atrial beats, may be PACs but appear to have a different focus than his usual sinus beats that hopefully will continue to calm down as his additional ablation heals.  Will defer further changes today.  We may consider completion of his left atrial appendage closure for a central leak from his Lariat in the future but this is deferred for now and will need to be cautious about interruption of anticoagulation for other reasons as this may be higher risk for appendage thrombus. RTC 3 months with Dr. Prieto as previously arranged.  Formatting of this note might be different from the original.  Added automatically from request for surgery [id number]  BMI: 27.7 (wgt: 234 lbs hgt: 6'5\")- recent weight loss with decrease in alcohol  Alcohol: 10 glasses wine per week, and whiskey and beer  Pre DM- HGB A1C: 5.6 10/22/2019  Former smoker  Chads Vasc: 1 (HF)  Nml BP  No MANAV  Hyperlipidemia: atorvastatin  Anticoagulation: none pre ablation  Antiarrhythmic: dronedarone    Last Assessment & Plan:   Formatting of this note might be different from the original.  : he is persistently back in AF for the last couple weeks and has had symptoms associated with this.  CARLOS today confirmed that he has a torn chord with severe MR- unlikely that he will be able to maintain normal sinus rhythm until this is addressed as discussed elsewhere.  For now, I will have him increase his metoprolol for rate control- can increase to 75mg to start with and increase further to 100mg if resting HR consistently remain > 100.  It would be reasonable to load him with amiodarone at the time of valve surgery- he tolerated dronedarone in the past.  Would recommend that " he have MAZE and YELENA ligation with Atria clip at the time of valve repair or replacement- he had favorable anatomy for appendage ligation in the AMAZE trial, but was randomized to receive ablation alone.  Otherwise, are available to assist with his arrhythmia management around the time of any therapy for his valve.  Tentatively scheduled to see him back in October to coordinate with his next AMAZE study visit, but can readjust his follow-up based on further valve interventions.         Relevant Medications    metoprolol succinate XL (TOPROL-XL) 25 MG 24 hr tablet    S/P MVR (mitral valve repair) - Primary    Overview     Last Assessment & Plan:   Formatting of this note might be different from the original.  -s/p Mini MVr with LLAA and MAZE on 8/19  -Thoracotomy protection: incision care, avoid R arm lifting or R side stress  - diuresis with IV Lasix 40mg BID  -Avoid amiodarone or AV maryan blocking medications due to heart block  -Weaned dobutamine off  - + BM  -Pain management with Tylenol, lidocaine patches, PRN oxycodone.         Relevant Orders    Adult Transthoracic Echo Complete w/ Color, Spectral and Contrast if necessary per protocol         Recommendations/plans:    The patient is doing well from a cardiology perspective.  He is euvolemic on exam.  He is not having angina.  He is continuing to work without limitation.  He will continue his current medical therapy including high intensity statin, losartan and Toprol-XL, as well as Lasix on an as-needed basis only.  He will resume aspirin 81 mg daily indefinitely without interruption.  We discussed the importance of this as a relates to his coronary disease with prior stent placement and prior mitral valve repair.  He is due for repeat echocardiogram October 2025-order placed.  Additionally, his LDL is uncontrolled at 103.  Results prior to that were 93, 84, 88.  We discussed the goal LDL less than 70, ideally less than 55.  He reports compliance with his  atorvastatin 80 mg nightly.  Therefore I will add Repatha to his medical therapy for hyperlipidemia and CV event risk reduction.      Follow-up with Dr. Owusu in 6 months, call sooner with symptoms or concerns    I spent 34 minutes caring for Robert on this date of service. This time includes time spent by me in the following activities: preparing for the visit, reviewing tests, obtaining and/or reviewing a separately obtained history, performing a medically appropriate examination and/or evaluation, counseling and educating the patient/family/caregiver, and documenting information in the medical record

## 2024-12-09 ENCOUNTER — TELEPHONE (OUTPATIENT)
Dept: FAMILY MEDICINE CLINIC | Facility: CLINIC | Age: 59
End: 2024-12-09

## 2024-12-09 NOTE — TELEPHONE ENCOUNTER
Caller: Robert Piedra    Relationship: Self    Best call back number:     [phone]        What medication are you requesting: SEE SYMPTOMS BELOW     What are your current symptoms: CONGESTION, COUGH, RUNNY NOSE    How long have you been experiencing symptoms: A WEEK    Have you had these symptoms before:    [] Yes  [x] No    Have you been treated for these symptoms before:   [] Yes  [x] No    If a prescription is needed, what is your preferred pharmacy and phone number:    [facility]     Additional notes: PATIENT UNDERSTAND THAT HE MAY NEED TO BE SEEN IN THE OFFICE BEFORE THIS CAN BE PRESCRIBED. PLEASE CALL BACK WITH ADVISEMENT.
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
## 2024-12-30 ENCOUNTER — OFFICE VISIT (OUTPATIENT)
Dept: UROLOGY | Facility: CLINIC | Age: 59
End: 2024-12-30
Payer: COMMERCIAL

## 2024-12-30 VITALS — TEMPERATURE: 98 F | WEIGHT: 249.8 LBS | BODY MASS INDEX: 29.5 KG/M2 | HEIGHT: 77 IN

## 2024-12-30 DIAGNOSIS — N40.1 BPH WITH URINARY OBSTRUCTION: ICD-10-CM

## 2024-12-30 DIAGNOSIS — R31.0 GROSS HEMATURIA: Primary | ICD-10-CM

## 2024-12-30 DIAGNOSIS — N13.8 BPH WITH URINARY OBSTRUCTION: ICD-10-CM

## 2024-12-30 DIAGNOSIS — N52.01 ERECTILE DYSFUNCTION DUE TO ARTERIAL INSUFFICIENCY: ICD-10-CM

## 2024-12-30 LAB
BILIRUB BLD-MCNC: NEGATIVE MG/DL
CLARITY, POC: CLEAR
COLOR UR: YELLOW
GLUCOSE UR STRIP-MCNC: NEGATIVE MG/DL
KETONES UR QL: NEGATIVE
LEUKOCYTE EST, POC: ABNORMAL
NITRITE UR-MCNC: NEGATIVE MG/ML
PH UR: 5 [PH] (ref 5–8)
PROT UR STRIP-MCNC: NEGATIVE MG/DL
RBC # UR STRIP: ABNORMAL /UL
SP GR UR: 1.02 (ref 1–1.03)
UROBILINOGEN UR QL: NORMAL

## 2024-12-30 PROCEDURE — 99214 OFFICE O/P EST MOD 30 MIN: CPT | Performed by: PHYSICIAN ASSISTANT

## 2024-12-30 PROCEDURE — 87086 URINE CULTURE/COLONY COUNT: CPT | Performed by: PHYSICIAN ASSISTANT

## 2024-12-30 PROCEDURE — 81001 URINALYSIS AUTO W/SCOPE: CPT | Performed by: PHYSICIAN ASSISTANT

## 2024-12-30 RX ORDER — TADALAFIL 20 MG/1
20 TABLET ORAL AS NEEDED
Qty: 5 TABLET | Refills: 0 | Status: SHIPPED | OUTPATIENT
Start: 2024-12-30

## 2025-01-01 LAB — BACTERIA SPEC AEROBE CULT: NO GROWTH

## 2025-01-02 ENCOUNTER — TELEPHONE (OUTPATIENT)
Dept: UROLOGY | Facility: CLINIC | Age: 60
End: 2025-01-02
Payer: COMMERCIAL

## 2025-01-02 NOTE — TELEPHONE ENCOUNTER
----- Message from Marcus Rivers sent at 1/2/2025  9:28 AM CST -----  Regarding: Urine culture  No growth of bacteria no indication for antibiotic  ----- Message -----  From: Marci Perrin CMA  Sent: 12/30/2024   9:04 AM CST  To: BAM Marquis

## 2025-02-03 ENCOUNTER — TELEPHONE (OUTPATIENT)
Dept: FAMILY MEDICINE CLINIC | Facility: CLINIC | Age: 60
End: 2025-02-03
Payer: COMMERCIAL

## 2025-02-24 RX ORDER — ATORVASTATIN CALCIUM 80 MG/1
80 TABLET, FILM COATED ORAL
Qty: 30 TABLET | Refills: 5 | Status: SHIPPED | OUTPATIENT
Start: 2025-02-24
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
## 2025-03-06 LAB — PSA SERPL-MCNC: 11.7 NG/ML (ref 0–4)

## 2025-03-12 ENCOUNTER — OFFICE VISIT (OUTPATIENT)
Dept: UROLOGY | Facility: CLINIC | Age: 60
End: 2025-03-12
Payer: COMMERCIAL

## 2025-03-12 VITALS — BODY MASS INDEX: 29.47 KG/M2 | HEIGHT: 77 IN | TEMPERATURE: 97.8 F | WEIGHT: 249.6 LBS

## 2025-03-12 DIAGNOSIS — N52.01 ERECTILE DYSFUNCTION DUE TO ARTERIAL INSUFFICIENCY: ICD-10-CM

## 2025-03-12 DIAGNOSIS — R97.20 ELEVATED PSA: Primary | ICD-10-CM

## 2025-03-12 DIAGNOSIS — N13.8 BPH WITH URINARY OBSTRUCTION: ICD-10-CM

## 2025-03-12 DIAGNOSIS — N52.9 IMPOTENCE OF ORGANIC ORIGIN: ICD-10-CM

## 2025-03-12 DIAGNOSIS — N40.1 BPH WITH URINARY OBSTRUCTION: ICD-10-CM

## 2025-03-12 LAB
BILIRUB BLD-MCNC: NEGATIVE MG/DL
CLARITY, POC: CLEAR
COLOR UR: YELLOW
GLUCOSE UR STRIP-MCNC: NEGATIVE MG/DL
KETONES UR QL: NEGATIVE
LEUKOCYTE EST, POC: NEGATIVE
NITRITE UR-MCNC: NEGATIVE MG/ML
PH UR: 5.5 [PH] (ref 5–8)
PROT UR STRIP-MCNC: NEGATIVE MG/DL
RBC # UR STRIP: NEGATIVE /UL
SP GR UR: 1.03 (ref 1–1.03)
UROBILINOGEN UR QL: NORMAL

## 2025-03-12 RX ORDER — TADALAFIL 20 MG/1
20 TABLET ORAL AS NEEDED
Qty: 10 TABLET | Refills: 6 | Status: SHIPPED | OUTPATIENT
Start: 2025-03-12

## 2025-03-12 RX ORDER — TAMSULOSIN HYDROCHLORIDE 0.4 MG/1
1 CAPSULE ORAL DAILY
Qty: 90 CAPSULE | Refills: 3 | Status: SHIPPED | OUTPATIENT
Start: 2025-03-12

## 2025-03-18 ENCOUNTER — OFFICE VISIT (OUTPATIENT)
Dept: NEUROLOGY | Facility: CLINIC | Age: 60
End: 2025-03-18
Payer: COMMERCIAL

## 2025-03-18 VITALS
SYSTOLIC BLOOD PRESSURE: 130 MMHG | HEART RATE: 78 BPM | DIASTOLIC BLOOD PRESSURE: 82 MMHG | HEIGHT: 77 IN | WEIGHT: 250 LBS | BODY MASS INDEX: 29.52 KG/M2 | OXYGEN SATURATION: 96 %

## 2025-03-18 DIAGNOSIS — I48.0 PAROXYSMAL ATRIAL FIBRILLATION: ICD-10-CM

## 2025-03-18 DIAGNOSIS — E78.5 HYPERLIPIDEMIA LDL GOAL <70: ICD-10-CM

## 2025-03-18 DIAGNOSIS — I10 ESSENTIAL HYPERTENSION: ICD-10-CM

## 2025-03-18 DIAGNOSIS — Z86.73 HISTORY OF STROKE WITHOUT RESIDUAL DEFICITS: Primary | ICD-10-CM

## 2025-04-28 ENCOUNTER — OFFICE VISIT (OUTPATIENT)
Dept: FAMILY MEDICINE CLINIC | Facility: CLINIC | Age: 60
End: 2025-04-28
Payer: COMMERCIAL

## 2025-04-28 VITALS
WEIGHT: 250.4 LBS | TEMPERATURE: 97.4 F | SYSTOLIC BLOOD PRESSURE: 138 MMHG | BODY MASS INDEX: 29.57 KG/M2 | OXYGEN SATURATION: 97 % | HEIGHT: 77 IN | DIASTOLIC BLOOD PRESSURE: 80 MMHG | HEART RATE: 81 BPM

## 2025-04-28 DIAGNOSIS — Z12.5 SCREENING FOR PROSTATE CANCER: ICD-10-CM

## 2025-04-28 DIAGNOSIS — I10 PRIMARY HYPERTENSION: ICD-10-CM

## 2025-04-28 DIAGNOSIS — E78.2 MIXED HYPERLIPIDEMIA: Primary | ICD-10-CM

## 2025-04-28 DIAGNOSIS — R73.03 PREDIABETES: ICD-10-CM

## 2025-04-28 RX ORDER — TRIAMCINOLONE ACETONIDE 1 MG/G
1 OINTMENT TOPICAL 2 TIMES DAILY
Qty: 80 G | Refills: 0 | Status: SHIPPED | OUTPATIENT
Start: 2025-04-28
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
## 2025-04-29 LAB
ALBUMIN SERPL-MCNC: 4.5 G/DL (ref 3.5–5.2)
ALBUMIN/GLOB SERPL: 2 G/DL
ALP SERPL-CCNC: 82 U/L (ref 39–117)
ALT SERPL-CCNC: 17 U/L (ref 1–41)
AST SERPL-CCNC: 22 U/L (ref 1–40)
BASOPHILS # BLD AUTO: 0 10*3/MM3 (ref 0–0.2)
BASOPHILS NFR BLD AUTO: 0 % (ref 0–1.5)
BILIRUB SERPL-MCNC: 0.8 MG/DL (ref 0–1.2)
BUN SERPL-MCNC: 12 MG/DL (ref 8–23)
BUN/CREAT SERPL: 11.7 (ref 7–25)
CALCIUM SERPL-MCNC: 9.5 MG/DL (ref 8.6–10.5)
CHLORIDE SERPL-SCNC: 104 MMOL/L (ref 98–107)
CHOLEST SERPL-MCNC: 108 MG/DL (ref 0–200)
CO2 SERPL-SCNC: 26.5 MMOL/L (ref 22–29)
CREAT SERPL-MCNC: 1.03 MG/DL (ref 0.76–1.27)
EGFRCR SERPLBLD CKD-EPI 2021: 82.6 ML/MIN/1.73
EOSINOPHIL # BLD AUTO: 0.08 10*3/MM3 (ref 0–0.4)
EOSINOPHIL NFR BLD AUTO: 1.4 % (ref 0.3–6.2)
ERYTHROCYTE [DISTWIDTH] IN BLOOD BY AUTOMATED COUNT: 12.4 % (ref 12.3–15.4)
GLOBULIN SER CALC-MCNC: 2.3 GM/DL
GLUCOSE SERPL-MCNC: 96 MG/DL (ref 65–99)
HBA1C MFR BLD: 5.8 % (ref 4.8–5.6)
HCT VFR BLD AUTO: 45.5 % (ref 37.5–51)
HDLC SERPL-MCNC: 62 MG/DL (ref 40–60)
HGB BLD-MCNC: 15.1 G/DL (ref 13–17.7)
IMM GRANULOCYTES # BLD AUTO: 0.01 10*3/MM3 (ref 0–0.05)
IMM GRANULOCYTES NFR BLD AUTO: 0.2 % (ref 0–0.5)
LDLC SERPL CALC-MCNC: 33 MG/DL (ref 0–100)
LYMPHOCYTES # BLD AUTO: 1.3 10*3/MM3 (ref 0.7–3.1)
LYMPHOCYTES NFR BLD AUTO: 23.3 % (ref 19.6–45.3)
MCH RBC QN AUTO: 31.2 PG (ref 26.6–33)
MCHC RBC AUTO-ENTMCNC: 33.2 G/DL (ref 31.5–35.7)
MCV RBC AUTO: 94 FL (ref 79–97)
MONOCYTES # BLD AUTO: 0.61 10*3/MM3 (ref 0.1–0.9)
MONOCYTES NFR BLD AUTO: 10.9 % (ref 5–12)
NEUTROPHILS # BLD AUTO: 3.59 10*3/MM3 (ref 1.7–7)
NEUTROPHILS NFR BLD AUTO: 64.2 % (ref 42.7–76)
NRBC BLD AUTO-RTO: 0 /100 WBC (ref 0–0.2)
PLATELET # BLD AUTO: 194 10*3/MM3 (ref 140–450)
POTASSIUM SERPL-SCNC: 4.6 MMOL/L (ref 3.5–5.2)
PROT SERPL-MCNC: 6.8 G/DL (ref 6–8.5)
PSA SERPL-MCNC: 11.4 NG/ML (ref 0–4)
RBC # BLD AUTO: 4.84 10*6/MM3 (ref 4.14–5.8)
SODIUM SERPL-SCNC: 140 MMOL/L (ref 136–145)
TRIGL SERPL-MCNC: 55 MG/DL (ref 0–150)
TSH SERPL DL<=0.005 MIU/L-ACNC: 0.91 UIU/ML (ref 0.27–4.2)
VLDLC SERPL CALC-MCNC: 13 MG/DL (ref 5–40)
WBC # BLD AUTO: 5.59 10*3/MM3 (ref 3.4–10.8)

## 2025-06-02 ENCOUNTER — OFFICE VISIT (OUTPATIENT)
Dept: CARDIOLOGY | Facility: CLINIC | Age: 60
End: 2025-06-02
Payer: COMMERCIAL

## 2025-06-02 VITALS
HEART RATE: 80 BPM | SYSTOLIC BLOOD PRESSURE: 134 MMHG | HEIGHT: 77 IN | DIASTOLIC BLOOD PRESSURE: 77 MMHG | WEIGHT: 250 LBS | BODY MASS INDEX: 29.52 KG/M2

## 2025-06-02 DIAGNOSIS — I50.22 CHRONIC SYSTOLIC CONGESTIVE HEART FAILURE: ICD-10-CM

## 2025-06-02 DIAGNOSIS — I63.40 CEREBROVASCULAR ACCIDENT (CVA) DUE TO EMBOLISM OF CEREBRAL ARTERY: ICD-10-CM

## 2025-06-02 DIAGNOSIS — I34.0 NONRHEUMATIC MITRAL VALVE REGURGITATION: ICD-10-CM

## 2025-06-02 DIAGNOSIS — Z98.890 S/P MVR (MITRAL VALVE REPAIR): ICD-10-CM

## 2025-06-02 DIAGNOSIS — I25.5 ISCHEMIC CARDIOMYOPATHY: ICD-10-CM

## 2025-06-02 DIAGNOSIS — I50.9 CONGESTIVE HEART FAILURE, UNSPECIFIED HF CHRONICITY, UNSPECIFIED HEART FAILURE TYPE: Primary | ICD-10-CM

## 2025-06-02 PROCEDURE — 99214 OFFICE O/P EST MOD 30 MIN: CPT | Performed by: INTERNAL MEDICINE

## 2025-06-02 PROCEDURE — 93000 ELECTROCARDIOGRAM COMPLETE: CPT | Performed by: INTERNAL MEDICINE
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
## 2025-06-19 RX ORDER — TRIAMCINOLONE ACETONIDE 1 MG/G
1 OINTMENT TOPICAL 2 TIMES DAILY
Qty: 80 G | Refills: 0 | Status: SHIPPED | OUTPATIENT
Start: 2025-06-19

## 2025-06-23 RX ORDER — FUROSEMIDE 20 MG/1
20 TABLET ORAL AS NEEDED
Qty: 30 TABLET | Refills: 1 | Status: SHIPPED | OUTPATIENT
Start: 2025-06-23

## 2025-06-23 NOTE — TELEPHONE ENCOUNTER
Previous Prescriber is not Historical     Rx Refill Note  Requested Prescriptions     Pending Prescriptions Disp Refills    furosemide (LASIX) 20 MG tablet       Sig: Take 1 tablet by mouth As Needed. Pt unsure of dose      Last office visit with office: 04/28/2025  Next office visit with office: 10/27/2025    UDS:     DATE OF LAST REFILL: 05/06/2024    Controlled Substance Agreement:     JAMA OR TERRIP:          {TIP  Is Refill Pharmacy correct?:  Nancy Swain MA  06/23/25, 13:12 CDT

## 2025-06-24 RX ORDER — LOSARTAN POTASSIUM 25 MG/1
12.5 TABLET ORAL
Qty: 45 TABLET | Refills: 3 | Status: SHIPPED | OUTPATIENT
Start: 2025-06-24

## 2025-06-24 RX ORDER — METOPROLOL SUCCINATE 25 MG/1
25 TABLET, EXTENDED RELEASE ORAL DAILY
Qty: 90 TABLET | Refills: 3 | Status: SHIPPED | OUTPATIENT
Start: 2025-06-24

## 2025-07-10 RX ORDER — METOPROLOL SUCCINATE 25 MG/1
25 TABLET, EXTENDED RELEASE ORAL DAILY
Qty: 30 TABLET | Refills: 11 | Status: SHIPPED | OUTPATIENT
Start: 2025-07-10

## 2025-07-21 ENCOUNTER — HOSPITAL ENCOUNTER (OUTPATIENT)
Dept: MRI IMAGING | Facility: HOSPITAL | Age: 60
Discharge: HOME | End: 2025-07-21
Admitting: INTERNAL MEDICINE
Payer: COMMERCIAL

## 2025-07-21 PROCEDURE — 75561 CARDIAC MRI FOR MORPH W/DYE: CPT

## 2025-07-21 PROCEDURE — 25510000001 GADOPICLENOL 0.5 MMOL/ML SOLUTION: Performed by: INTERNAL MEDICINE

## 2025-07-21 PROCEDURE — A9573 GADOPICLENOL 0.5 MMOL/ML SOLUTION: HCPCS | Performed by: INTERNAL MEDICINE

## 2025-07-21 PROCEDURE — 76014 MR SFTY IMPLT&/FB ASMT STF 1: CPT

## 2025-07-21 RX ADMIN — GADOPICLENOL 1.5 ML: 485.1 INJECTION INTRAVENOUS at 08:55

## 2025-07-21 RX ADMIN — GADOPICLENOL 10 ML: 485.1 INJECTION INTRAVENOUS at 08:55

## 2025-07-30 ENCOUNTER — TELEPHONE (OUTPATIENT)
Dept: FAMILY MEDICINE CLINIC | Facility: CLINIC | Age: 60
End: 2025-07-30

## 2025-07-30 RX ORDER — ATORVASTATIN CALCIUM 80 MG/1
80 TABLET, FILM COATED ORAL
Qty: 30 TABLET | Refills: 5 | Status: SHIPPED | OUTPATIENT
Start: 2025-07-30

## 2025-07-30 RX ORDER — FUROSEMIDE 20 MG/1
20 TABLET ORAL AS NEEDED
Qty: 30 TABLET | Refills: 1 | Status: SHIPPED | OUTPATIENT
Start: 2025-07-30

## 2025-07-30 RX ORDER — TRIAMCINOLONE ACETONIDE 1 MG/G
1 OINTMENT TOPICAL 2 TIMES DAILY
Qty: 80 G | Refills: 0 | Status: SHIPPED | OUTPATIENT
Start: 2025-07-30

## 2025-07-31 ENCOUNTER — TELEPHONE (OUTPATIENT)
Dept: FAMILY MEDICINE CLINIC | Facility: CLINIC | Age: 60
End: 2025-07-31
Payer: COMMERCIAL

## 2025-07-31 NOTE — TELEPHONE ENCOUNTER
Pt states his potassium prescription he is taking is an old one from Dr. Elida joshi that worked in [place] [facility] at [facility]. He has been taking it daily at dose 20meq. It is potassium cler

## 2025-08-01 RX ORDER — POTASSIUM CHLORIDE 1500 MG/1
20 TABLET, EXTENDED RELEASE ORAL DAILY
Qty: 30 TABLET | Refills: 5 | Status: SHIPPED | OUTPATIENT
Start: 2025-08-01

## 2025-08-01 NOTE — TELEPHONE ENCOUNTER
Potassium chloride 20 mill equivalent daily has been refilled.    Electronically signed by LUDWIG Chandra, 08/01/25, 7:09 AM CDT.

## (undated) DEVICE — MASK,OXYGEN,MED CONC,ADLT,7' TUB, UC: Brand: PENDING

## (undated) DEVICE — Device: Brand: DEFENDO AIR/WATER/SUCTION AND BIOPSY VALVE

## (undated) DEVICE — THE SINGLE USE ETRAP – POLYP TRAP IS USED FOR SUCTION RETRIEVAL OF ENDOSCOPICALLY REMOVED POLYPS.: Brand: ETRAP

## (undated) DEVICE — SNAR POLYP SENSATION MICRO OVL 13 240X40

## (undated) DEVICE — GUIDE NDL BIOP BIPLANE 17G STRL 1P/U

## (undated) DEVICE — YANKAUER,BULB TIP WITH VENT: Brand: ARGYLE

## (undated) DEVICE — SPNG GZ WOVN 4X4IN 12PLY 10/BX STRL

## (undated) DEVICE — MARKR SKIN W/RULR AND LBL

## (undated) DEVICE — SENSR O2 OXIMAX FNGR A/ 18IN NONSTR

## (undated) DEVICE — PAD,PREPPING,CUFFED,24X48,7",NONSTERILE: Brand: MEDLINE

## (undated) DEVICE — GLV SURG BIOGEL M LTX PF 7 1/2

## (undated) DEVICE — PK TURNOVER RM ADV

## (undated) DEVICE — TOWEL,OR,DSP,ST,BLUE,STD,4/PK,20PK/CS: Brand: MEDLINE

## (undated) DEVICE — CUFF,BP,DISP,1 TUBE,ADULT,HP: Brand: MEDLINE

## (undated) DEVICE — THE CHANNEL CLEANING BRUSH IS A NYLON FLEXI BRUSH ATTACHED TO A FLEXIBLE PLASTIC SHEATH DESIGNED TO SAFELY REMOVE DEBRIS FROM FLEXIBLE ENDOSCOPES.

## (undated) DEVICE — HLDR PROB URONAV

## (undated) DEVICE — MAX-CORE® DISPOSABLE CORE BIOPSY INSTRUMENT, 18G X 25CM: Brand: MAX-CORE